# Patient Record
Sex: MALE | Race: WHITE | Employment: OTHER | URBAN - METROPOLITAN AREA
[De-identification: names, ages, dates, MRNs, and addresses within clinical notes are randomized per-mention and may not be internally consistent; named-entity substitution may affect disease eponyms.]

---

## 2018-10-30 ENCOUNTER — HOSPITAL ENCOUNTER (OUTPATIENT)
Dept: PHYSICAL THERAPY | Age: 78
Setting detail: THERAPIES SERIES
Discharge: HOME | End: 2018-10-30
Payer: MEDICARE

## 2018-10-30 PROCEDURE — 97161 PT EVAL LOW COMPLEX 20 MIN: CPT | Performed by: CHIROPRACTOR

## 2018-10-30 PROCEDURE — G8981 BODY POS CURRENT STATUS: HCPCS | Performed by: CHIROPRACTOR

## 2018-10-30 PROCEDURE — G8982 BODY POS GOAL STATUS: HCPCS | Performed by: CHIROPRACTOR

## 2018-10-30 PROCEDURE — 97530 THERAPEUTIC ACTIVITIES: CPT | Performed by: CHIROPRACTOR

## 2018-10-30 ASSESSMENT — PAIN SCALES - QUEBEC BACK PAIN DISABILITY SCALE
TOTAL SCORE: 83
QUEBEC CMS MODIFIER: CM
BEND OVER TO CLEAN THE BATHTUB: 5
SIT IN A CHAIR FOR SEVERAL HOURS: 4
MAKE YOUR BED: 4
PUT ON SOCKS OR PANYHOSE: 5
RIDE IN A CAR: 4
TURN OVER IN BED: 3
REACH UP TO HIGH SHELVES: 4
CARRY TWO BAGS OF GROCERIES: 5
MOVE A CHAIR: 3
WALK A FEW BLOCKS OR 300 TO 400M: 5
TAKE FOOD OUT OF THE REFRIGERATOR: 2
STAND UP FOR 20 TO 30 MINUTES: 4
LIFT AND CARRY A HEAVY SUITCASE: 5
RUN ONE BLOCK OR 100M: 5
WALK SEVERAL KILOMETERS  OR MILES: 5
THROW A BALL: 4
QUEBEC DISABILITY INDEX: 80-99%
CLIMB ONE FLIGHT OF STAIRS: 5
SLEEP THROUGH THE NIGHT: 4
GET OUT OF BED: 4
PULL OR PUSH HEAVY DOORS: 3

## 2018-11-01 ENCOUNTER — HOSPITAL ENCOUNTER (OUTPATIENT)
Dept: PHYSICAL THERAPY | Age: 78
Setting detail: THERAPIES SERIES
Discharge: HOME | End: 2018-11-01
Payer: MEDICARE

## 2018-11-01 PROCEDURE — 97113 AQUATIC THERAPY/EXERCISES: CPT

## 2018-11-06 ENCOUNTER — HOSPITAL ENCOUNTER (OUTPATIENT)
Dept: PHYSICAL THERAPY | Age: 78
Setting detail: THERAPIES SERIES
Discharge: HOME | End: 2018-11-06
Payer: MEDICARE

## 2018-11-06 PROCEDURE — 97150 GROUP THERAPEUTIC PROCEDURES: CPT

## 2018-11-06 PROCEDURE — 97113 AQUATIC THERAPY/EXERCISES: CPT

## 2018-11-08 ENCOUNTER — HOSPITAL ENCOUNTER (OUTPATIENT)
Dept: PHYSICAL THERAPY | Age: 78
Setting detail: THERAPIES SERIES
Discharge: HOME | End: 2018-11-08
Payer: MEDICARE

## 2018-11-08 PROCEDURE — 97150 GROUP THERAPEUTIC PROCEDURES: CPT

## 2018-11-08 PROCEDURE — 97113 AQUATIC THERAPY/EXERCISES: CPT

## 2018-11-08 NOTE — FLOWSHEET NOTE
Physical Therapy Aquatic Flow Sheet   Date:  2018    Patient Name:  Kulwant Watkins    :  1940     Restrictions/Precautions:    Pertinent Medical History:  Medical/Treatment Diagnosis Information:  · Diagnosis: LBP / Spinal Stenosis  ·   Insurance/Certification information:   Medicare  Physician Information:   Dr. Kenyatta Nielsen of care signed (Y/N): Faxed    Visit# / total visits:    Pain level:     3/10 LBP/ B LE    Progress Note: []  Yes  [x]  No  Next due by: Visit #10:      Subjective:  Soreness B shlds,L>R, and R lower leg    Objective:  Observation:  See eval  Test measurements:      Key  B= Belt DB= Dumbells T= Theratube   G=Gloves N= Noodles W= Weights   P= Paddles WW=Water Walker K= Kickboard        Transfers:   steps ind / B rails      % Immersion: 75%            Ambulation:  laps ind Exercises UE:  B   gentle    Forwards 5 Shoulder Shrugs     Lateral 1 HHA Shoulder circles     Marching    Scapular Retraction      Retro   Rolling  10    Cariocas  Push Downs 10     IR/ER 10     Punching    Stretching: B    30 sec  Rowing 10   Gastroc/Soleus  2 Elbow Flex/Ext 10   Hamstring  2 Shldr Flex/Ext     Knee flex stretch   Shldr Abd/Add    Piriformis   Horiz Abd/Add     Hip flexor    Arm Circles     SKTC   PNF Diagonals    DKTC 1 min UE oscillations f/b, s/s    ITB   Wall Push-ups    Quad  Figure 8's    Mid back   Buoy pull/push downs    UT  Tband rows    Rhom  Tband lats    Post Shoulder  Lumbar Rot w/ paddles    Pec   Small ball pull downs                   diagonals             Cervical:       AROM Flex       AROM Extension     LEs exercises:  B AROM Side Bending    Marching  10 AROM Rotation    Heel Raises  10 Chin tucks    Toe Raises  10 Chin nods     Squats  10      Hamstring Curls  10      Hip Flexion  10 Balance:      Hip Abduction 10 SLS    Hip Circles 10 Tandem stance    TA set 10 NBOS eyes open    Glut Set 10 NBOS eyes closed    Hip Extension  Hand to Opposite Knee    Hip Adduction    Box Step     Hip IR   Noodle Stance     Hip ER  Stop/Go Gait    Fig 8's  Switch Gait                Seated: B Functional:    Ankle Pumps 15 Step up forward    Ankle circles 10 Step up lateral    Knee flex & ext 15 Step down    Hip Abd & Adduction 15 [place] squats    Bicycle  15 Crate Lifts    Add Set with ball 10 Lunges forward    LX stab with med ball throws  Lunges lateral    Ankle INV  Lunges retro    Ankle EV  Lower ab curl with noodle      Upper ab curl with ball      Med ball straight lifts      Med ball diagonal lifts      Hydrorider          Noodle:      Leg Press  Deep Water:     hang at wall 1 min    relief Jog    Noodle hang deep water  Jumping Jacks    Noodle Bicycle  Heel to toe      Hand to opposite knee      Cross country skier      Rocking Horse        Timed Code Treatment Minutes:  45    Total Treatment Minutes:  45    Treatment/Activity Tolerance:  [x] Patient tolerated treatment well [] Patient limited by fatique  [] Patient limited by pain  [] Patient limited by other medical complications  [] Other:     Prognosis: [x] Good [] Fair  [] Poor    Patient Requires Follow-up: [x] Yes  [] No    Plan:   [x] Continue per plan of care [] Alter current plan (see comments)  [] Plan of care initiated [] Hold pending MD visit [] Discharge    Plan for Next Session:  Increase gt, seated ex    Electronically signed by: Shellie Segovia

## 2018-11-13 ENCOUNTER — HOSPITAL ENCOUNTER (OUTPATIENT)
Dept: PHYSICAL THERAPY | Age: 78
Setting detail: THERAPIES SERIES
Discharge: HOME | End: 2018-11-13
Payer: MEDICARE

## 2018-11-13 PROCEDURE — 97150 GROUP THERAPEUTIC PROCEDURES: CPT

## 2018-11-13 PROCEDURE — 97113 AQUATIC THERAPY/EXERCISES: CPT

## 2018-11-15 ENCOUNTER — HOSPITAL ENCOUNTER (OUTPATIENT)
Dept: PHYSICAL THERAPY | Age: 78
Setting detail: THERAPIES SERIES
Discharge: HOME | End: 2018-11-15
Payer: MEDICARE

## 2018-11-15 PROCEDURE — 97150 GROUP THERAPEUTIC PROCEDURES: CPT

## 2018-11-15 PROCEDURE — 97113 AQUATIC THERAPY/EXERCISES: CPT

## 2018-11-20 ENCOUNTER — HOSPITAL ENCOUNTER (OUTPATIENT)
Dept: PHYSICAL THERAPY | Age: 78
Setting detail: THERAPIES SERIES
Discharge: HOME | End: 2018-11-20
Payer: MEDICARE

## 2018-11-20 PROCEDURE — 97113 AQUATIC THERAPY/EXERCISES: CPT

## 2018-11-20 PROCEDURE — 97150 GROUP THERAPEUTIC PROCEDURES: CPT

## 2018-11-20 NOTE — FLOWSHEET NOTE
30 sec    Glut Set 10 NBOS eyes closed    Hip Extension  Hand to Opposite Knee    Hip Adduction    Box Step     Hip IR   Noodle Stance     Hip ER  Stop/Go Gait    Fig 8's  Switch Gait                Seated: B Functional: B   Ankle Pumps 25 Step up forward    Ankle circles 10 Step up lateral    Knee flex & ext 25 Step down    Hip Abd & Adduction 25 [place] squats 10   Bicycle  25 Crate Lifts    Add Set with ball 10 Lunges forward    LX stab with med ball throws  Lunges lateral    Ankle INV  Lunges retro    Ankle EV  Lower ab curl with noodle      Upper ab curl with ball      Med ball straight lifts      Med ball diagonal lifts      Hydrorider          Noodle:      Leg Press  Deep Water:     hang at wall 1 min    relief Jog    Noodle hang deep water  Jumping Jacks    Noodle Bicycle  Heel to toe      Hand to opposite knee      Cross country skier      Rocking Horse        Timed Code Treatment Minutes:  55    Total Treatment Minutes:  55    Treatment/Activity Tolerance:  [x] Patient tolerated treatment well [] Patient limited by fatique  [] Patient limited by pain  [] Patient limited by other medical complications  [] Other: Needs assist walking in shallow water and lateral gt due to poor balance.      Prognosis: [x] Good [] Fair  [] Poor    Patient Requires Follow-up: [x] Yes  [] No    Plan:   [x] Continue per plan of care [] Alter current plan (see comments)  [] Plan of care initiated [] Hold pending MD visit [] Discharge    Plan for Next Session:  Increase gt, seated ex    Electronically signed by: Monse Dupree,

## 2018-11-27 ENCOUNTER — HOSPITAL ENCOUNTER (OUTPATIENT)
Dept: PHYSICAL THERAPY | Age: 78
Setting detail: THERAPIES SERIES
Discharge: HOME | End: 2018-11-27
Payer: MEDICARE

## 2018-11-27 PROCEDURE — 97113 AQUATIC THERAPY/EXERCISES: CPT

## 2018-11-27 PROCEDURE — 97150 GROUP THERAPEUTIC PROCEDURES: CPT

## 2018-11-29 ENCOUNTER — HOSPITAL ENCOUNTER (OUTPATIENT)
Dept: PHYSICAL THERAPY | Age: 78
Setting detail: THERAPIES SERIES
Discharge: HOME | End: 2018-11-29
Payer: MEDICARE

## 2018-11-29 PROCEDURE — 97113 AQUATIC THERAPY/EXERCISES: CPT

## 2018-11-29 PROCEDURE — 97150 GROUP THERAPEUTIC PROCEDURES: CPT

## 2018-11-29 NOTE — FLOWSHEET NOTE
Physical Therapy Aquatic Flow Sheet   Date:  2018    Patient Name:  Sheree Fernandes    :  1940     Restrictions/Precautions:    Pertinent Medical History:  Medical/Treatment Diagnosis Information:  · Diagnosis: LBP / Spinal Stenosis  ·   Insurance/Certification information:   Medicare  Physician Information:   Dr. Spencer Rodriguez of care signed (Y/N): Faxed    Visit# / total visits:    Pain level:     3/10 LBP/ B LE    Progress Note: []  Yes  [x]  No  Next due by: Visit #10:      Subjective:  Feels balance a little better. Objective:  Observation:  See eval  Test measurements:      Key  B= Belt DB= Dumbells T= Theratube   G=Gloves N= Noodles W= Weights   P= Paddles WW=Water Walker K= Kickboard        Transfers:   steps ind / B rails      % Immersion: 75%            Ambulation:  laps ind Exercises UE:  B   gentle    Forwards 6 Shoulder Shrugs 10    Lateral 1 HHA for balance Shoulder circles unable    Marching    Scapular Retraction  10    Retro   Rolling  10    Cariocas  Push Downs 10     IR/ER 10     Punching    Stretching: B    30 sec  Rowing 10   Gastroc/Soleus  2 Elbow Flex/Ext 10   Hamstring  2 Shldr Flex/Ext     Knee flex stretch   Shldr Abd/Add    Piriformis   Horiz Abd/Add     Hip flexor    Arm Circles     SKTC   PNF Diagonals    DKTC 1 min & prn       relief UE oscillations f/b, s/s    ITB   Wall Push-ups    Quad  Figure 8's    Mid back   Buoy pull/push downs    UT  Tband rows    Rhom  Tband lats    Post Shoulder  Lumbar Rot w/ paddles    Pec   Small ball pull downs                   diagonals             Cervical:       AROM Flex       AROM Extension     LEs exercises:  B AROM Side Bending    Marching  10 AROM Rotation    Heel Raises  10 Chin tucks    Toe Raises  10 Chin nods     Squats  10      Hamstring Curls  10      Hip Flexion  10 Balance:      Hip Abduction 10 SLS    Hip Circles 10 Tandem stance    TA set 10 NBOS eyes open 30 sec    Glut Set 10 NBOS eyes closed    Hip Extension  Hand to Opposite Knee    Hip Adduction    Box Step  3 R/L    HHA    Hip IR   Noodle Stance     Hip ER  Stop/Go Gait    Fig 8's  Switch Gait      Step stance 30 sec R/L         Seated: B Functional: B   Ankle Pumps 30 Step up forward    Ankle circles 10 Step up lateral    Knee flex & ext 30 Step down    Hip Abd & Adduction 30 [place] squats 10   Bicycle  30 Crate Lifts    Add Set with ball 10 Lunges forward    LX stab with med ball throws  Lunges lateral    Ankle INV  Lunges retro    Ankle EV  Lower ab curl with noodle      Upper ab curl with ball      Med ball straight lifts      Med ball diagonal lifts      Hydrorider          Noodle:      Leg Press  Deep Water:     hang at wall 2 min    relief Jog    Noodle hang deep water  Jumping Jacks    Noodle Bicycle  Heel to toe      Hand to opposite knee      Cross country skier      Rocking Horse        Timed Code Treatment Minutes:  60    Total Treatment Minutes:  60    Treatment/Activity Tolerance:  [x] Patient tolerated treatment well [] Patient limited by fatique  [] Patient limited by pain  [] Patient limited by other medical complications  [x] Other: Needs assist walking in shallow water, lateral gt, and box step  due to poor balance.      Prognosis: [x] Good [] Fair  [] Poor    Patient Requires Follow-up: [x] Yes  [] No    Plan:   [x] Continue per plan of care [] Alter current plan (see comments)  [] Plan of care initiated [] Hold pending MD visit [] Discharge    Plan for Next Session:   staggered stance    Electronically signed by: Devin Coronel,

## 2018-12-04 ENCOUNTER — HOSPITAL ENCOUNTER (OUTPATIENT)
Dept: PHYSICAL THERAPY | Age: 78
Setting detail: THERAPIES SERIES
Discharge: HOME | End: 2018-12-04
Payer: MEDICARE

## 2018-12-04 PROCEDURE — G8981 BODY POS CURRENT STATUS: HCPCS

## 2018-12-04 PROCEDURE — 97113 AQUATIC THERAPY/EXERCISES: CPT

## 2018-12-04 PROCEDURE — G8982 BODY POS GOAL STATUS: HCPCS

## 2018-12-04 PROCEDURE — 97150 GROUP THERAPEUTIC PROCEDURES: CPT

## 2018-12-04 ASSESSMENT — PAIN SCALES - QUEBEC BACK PAIN DISABILITY SCALE
SLEEP THROUGH THE NIGHT: 5
REACH UP TO HIGH SHELVES: 5
BEND OVER TO CLEAN THE BATHTUB: 5
MAKE YOUR BED: 2
PUT ON SOCKS OR PANYHOSE: 5
LIFT AND CARRY A HEAVY SUITCASE: 5
SIT IN A CHAIR FOR SEVERAL HOURS: 4
CLIMB ONE FLIGHT OF STAIRS: 5
PULL OR PUSH HEAVY DOORS: 2
RUN ONE BLOCK OR 100M: 5
QUEBEC CMS MODIFIER: CL
WALK SEVERAL KILOMETERS  OR MILES: 5
MOVE A CHAIR: 2
CARRY TWO BAGS OF GROCERIES: 5
RIDE IN A CAR: 2
WALK A FEW BLOCKS OR 300 TO 400M: 5
STAND UP FOR 20 TO 30 MINUTES: 5
GET OUT OF BED: 2
TURN OVER IN BED: 1
QUEBEC DISABILITY INDEX: 60-79%
TOTAL SCORE: 77
TAKE FOOD OUT OF THE REFRIGERATOR: 2
THROW A BALL: 5

## 2018-12-06 ENCOUNTER — HOSPITAL ENCOUNTER (OUTPATIENT)
Dept: PHYSICAL THERAPY | Age: 78
Setting detail: THERAPIES SERIES
Discharge: HOME | End: 2018-12-06
Payer: MEDICARE

## 2018-12-06 PROCEDURE — 97113 AQUATIC THERAPY/EXERCISES: CPT

## 2018-12-06 PROCEDURE — 97150 GROUP THERAPEUTIC PROCEDURES: CPT

## 2018-12-06 NOTE — FLOWSHEET NOTE
Physical Therapy Aquatic Flow Sheet   Date:  2018    Patient Name:  Pascual Leyva    :  1940     Restrictions/Precautions:    Pertinent Medical History:  Medical/Treatment Diagnosis Information:  · Diagnosis: LBP / Spinal Stenosis  ·   Insurance/Certification information:   Medicare $  Physician Information:   Dr. Alisa oCrrea of care signed (Y/N): Faxed    Visit# / total visits:    Pain level:     3/10 LBP/ B LE    Progress Note: []  Yes  [x]  No  Next due by: Visit #10:      Subjective:  Aquatic ex helpful.        Objective:  Observation:  See eval  Test measurements:      Key  B= Belt DB= Dumbells T= Theratube   G=Gloves N= Noodles W= Weights   P= Paddles WW=Water Walker K= Kickboard        Transfers:   steps ind / B rails      % Immersion: 75%            Ambulation:  laps ind Exercises UE:  B   gentle    Forwards 6 Shoulder Shrugs 10    Lateral 1 HHA/ SBA  for balance Shoulder circles unable    Marching   In place w/o hands Scapular Retraction  10    Retro   Rolling  10    Cariocas  Push Downs 10     IR/ER 10     Punching    Stretching: B    30 sec  Rowing 10   Gastroc/Soleus  2 Elbow Flex/Ext 10   Hamstring  2 Shldr Flex/Ext  10   Knee flex stretch   Shldr Abd/Add 10   Piriformis   Horiz Abd/Add  10   Hip flexor    Arm Circles  10   SKTC   PNF Diagonals 10   DKTC 1 min & prn       relief UE oscillations f/b, s/s    ITB   Wall Push-ups    Quad  Figure 8's    Mid back   Buoy pull/push downs    UT  Tband rows    Rhom  Tband lats    Post Shoulder  Lumbar Rot w/ paddles    Pec   Small ball pull downs                   diagonals             Cervical:       AROM Flex       AROM Extension     LEs exercises:  B AROM Side Bending    Marching  10 AROM Rotation    Heel Raises  10 Chin tucks    Toe Raises  10 Chin nods     Squats  10      Hamstring Curls  10      Hip Flexion  10 Balance:  B   Hip Abduction 10 SLS    Hip Circles 10 Tandem stance    TA set 10 NBOS eyes open 30 sec    Glut Set

## 2018-12-11 ENCOUNTER — HOSPITAL ENCOUNTER (OUTPATIENT)
Dept: PHYSICAL THERAPY | Age: 78
Setting detail: THERAPIES SERIES
Discharge: HOME | End: 2018-12-11
Payer: MEDICARE

## 2018-12-11 PROCEDURE — 97113 AQUATIC THERAPY/EXERCISES: CPT

## 2018-12-11 PROCEDURE — 97150 GROUP THERAPEUTIC PROCEDURES: CPT

## 2018-12-11 NOTE — FLOWSHEET NOTE
Hip Circles 10 Tandem stance    TA set 10 NBOS eyes open 30 sec    Glut Set 10 NBOS eyes closed    Hip Extension  Hand to Opposite Knee    Hip Adduction    Box Step  3 R/L    HHA    Hip IR   Noodle Stance     Hip ER  Stop/Go Gait    Fig 8's  Switch Gait      Step stance 30 sec R/L         Seated: B Functional: B   Ankle Pumps 30 Step up forward 10   Ankle circles 10 Step up lateral    Knee flex & ext 30 Step down    Hip Abd & Adduction 30 [place] squats 10 with balance   Bicycle  30 Crate Lifts    Add Set with ball 10 Lunges forward    LX stab with med ball throws  Lunges lateral    Ankle INV  Lunges retro    Ankle EV  Lower ab curl with noodle      Upper ab curl with ball      Med ball straight lifts      Med ball diagonal lifts      Hydrorider          Noodle:      Leg Press  Deep Water:     hang at wall 2 min    relief Jog    Noodle hang deep water  Jumping Jacks    Noodle Bicycle  Heel to toe      Hand to opposite knee      Cross country skier      Rocking Horse        Timed Code Treatment Minutes:  60    Total Treatment Minutes:  60    Treatment/Activity Tolerance:  [x] Patient tolerated treatment well [] Patient limited by fatique  [] Patient limited by pain  [] Patient limited by other medical complications  [x] Other: Needs assist walking in shallow water, lateral gt, and box step  due to poor balance. Prognosis: [x] Good [] Fair  [] Poor    Patient Requires Follow-up: [x] Yes  [] No    Plan:   [x] Continue per plan of care [] Alter current plan (see comments)  [] Plan of care initiated [] Hold pending MD visit [] Discharge    Plan for Next Session:   staggered stance.   Re-eval per PT    Electronically signed by: Regina Oliva,

## 2018-12-13 ENCOUNTER — HOSPITAL ENCOUNTER (OUTPATIENT)
Dept: PHYSICAL THERAPY | Age: 78
Setting detail: THERAPIES SERIES
Discharge: HOME | End: 2018-12-13
Payer: MEDICARE

## 2018-12-13 PROCEDURE — 97150 GROUP THERAPEUTIC PROCEDURES: CPT

## 2018-12-13 PROCEDURE — 97530 THERAPEUTIC ACTIVITIES: CPT | Performed by: CHIROPRACTOR

## 2018-12-13 PROCEDURE — 97113 AQUATIC THERAPY/EXERCISES: CPT

## 2018-12-20 ENCOUNTER — HOSPITAL ENCOUNTER (OUTPATIENT)
Dept: PHYSICAL THERAPY | Age: 78
Setting detail: THERAPIES SERIES
Discharge: HOME | End: 2018-12-20
Payer: MEDICARE

## 2018-12-20 PROCEDURE — 97150 GROUP THERAPEUTIC PROCEDURES: CPT

## 2018-12-20 PROCEDURE — 97113 AQUATIC THERAPY/EXERCISES: CPT

## 2018-12-20 NOTE — FLOWSHEET NOTE
eyes closed    Hip Extension  Hand to Opposite Knee    Hip Adduction    Box Step  3 R/L    HHA  Balance worse t today       Hip IR   Noodle Stance     Hip ER  Stop/Go Gait    Fig 8's  Switch Gait      Step stance 30 sec R/L     staggered stance 30 sec  R/L   Seated: B Functional: B   Ankle Pumps 30 Step up forward 10   Ankle circles 10 Step up lateral    Knee flex & ext 30 Step down    Hip Abd & Adduction 30 [place] squats 10 with balance   Bicycle  30 Crate Lifts    Add Set with ball 10 Lunges forward    LX stab with med ball throws  Lunges lateral    Ankle INV  Lunges retro    Ankle EV  Lower ab curl with noodle      Upper ab curl with ball      Med ball straight lifts      Med ball diagonal lifts      Hydrorider          Noodle:      Leg Press  Deep Water:     hang at wall 2 min    relief Jog    Noodle hang deep water  Jumping Jacks    Noodle Bicycle  Heel to toe      Hand to opposite knee      Cross country skier      Rocking Horse        Timed Code Treatment Minutes:  60    Total Treatment Minutes:  60    Treatment/Activity Tolerance:  [x] Patient tolerated treatment well [] Patient limited by fatique  [] Patient limited by pain  [] Patient limited by other medical complications  [x] Other: Needs assist walking in shallow water, lateral gt, and box step  due to poor balance. Prognosis: [x] Good [] Fair  [] Poor    Patient Requires Follow-up: [x] Yes  [] No    Plan:   [x] Continue per plan of care [] Alter current plan (see comments)  [] Plan of care initiated [] Hold pending MD visit [] Discharge    Plan for Next Session:   Resume previous ex as tolerated.      Electronically signed by: Liv Springer,

## 2018-12-27 ENCOUNTER — HOSPITAL ENCOUNTER (OUTPATIENT)
Dept: PHYSICAL THERAPY | Age: 78
Setting detail: THERAPIES SERIES
Discharge: HOME | End: 2018-12-27
Payer: MEDICARE

## 2018-12-27 PROCEDURE — 97150 GROUP THERAPEUTIC PROCEDURES: CPT

## 2018-12-27 PROCEDURE — 97113 AQUATIC THERAPY/EXERCISES: CPT

## 2018-12-27 NOTE — FLOWSHEET NOTE
NBOS eyes closed 30 sec    Hip Extension  Hand to Opposite Knee    Hip Adduction    Box Step  3 R/L    HHA  Balance worse t today       Hip IR   Noodle Stance     Hip ER  Stop/Go Gait    Fig 8's  Switch Gait      Step stance 30 sec R/L     staggered stance 30 sec  R/L   Seated: B Functional: B   Ankle Pumps 30 Step up forward 10   Ankle circles 10 Step up lateral    Knee flex & ext 30 Step down    Hip Abd & Adduction 30 [place] squats 10 with balance   Bicycle  30 Crate Lifts    Add Set with ball 10 Lunges forward    LX stab with med ball throws  Lunges lateral    Ankle INV  Lunges retro    Ankle EV  Lower ab curl with noodle      Upper ab curl with ball      Med ball straight lifts      Med ball diagonal lifts      Hydrorider          Noodle:      Leg Press  Deep Water:     hang at wall 2 min    relief Jog    Noodle hang deep water  Jumping Jacks    Noodle Bicycle  Heel to toe      Hand to opposite knee      Cross country skier      Rocking Horse        Timed Code Treatment Minutes:  60 9 1 on 1 x 45 min)    Total Treatment Minutes:  60    Treatment/Activity Tolerance:  [x] Patient tolerated treatment well [] Patient limited by fatique  [] Patient limited by pain  [] Patient limited by other medical complications  [x] Other: Needs assist walking in shallow water,  and box step  due to poor balance.       Prognosis: [x] Good [] Fair  [] Poor    Patient Requires Follow-up: [x] Yes  [] No    Plan:   [x] Continue per plan of care [] Alter current plan (see comments)  [] Plan of care initiated [] Hold pending MD visit [] Discharge    Plan for Next Session:   Step stance with EC    Electronically signed by: Josie Dangelo,

## 2019-01-03 ENCOUNTER — HOSPITAL ENCOUNTER (OUTPATIENT)
Dept: PHYSICAL THERAPY | Age: 79
Setting detail: THERAPIES SERIES
Discharge: HOME | End: 2019-01-03
Payer: MEDICARE

## 2019-01-03 PROCEDURE — 97150 GROUP THERAPEUTIC PROCEDURES: CPT

## 2019-01-03 PROCEDURE — 97113 AQUATIC THERAPY/EXERCISES: CPT

## 2019-01-08 ENCOUNTER — HOSPITAL ENCOUNTER (OUTPATIENT)
Dept: PHYSICAL THERAPY | Age: 79
Setting detail: THERAPIES SERIES
Discharge: HOME | End: 2019-01-08
Payer: MEDICARE

## 2019-01-08 PROCEDURE — 97150 GROUP THERAPEUTIC PROCEDURES: CPT

## 2019-01-08 PROCEDURE — 97113 AQUATIC THERAPY/EXERCISES: CPT

## 2019-01-10 ENCOUNTER — HOSPITAL ENCOUNTER (OUTPATIENT)
Dept: PHYSICAL THERAPY | Age: 79
Setting detail: THERAPIES SERIES
Discharge: HOME | End: 2019-01-10
Payer: MEDICARE

## 2019-01-10 PROCEDURE — 97150 GROUP THERAPEUTIC PROCEDURES: CPT

## 2019-01-10 PROCEDURE — 97113 AQUATIC THERAPY/EXERCISES: CPT

## 2019-01-15 ENCOUNTER — HOSPITAL ENCOUNTER (OUTPATIENT)
Dept: PHYSICAL THERAPY | Age: 79
Setting detail: THERAPIES SERIES
Discharge: HOME | End: 2019-01-15
Payer: MEDICARE

## 2019-01-15 PROCEDURE — 97150 GROUP THERAPEUTIC PROCEDURES: CPT

## 2019-01-15 PROCEDURE — 97113 AQUATIC THERAPY/EXERCISES: CPT

## 2019-01-17 ENCOUNTER — HOSPITAL ENCOUNTER (OUTPATIENT)
Dept: PHYSICAL THERAPY | Age: 79
Setting detail: THERAPIES SERIES
Discharge: HOME | End: 2019-01-17
Payer: MEDICARE

## 2019-01-17 PROCEDURE — 97150 GROUP THERAPEUTIC PROCEDURES: CPT

## 2019-01-17 PROCEDURE — G8981 BODY POS CURRENT STATUS: HCPCS

## 2019-01-17 PROCEDURE — G8982 BODY POS GOAL STATUS: HCPCS

## 2019-01-17 PROCEDURE — 97113 AQUATIC THERAPY/EXERCISES: CPT

## 2019-01-17 ASSESSMENT — PAIN SCALES - QUEBEC BACK PAIN DISABILITY SCALE
MOVE A CHAIR: 0
CARRY TWO BAGS OF GROCERIES: 5
WALK A FEW BLOCKS OR 300 TO 400M: 5
THROW A BALL: 1
SLEEP THROUGH THE NIGHT: 5
TAKE FOOD OUT OF THE REFRIGERATOR: 0
TOTAL SCORE: 64
TURN OVER IN BED: 0
STAND UP FOR 20 TO 30 MINUTES: 5
SIT IN A CHAIR FOR SEVERAL HOURS: 0
QUEBEC DISABILITY INDEX: 60-79%
GET OUT OF BED: 1
LIFT AND CARRY A HEAVY SUITCASE: 5
REACH UP TO HIGH SHELVES: 5
BEND OVER TO CLEAN THE BATHTUB: 5
CLIMB ONE FLIGHT OF STAIRS: 5
PUT ON SOCKS OR PANYHOSE: 5
MAKE YOUR BED: 5
RUN ONE BLOCK OR 100M: 5
PULL OR PUSH HEAVY DOORS: 2
RIDE IN A CAR: 0
QUEBEC CMS MODIFIER: CL
WALK SEVERAL KILOMETERS  OR MILES: 5

## 2019-01-22 ENCOUNTER — HOSPITAL ENCOUNTER (OUTPATIENT)
Dept: PHYSICAL THERAPY | Age: 79
Setting detail: THERAPIES SERIES
Discharge: HOME | End: 2019-01-22
Payer: MEDICARE

## 2019-01-22 PROCEDURE — 97150 GROUP THERAPEUTIC PROCEDURES: CPT

## 2019-01-22 PROCEDURE — 97113 AQUATIC THERAPY/EXERCISES: CPT

## 2019-01-24 ENCOUNTER — HOSPITAL ENCOUNTER (OUTPATIENT)
Dept: PHYSICAL THERAPY | Age: 79
Setting detail: THERAPIES SERIES
Discharge: HOME | End: 2019-01-24
Payer: MEDICARE

## 2019-01-24 PROCEDURE — 97113 AQUATIC THERAPY/EXERCISES: CPT

## 2019-01-24 PROCEDURE — 97150 GROUP THERAPEUTIC PROCEDURES: CPT

## 2019-01-29 ENCOUNTER — HOSPITAL ENCOUNTER (OUTPATIENT)
Dept: PHYSICAL THERAPY | Age: 79
Setting detail: THERAPIES SERIES
Discharge: HOME | End: 2019-01-29
Payer: MEDICARE

## 2019-01-29 NOTE — FLOWSHEET NOTE
Physical Therapy  Cancellation/No-show Note  Patient Name:  Yulia Cobos  :  1940   Date:  2019  Cancelled visits to date: 1  No-shows to date: 0    For today's appointment patient:  [x]  Cancelled  []  Rescheduled appointment  []  No-show     Reason given by patient:  []  Patient ill  []  Conflicting appointment  []  No transportation    []  Conflict with work  []  No reason given  [x]  Other:     Comments:  Jaw sx    Electronically signed by:  Linh Espinoza,

## 2019-01-31 ENCOUNTER — APPOINTMENT (OUTPATIENT)
Dept: PHYSICAL THERAPY | Age: 79
End: 2019-01-31
Payer: MEDICARE

## 2019-02-05 ENCOUNTER — HOSPITAL ENCOUNTER (OUTPATIENT)
Dept: PHYSICAL THERAPY | Age: 79
Setting detail: THERAPIES SERIES
Discharge: HOME | End: 2019-02-05
Payer: MEDICARE

## 2019-02-05 PROCEDURE — 97113 AQUATIC THERAPY/EXERCISES: CPT

## 2019-02-05 PROCEDURE — 97150 GROUP THERAPEUTIC PROCEDURES: CPT

## 2019-02-07 ENCOUNTER — HOSPITAL ENCOUNTER (OUTPATIENT)
Dept: PHYSICAL THERAPY | Age: 79
Setting detail: THERAPIES SERIES
Discharge: HOME | End: 2019-02-07
Payer: MEDICARE

## 2019-02-07 PROCEDURE — 97113 AQUATIC THERAPY/EXERCISES: CPT

## 2019-02-07 PROCEDURE — 97150 GROUP THERAPEUTIC PROCEDURES: CPT

## 2019-02-12 ENCOUNTER — HOSPITAL ENCOUNTER (OUTPATIENT)
Dept: PHYSICAL THERAPY | Age: 79
Setting detail: THERAPIES SERIES
Discharge: HOME | End: 2019-02-12
Payer: MEDICARE

## 2019-02-12 PROCEDURE — 97150 GROUP THERAPEUTIC PROCEDURES: CPT

## 2019-02-12 PROCEDURE — 97113 AQUATIC THERAPY/EXERCISES: CPT

## 2019-02-12 PROCEDURE — 97530 THERAPEUTIC ACTIVITIES: CPT | Performed by: CHIROPRACTOR

## 2019-02-14 ASSESSMENT — PAIN SCALES - QUEBEC BACK PAIN DISABILITY SCALE
WALK SEVERAL KILOMETERS  OR MILES: 5
TURN OVER IN BED: 0
QUEBEC CMS MODIFIER: CL
TAKE FOOD OUT OF THE REFRIGERATOR: 0
PULL OR PUSH HEAVY DOORS: 2
PUT ON SOCKS OR PANYHOSE: 5
SIT IN A CHAIR FOR SEVERAL HOURS: 0
LIFT AND CARRY A HEAVY SUITCASE: 5
THROW A BALL: 1
STAND UP FOR 20 TO 30 MINUTES: 5
CLIMB ONE FLIGHT OF STAIRS: 5
BEND OVER TO CLEAN THE BATHTUB: 5
QUEBEC DISABILITY INDEX: 60-79%
RUN ONE BLOCK OR 100M: 5
SLEEP THROUGH THE NIGHT: 5
TOTAL SCORE: 64
RIDE IN A CAR: 0
REACH UP TO HIGH SHELVES: 5
GET OUT OF BED: 1
MOVE A CHAIR: 0
MAKE YOUR BED: 5
WALK A FEW BLOCKS OR 300 TO 400M: 5
CARRY TWO BAGS OF GROCERIES: 5

## 2023-04-30 ENCOUNTER — HOSPITAL ENCOUNTER (INPATIENT)
Age: 83
DRG: 949 | End: 2023-04-30
Attending: PHYSICAL MEDICINE & REHABILITATION | Admitting: PHYSICAL MEDICINE & REHABILITATION
Payer: MEDICARE

## 2023-04-30 ENCOUNTER — APPOINTMENT (OUTPATIENT)
Dept: GENERAL RADIOLOGY | Age: 83
DRG: 949 | End: 2023-04-30
Attending: PHYSICAL MEDICINE & REHABILITATION
Payer: MEDICARE

## 2023-04-30 DIAGNOSIS — T82.6XXA ENDOCARDITIS OF PROSTHETIC AORTIC VALVE (HCC): Primary | ICD-10-CM

## 2023-04-30 DIAGNOSIS — I35.8 ENDOCARDITIS OF PROSTHETIC AORTIC VALVE (HCC): Primary | ICD-10-CM

## 2023-04-30 LAB
GLUCOSE BLD-MCNC: 101 MG/DL (ref 70–99)
GLUCOSE BLD-MCNC: 171 MG/DL (ref 70–99)
PERFORMED ON: ABNORMAL
PERFORMED ON: ABNORMAL

## 2023-04-30 PROCEDURE — 6370000000 HC RX 637 (ALT 250 FOR IP): Performed by: PHYSICAL MEDICINE & REHABILITATION

## 2023-04-30 PROCEDURE — 1280000000 HC REHAB R&B

## 2023-04-30 PROCEDURE — 6360000002 HC RX W HCPCS: Performed by: PHYSICAL MEDICINE & REHABILITATION

## 2023-04-30 PROCEDURE — 71045 X-RAY EXAM CHEST 1 VIEW: CPT

## 2023-04-30 PROCEDURE — 2580000003 HC RX 258: Performed by: PHYSICAL MEDICINE & REHABILITATION

## 2023-04-30 RX ORDER — LANOLIN ALCOHOL/MO/W.PET/CERES
6 CREAM (GRAM) TOPICAL NIGHTLY
Status: DISCONTINUED | OUTPATIENT
Start: 2023-04-30 | End: 2023-05-17 | Stop reason: HOSPADM

## 2023-04-30 RX ORDER — DEXTROSE MONOHYDRATE 100 MG/ML
INJECTION, SOLUTION INTRAVENOUS CONTINUOUS PRN
Status: DISCONTINUED | OUTPATIENT
Start: 2023-04-30 | End: 2023-05-17 | Stop reason: HOSPADM

## 2023-04-30 RX ORDER — INSULIN LISPRO 100 [IU]/ML
0-4 INJECTION, SOLUTION INTRAVENOUS; SUBCUTANEOUS
Status: DISCONTINUED | OUTPATIENT
Start: 2023-04-30 | End: 2023-05-17 | Stop reason: HOSPADM

## 2023-04-30 RX ORDER — SODIUM CHLORIDE 9 MG/ML
25 INJECTION, SOLUTION INTRAVENOUS PRN
Status: DISCONTINUED | OUTPATIENT
Start: 2023-04-30 | End: 2023-05-17 | Stop reason: HOSPADM

## 2023-04-30 RX ORDER — OXYCODONE HYDROCHLORIDE 5 MG/1
5 TABLET ORAL EVERY 4 HOURS PRN
Status: DISCONTINUED | OUTPATIENT
Start: 2023-04-30 | End: 2023-05-17 | Stop reason: HOSPADM

## 2023-04-30 RX ORDER — VITAMIN B COMPLEX
1000 TABLET ORAL DAILY
Status: DISCONTINUED | OUTPATIENT
Start: 2023-05-01 | End: 2023-05-17 | Stop reason: HOSPADM

## 2023-04-30 RX ORDER — MULTIVITAMIN WITH IRON
1 TABLET ORAL DAILY
Status: DISCONTINUED | OUTPATIENT
Start: 2023-05-01 | End: 2023-05-17 | Stop reason: HOSPADM

## 2023-04-30 RX ORDER — SENNA AND DOCUSATE SODIUM 50; 8.6 MG/1; MG/1
1 TABLET, FILM COATED ORAL 2 TIMES DAILY
Status: DISCONTINUED | OUTPATIENT
Start: 2023-04-30 | End: 2023-05-17 | Stop reason: HOSPADM

## 2023-04-30 RX ORDER — OXYCODONE HYDROCHLORIDE 10 MG/1
10 TABLET ORAL EVERY 4 HOURS PRN
Status: DISCONTINUED | OUTPATIENT
Start: 2023-04-30 | End: 2023-05-17 | Stop reason: HOSPADM

## 2023-04-30 RX ORDER — HYDRALAZINE HYDROCHLORIDE 10 MG/1
10 TABLET, FILM COATED ORAL EVERY 6 HOURS PRN
Status: DISCONTINUED | OUTPATIENT
Start: 2023-04-30 | End: 2023-05-17 | Stop reason: HOSPADM

## 2023-04-30 RX ORDER — TORSEMIDE 20 MG/1
20 TABLET ORAL DAILY
Status: DISCONTINUED | OUTPATIENT
Start: 2023-05-01 | End: 2023-05-10

## 2023-04-30 RX ORDER — INSULIN GLARGINE 100 [IU]/ML
10 INJECTION, SOLUTION SUBCUTANEOUS 2 TIMES DAILY
Status: DISCONTINUED | OUTPATIENT
Start: 2023-04-30 | End: 2023-05-02

## 2023-04-30 RX ORDER — ATORVASTATIN CALCIUM 40 MG/1
40 TABLET, FILM COATED ORAL NIGHTLY
Status: DISCONTINUED | OUTPATIENT
Start: 2023-04-30 | End: 2023-05-17 | Stop reason: HOSPADM

## 2023-04-30 RX ORDER — ACETAMINOPHEN 325 MG/1
650 TABLET ORAL EVERY 6 HOURS PRN
Status: DISCONTINUED | OUTPATIENT
Start: 2023-04-30 | End: 2023-05-17 | Stop reason: HOSPADM

## 2023-04-30 RX ORDER — ENOXAPARIN SODIUM 100 MG/ML
40 INJECTION SUBCUTANEOUS DAILY
Status: DISCONTINUED | OUTPATIENT
Start: 2023-05-01 | End: 2023-05-17 | Stop reason: HOSPADM

## 2023-04-30 RX ORDER — FLUOXETINE HYDROCHLORIDE 20 MG/1
60 CAPSULE ORAL DAILY
Status: DISCONTINUED | OUTPATIENT
Start: 2023-05-01 | End: 2023-05-17 | Stop reason: HOSPADM

## 2023-04-30 RX ORDER — POLYETHYLENE GLYCOL 3350 17 G/17G
17 POWDER, FOR SOLUTION ORAL DAILY PRN
Status: DISCONTINUED | OUTPATIENT
Start: 2023-04-30 | End: 2023-05-17 | Stop reason: HOSPADM

## 2023-04-30 RX ORDER — SODIUM CHLORIDE 0.9 % (FLUSH) 0.9 %
5-40 SYRINGE (ML) INJECTION EVERY 12 HOURS SCHEDULED
Status: DISCONTINUED | OUTPATIENT
Start: 2023-04-30 | End: 2023-05-17 | Stop reason: HOSPADM

## 2023-04-30 RX ORDER — INSULIN LISPRO 100 [IU]/ML
0-4 INJECTION, SOLUTION INTRAVENOUS; SUBCUTANEOUS NIGHTLY
Status: DISCONTINUED | OUTPATIENT
Start: 2023-04-30 | End: 2023-05-17 | Stop reason: HOSPADM

## 2023-04-30 RX ORDER — BISACODYL 10 MG
10 SUPPOSITORY, RECTAL RECTAL DAILY PRN
Status: DISCONTINUED | OUTPATIENT
Start: 2023-04-30 | End: 2023-05-17 | Stop reason: HOSPADM

## 2023-04-30 RX ORDER — ALLOPURINOL 300 MG/1
600 TABLET ORAL DAILY
Status: DISCONTINUED | OUTPATIENT
Start: 2023-05-01 | End: 2023-05-17 | Stop reason: HOSPADM

## 2023-04-30 RX ORDER — QUETIAPINE FUMARATE 25 MG/1
50 TABLET, FILM COATED ORAL NIGHTLY
Status: DISCONTINUED | OUTPATIENT
Start: 2023-04-30 | End: 2023-05-17 | Stop reason: HOSPADM

## 2023-04-30 RX ORDER — ONDANSETRON 4 MG/1
4 TABLET, FILM COATED ORAL EVERY 8 HOURS PRN
Status: DISCONTINUED | OUTPATIENT
Start: 2023-04-30 | End: 2023-05-17 | Stop reason: HOSPADM

## 2023-04-30 RX ORDER — LANOLIN ALCOHOL/MO/W.PET/CERES
200 CREAM (GRAM) TOPICAL
Status: DISCONTINUED | OUTPATIENT
Start: 2023-05-01 | End: 2023-05-04

## 2023-04-30 RX ORDER — ACETAMINOPHEN 500 MG
1000 TABLET ORAL EVERY 8 HOURS SCHEDULED
Status: DISCONTINUED | OUTPATIENT
Start: 2023-04-30 | End: 2023-05-17 | Stop reason: HOSPADM

## 2023-04-30 RX ORDER — ASPIRIN 81 MG/1
324 TABLET, CHEWABLE ORAL DAILY
Status: DISCONTINUED | OUTPATIENT
Start: 2023-05-01 | End: 2023-05-17 | Stop reason: HOSPADM

## 2023-04-30 RX ORDER — LIDOCAINE 4 G/G
2 PATCH TOPICAL DAILY
Status: DISCONTINUED | OUTPATIENT
Start: 2023-04-30 | End: 2023-05-17 | Stop reason: HOSPADM

## 2023-04-30 RX ORDER — INSULIN LISPRO 100 [IU]/ML
8 INJECTION, SOLUTION INTRAVENOUS; SUBCUTANEOUS
Status: DISCONTINUED | OUTPATIENT
Start: 2023-04-30 | End: 2023-05-02

## 2023-04-30 RX ORDER — GUAIFENESIN 600 MG/1
600 TABLET, EXTENDED RELEASE ORAL 2 TIMES DAILY
Status: DISCONTINUED | OUTPATIENT
Start: 2023-04-30 | End: 2023-05-17 | Stop reason: HOSPADM

## 2023-04-30 RX ORDER — SODIUM CHLORIDE 0.9 % (FLUSH) 0.9 %
5-40 SYRINGE (ML) INJECTION PRN
Status: DISCONTINUED | OUTPATIENT
Start: 2023-04-30 | End: 2023-05-17 | Stop reason: HOSPADM

## 2023-04-30 RX ORDER — PANTOPRAZOLE SODIUM 40 MG/1
40 TABLET, DELAYED RELEASE ORAL
Status: DISCONTINUED | OUTPATIENT
Start: 2023-05-01 | End: 2023-05-17 | Stop reason: HOSPADM

## 2023-04-30 RX ADMIN — SODIUM CHLORIDE, PRESERVATIVE FREE 10 ML: 5 INJECTION INTRAVENOUS at 20:49

## 2023-04-30 RX ADMIN — OXYCODONE HYDROCHLORIDE 10 MG: 10 TABLET ORAL at 19:12

## 2023-04-30 RX ADMIN — Medication 10 ML: at 18:45

## 2023-04-30 RX ADMIN — ATORVASTATIN CALCIUM 40 MG: 40 TABLET, FILM COATED ORAL at 20:29

## 2023-04-30 RX ADMIN — INSULIN GLARGINE 10 UNITS: 100 INJECTION, SOLUTION SUBCUTANEOUS at 20:32

## 2023-04-30 RX ADMIN — ACETAMINOPHEN 1000 MG: 500 TABLET ORAL at 21:01

## 2023-04-30 RX ADMIN — AMPICILLIN SODIUM 2000 MG: 2 INJECTION, POWDER, FOR SOLUTION INTRAMUSCULAR; INTRAVENOUS at 20:53

## 2023-04-30 RX ADMIN — METOPROLOL TARTRATE 12.5 MG: 25 TABLET, FILM COATED ORAL at 20:59

## 2023-04-30 RX ADMIN — GUAIFENESIN 600 MG: 600 TABLET ORAL at 20:29

## 2023-04-30 RX ADMIN — AMPICILLIN SODIUM 2000 MG: 2 INJECTION, POWDER, FOR SOLUTION INTRAMUSCULAR; INTRAVENOUS at 18:13

## 2023-04-30 RX ADMIN — QUETIAPINE FUMARATE 50 MG: 25 TABLET ORAL at 20:30

## 2023-04-30 RX ADMIN — SENNOSIDES AND DOCUSATE SODIUM 1 TABLET: 50; 8.6 TABLET ORAL at 20:29

## 2023-04-30 RX ADMIN — Medication 6 MG: at 20:30

## 2023-04-30 RX ADMIN — INSULIN LISPRO 8 UNITS: 100 INJECTION, SOLUTION INTRAVENOUS; SUBCUTANEOUS at 16:59

## 2023-04-30 RX ADMIN — Medication 10 ML: at 17:58

## 2023-04-30 ASSESSMENT — PAIN DESCRIPTION - LOCATION
LOCATION: GENERALIZED
LOCATION: GENERALIZED

## 2023-04-30 ASSESSMENT — PAIN SCALES - GENERAL
PAINLEVEL_OUTOF10: 0
PAINLEVEL_OUTOF10: 2
PAINLEVEL_OUTOF10: 7
PAINLEVEL_OUTOF10: 2

## 2023-04-30 ASSESSMENT — PAIN DESCRIPTION - DESCRIPTORS
DESCRIPTORS: ACHING;DISCOMFORT;SORE
DESCRIPTORS: ACHING;DISCOMFORT

## 2023-05-01 LAB
ALBUMIN SERPL-MCNC: 3 G/DL (ref 3.4–5)
ALBUMIN/GLOB SERPL: 0.9 {RATIO} (ref 1.1–2.2)
ALP SERPL-CCNC: 81 U/L (ref 40–129)
ALT SERPL-CCNC: 9 U/L (ref 10–40)
ANION GAP SERPL CALCULATED.3IONS-SCNC: 10 MMOL/L (ref 3–16)
AST SERPL-CCNC: 17 U/L (ref 15–37)
BASOPHILS # BLD: 0.1 K/UL (ref 0–0.2)
BASOPHILS NFR BLD: 0.5 %
BILIRUB SERPL-MCNC: <0.2 MG/DL (ref 0–1)
BUN SERPL-MCNC: 29 MG/DL (ref 7–20)
CALCIUM SERPL-MCNC: 8.8 MG/DL (ref 8.3–10.6)
CHLORIDE SERPL-SCNC: 104 MMOL/L (ref 99–110)
CO2 SERPL-SCNC: 27 MMOL/L (ref 21–32)
CREAT SERPL-MCNC: 0.9 MG/DL (ref 0.8–1.3)
CRP SERPL-MCNC: 45.4 MG/L (ref 0–5.1)
DEPRECATED RDW RBC AUTO: 17.6 % (ref 12.4–15.4)
EOSINOPHIL # BLD: 0.3 K/UL (ref 0–0.6)
EOSINOPHIL NFR BLD: 3 %
ERYTHROCYTE [SEDIMENTATION RATE] IN BLOOD BY WESTERGREN METHOD: 67 MM/HR (ref 0–20)
GFR SERPLBLD CREATININE-BSD FMLA CKD-EPI: >60 ML/MIN/{1.73_M2}
GLUCOSE BLD-MCNC: 133 MG/DL (ref 70–99)
GLUCOSE BLD-MCNC: 156 MG/DL (ref 70–99)
GLUCOSE BLD-MCNC: 163 MG/DL (ref 70–99)
GLUCOSE BLD-MCNC: 182 MG/DL (ref 70–99)
GLUCOSE BLD-MCNC: 51 MG/DL (ref 70–99)
GLUCOSE BLD-MCNC: 55 MG/DL (ref 70–99)
GLUCOSE BLD-MCNC: 62 MG/DL (ref 70–99)
GLUCOSE BLD-MCNC: 67 MG/DL (ref 70–99)
GLUCOSE BLD-MCNC: 88 MG/DL (ref 70–99)
GLUCOSE SERPL-MCNC: 137 MG/DL (ref 70–99)
HCT VFR BLD AUTO: 25.5 % (ref 40.5–52.5)
HGB BLD-MCNC: 8.3 G/DL (ref 13.5–17.5)
LYMPHOCYTES # BLD: 1.9 K/UL (ref 1–5.1)
LYMPHOCYTES NFR BLD: 18.9 %
MCH RBC QN AUTO: 29.8 PG (ref 26–34)
MCHC RBC AUTO-ENTMCNC: 32.5 G/DL (ref 31–36)
MCV RBC AUTO: 91.5 FL (ref 80–100)
MONOCYTES # BLD: 0.9 K/UL (ref 0–1.3)
MONOCYTES NFR BLD: 8.9 %
NEUTROPHILS # BLD: 7 K/UL (ref 1.7–7.7)
NEUTROPHILS NFR BLD: 68.7 %
PERFORMED ON: ABNORMAL
PERFORMED ON: NORMAL
PLATELET # BLD AUTO: 297 K/UL (ref 135–450)
PMV BLD AUTO: 7.9 FL (ref 5–10.5)
POTASSIUM SERPL-SCNC: 3.7 MMOL/L (ref 3.5–5.1)
PREALB SERPL-MCNC: 14.7 MG/DL (ref 20–40)
PROT SERPL-MCNC: 6.2 G/DL (ref 6.4–8.2)
RBC # BLD AUTO: 2.79 M/UL (ref 4.2–5.9)
SODIUM SERPL-SCNC: 141 MMOL/L (ref 136–145)
WBC # BLD AUTO: 10.2 K/UL (ref 4–11)

## 2023-05-01 PROCEDURE — 80053 COMPREHEN METABOLIC PANEL: CPT

## 2023-05-01 PROCEDURE — 97530 THERAPEUTIC ACTIVITIES: CPT

## 2023-05-01 PROCEDURE — 94760 N-INVAS EAR/PLS OXIMETRY 1: CPT

## 2023-05-01 PROCEDURE — 6360000002 HC RX W HCPCS: Performed by: PHYSICAL MEDICINE & REHABILITATION

## 2023-05-01 PROCEDURE — 97116 GAIT TRAINING THERAPY: CPT

## 2023-05-01 PROCEDURE — 36592 COLLECT BLOOD FROM PICC: CPT

## 2023-05-01 PROCEDURE — 97535 SELF CARE MNGMENT TRAINING: CPT

## 2023-05-01 PROCEDURE — 92523 SPEECH SOUND LANG COMPREHEN: CPT

## 2023-05-01 PROCEDURE — 85652 RBC SED RATE AUTOMATED: CPT

## 2023-05-01 PROCEDURE — 6370000000 HC RX 637 (ALT 250 FOR IP): Performed by: PHYSICAL MEDICINE & REHABILITATION

## 2023-05-01 PROCEDURE — 85025 COMPLETE CBC W/AUTO DIFF WBC: CPT

## 2023-05-01 PROCEDURE — 2580000003 HC RX 258: Performed by: PHYSICAL MEDICINE & REHABILITATION

## 2023-05-01 PROCEDURE — 97167 OT EVAL HIGH COMPLEX 60 MIN: CPT

## 2023-05-01 PROCEDURE — 97163 PT EVAL HIGH COMPLEX 45 MIN: CPT

## 2023-05-01 PROCEDURE — 84134 ASSAY OF PREALBUMIN: CPT

## 2023-05-01 PROCEDURE — 97110 THERAPEUTIC EXERCISES: CPT

## 2023-05-01 PROCEDURE — 86140 C-REACTIVE PROTEIN: CPT

## 2023-05-01 PROCEDURE — 1280000000 HC REHAB R&B

## 2023-05-01 RX ORDER — VITAMIN B COMPLEX
1 CAPSULE ORAL DAILY
COMMUNITY

## 2023-05-01 RX ORDER — ALLOPURINOL 300 MG/1
600 TABLET ORAL DAILY
COMMUNITY

## 2023-05-01 RX ORDER — OXYCODONE HYDROCHLORIDE AND ACETAMINOPHEN 5; 325 MG/1; MG/1
1 TABLET ORAL EVERY 4 HOURS PRN
COMMUNITY

## 2023-05-01 RX ORDER — MAGNESIUM 200 MG
200 TABLET ORAL DAILY
Status: ON HOLD | COMMUNITY
End: 2023-05-16 | Stop reason: HOSPADM

## 2023-05-01 RX ORDER — TORSEMIDE 20 MG/1
20 TABLET ORAL DAILY
Status: ON HOLD | COMMUNITY
End: 2023-05-16 | Stop reason: SDUPTHER

## 2023-05-01 RX ORDER — ASPIRIN 325 MG
325 TABLET ORAL DAILY
COMMUNITY

## 2023-05-01 RX ORDER — INSULIN ASPART 100 [IU]/ML
INJECTION, SOLUTION INTRAVENOUS; SUBCUTANEOUS
COMMUNITY

## 2023-05-01 RX ORDER — LIDOCAINE 50 MG/G
2 PATCH TOPICAL DAILY PRN
COMMUNITY

## 2023-05-01 RX ORDER — ASCORBIC ACID 500 MG
500 TABLET ORAL DAILY
COMMUNITY

## 2023-05-01 RX ORDER — POLYETHYLENE GLYCOL 3350 17 G/17G
17 POWDER, FOR SOLUTION ORAL DAILY PRN
COMMUNITY

## 2023-05-01 RX ORDER — LANOLIN ALCOHOL/MO/W.PET/CERES
6 CREAM (GRAM) TOPICAL NIGHTLY
COMMUNITY

## 2023-05-01 RX ORDER — QUETIAPINE FUMARATE 50 MG/1
50 TABLET, FILM COATED ORAL NIGHTLY
COMMUNITY

## 2023-05-01 RX ORDER — ACETAMINOPHEN 500 MG
1000 TABLET ORAL EVERY 8 HOURS
Status: ON HOLD | COMMUNITY
End: 2023-05-16 | Stop reason: HOSPADM

## 2023-05-01 RX ORDER — INSULIN ASPART 100 [IU]/ML
8 INJECTION, SOLUTION INTRAVENOUS; SUBCUTANEOUS
Status: ON HOLD | COMMUNITY
End: 2023-05-16 | Stop reason: HOSPADM

## 2023-05-01 RX ORDER — PANTOPRAZOLE SODIUM 40 MG/1
40 TABLET, DELAYED RELEASE ORAL DAILY
COMMUNITY

## 2023-05-01 RX ORDER — GUAIFENESIN 600 MG/1
1200 TABLET, EXTENDED RELEASE ORAL 2 TIMES DAILY PRN
COMMUNITY

## 2023-05-01 RX ORDER — FLUOXETINE HYDROCHLORIDE 20 MG/1
60 CAPSULE ORAL DAILY
COMMUNITY

## 2023-05-01 RX ORDER — TROLAMINE SALICYLATE 10 G/100G
CREAM TOPICAL 4 TIMES DAILY PRN
COMMUNITY

## 2023-05-01 RX ADMIN — QUETIAPINE FUMARATE 50 MG: 25 TABLET ORAL at 19:38

## 2023-05-01 RX ADMIN — OXYCODONE HYDROCHLORIDE 10 MG: 10 TABLET ORAL at 12:41

## 2023-05-01 RX ADMIN — ACETAMINOPHEN 1000 MG: 500 TABLET ORAL at 05:08

## 2023-05-01 RX ADMIN — INSULIN LISPRO 8 UNITS: 100 INJECTION, SOLUTION INTRAVENOUS; SUBCUTANEOUS at 12:27

## 2023-05-01 RX ADMIN — ALTEPLASE 1 MG: 2.2 INJECTION, POWDER, LYOPHILIZED, FOR SOLUTION INTRAVENOUS at 16:54

## 2023-05-01 RX ADMIN — OXYCODONE HYDROCHLORIDE 10 MG: 10 TABLET ORAL at 17:18

## 2023-05-01 RX ADMIN — METOPROLOL TARTRATE 12.5 MG: 25 TABLET, FILM COATED ORAL at 19:39

## 2023-05-01 RX ADMIN — METOPROLOL TARTRATE 12.5 MG: 25 TABLET, FILM COATED ORAL at 07:45

## 2023-05-01 RX ADMIN — Medication 16 G: at 20:50

## 2023-05-01 RX ADMIN — FLUOXETINE 60 MG: 20 CAPSULE ORAL at 07:38

## 2023-05-01 RX ADMIN — ATORVASTATIN CALCIUM 40 MG: 40 TABLET, FILM COATED ORAL at 20:51

## 2023-05-01 RX ADMIN — INSULIN LISPRO 8 UNITS: 100 INJECTION, SOLUTION INTRAVENOUS; SUBCUTANEOUS at 16:47

## 2023-05-01 RX ADMIN — AMPICILLIN SODIUM 2000 MG: 2 INJECTION, POWDER, FOR SOLUTION INTRAMUSCULAR; INTRAVENOUS at 08:02

## 2023-05-01 RX ADMIN — GUAIFENESIN 600 MG: 600 TABLET ORAL at 07:38

## 2023-05-01 RX ADMIN — SODIUM CHLORIDE, PRESERVATIVE FREE 10 ML: 5 INJECTION INTRAVENOUS at 07:54

## 2023-05-01 RX ADMIN — SENNOSIDES AND DOCUSATE SODIUM 1 TABLET: 50; 8.6 TABLET ORAL at 19:38

## 2023-05-01 RX ADMIN — AMPICILLIN SODIUM 2000 MG: 2 INJECTION, POWDER, FOR SOLUTION INTRAMUSCULAR; INTRAVENOUS at 04:47

## 2023-05-01 RX ADMIN — AMPICILLIN SODIUM 2000 MG: 2 INJECTION, POWDER, FOR SOLUTION INTRAMUSCULAR; INTRAVENOUS at 16:53

## 2023-05-01 RX ADMIN — Medication 200 MG: at 07:44

## 2023-05-01 RX ADMIN — SENNOSIDES AND DOCUSATE SODIUM 1 TABLET: 50; 8.6 TABLET ORAL at 07:46

## 2023-05-01 RX ADMIN — PANTOPRAZOLE SODIUM 40 MG: 40 TABLET, DELAYED RELEASE ORAL at 05:08

## 2023-05-01 RX ADMIN — INSULIN LISPRO 8 UNITS: 100 INJECTION, SOLUTION INTRAVENOUS; SUBCUTANEOUS at 07:48

## 2023-05-01 RX ADMIN — THERA TABS 1 TABLET: TAB at 07:44

## 2023-05-01 RX ADMIN — SODIUM CHLORIDE 25 ML: 9 INJECTION, SOLUTION INTRAVENOUS at 08:00

## 2023-05-01 RX ADMIN — Medication 6 MG: at 19:38

## 2023-05-01 RX ADMIN — ASPIRIN 81 MG 324 MG: 81 TABLET ORAL at 07:42

## 2023-05-01 RX ADMIN — ACETAMINOPHEN 1000 MG: 500 TABLET ORAL at 12:30

## 2023-05-01 RX ADMIN — CEFTRIAXONE SODIUM 2000 MG: 2 INJECTION, POWDER, FOR SOLUTION INTRAMUSCULAR; INTRAVENOUS at 00:11

## 2023-05-01 RX ADMIN — Medication 1000 UNITS: at 07:38

## 2023-05-01 RX ADMIN — AMPICILLIN SODIUM 2000 MG: 2 INJECTION, POWDER, FOR SOLUTION INTRAMUSCULAR; INTRAVENOUS at 01:26

## 2023-05-01 RX ADMIN — AMPICILLIN SODIUM 2000 MG: 2 INJECTION, POWDER, FOR SOLUTION INTRAMUSCULAR; INTRAVENOUS at 12:47

## 2023-05-01 RX ADMIN — INSULIN GLARGINE 10 UNITS: 100 INJECTION, SOLUTION SUBCUTANEOUS at 07:48

## 2023-05-01 RX ADMIN — SODIUM CHLORIDE 25 ML: 9 INJECTION, SOLUTION INTRAVENOUS at 12:45

## 2023-05-01 RX ADMIN — ACETAMINOPHEN 650 MG: 325 TABLET ORAL at 19:19

## 2023-05-01 RX ADMIN — ACETAMINOPHEN 1000 MG: 500 TABLET ORAL at 21:40

## 2023-05-01 RX ADMIN — ALLOPURINOL 600 MG: 300 TABLET ORAL at 07:38

## 2023-05-01 RX ADMIN — OXYCODONE HYDROCHLORIDE 10 MG: 10 TABLET ORAL at 05:12

## 2023-05-01 RX ADMIN — SODIUM CHLORIDE, PRESERVATIVE FREE 10 ML: 5 INJECTION INTRAVENOUS at 19:13

## 2023-05-01 RX ADMIN — GUAIFENESIN 600 MG: 600 TABLET ORAL at 19:38

## 2023-05-01 RX ADMIN — ENOXAPARIN SODIUM 40 MG: 100 INJECTION SUBCUTANEOUS at 07:51

## 2023-05-01 RX ADMIN — AMPICILLIN SODIUM 2000 MG: 2 INJECTION, POWDER, FOR SOLUTION INTRAMUSCULAR; INTRAVENOUS at 19:38

## 2023-05-01 RX ADMIN — CEFTRIAXONE SODIUM 2000 MG: 2 INJECTION, POWDER, FOR SOLUTION INTRAMUSCULAR; INTRAVENOUS at 12:48

## 2023-05-01 RX ADMIN — TORSEMIDE 20 MG: 20 TABLET ORAL at 07:45

## 2023-05-01 ASSESSMENT — PAIN DESCRIPTION - LOCATION
LOCATION: GENERALIZED
LOCATION: CHEST
LOCATION: CHEST
LOCATION: GENERALIZED

## 2023-05-01 ASSESSMENT — PAIN - FUNCTIONAL ASSESSMENT
PAIN_FUNCTIONAL_ASSESSMENT: ACTIVITIES ARE NOT PREVENTED
PAIN_FUNCTIONAL_ASSESSMENT: PREVENTS OR INTERFERES SOME ACTIVE ACTIVITIES AND ADLS
PAIN_FUNCTIONAL_ASSESSMENT: PREVENTS OR INTERFERES WITH MANY ACTIVE NOT PASSIVE ACTIVITIES
PAIN_FUNCTIONAL_ASSESSMENT: ACTIVITIES ARE NOT PREVENTED
PAIN_FUNCTIONAL_ASSESSMENT: ACTIVITIES ARE NOT PREVENTED

## 2023-05-01 ASSESSMENT — PAIN DESCRIPTION - PAIN TYPE
TYPE: ACUTE PAIN

## 2023-05-01 ASSESSMENT — PAIN SCALES - GENERAL
PAINLEVEL_OUTOF10: 7
PAINLEVEL_OUTOF10: 7
PAINLEVEL_OUTOF10: 0
PAINLEVEL_OUTOF10: 5
PAINLEVEL_OUTOF10: 3
PAINLEVEL_OUTOF10: 0
PAINLEVEL_OUTOF10: 7
PAINLEVEL_OUTOF10: 5

## 2023-05-01 ASSESSMENT — PAIN DESCRIPTION - DESCRIPTORS
DESCRIPTORS: ACHING;DISCOMFORT
DESCRIPTORS: ACHING
DESCRIPTORS: ACHING;DISCOMFORT
DESCRIPTORS: ACHING;DISCOMFORT
DESCRIPTORS: ACHING
DESCRIPTORS: ACHING;DISCOMFORT

## 2023-05-01 ASSESSMENT — PAIN DESCRIPTION - ORIENTATION
ORIENTATION: MID;UPPER
ORIENTATION: MID;UPPER

## 2023-05-01 ASSESSMENT — PAIN DESCRIPTION - ONSET
ONSET: ON-GOING

## 2023-05-01 ASSESSMENT — PAIN SCALES - WONG BAKER
WONGBAKER_NUMERICALRESPONSE: 0
WONGBAKER_NUMERICALRESPONSE: 0

## 2023-05-01 ASSESSMENT — PAIN DESCRIPTION - FREQUENCY
FREQUENCY: CONTINUOUS

## 2023-05-01 NOTE — H&P
Department of Physical Medicine & Rehabilitation  History & Physical      Patient Identification:  Chago Vincent  : 1940  Admit date: 2023   Attending provider: Krsy Lo MD        Primary care provider: Riley Jennings MD     Chief Complaint: weakness, chest wall pain    History of Present Illness/Hospital Course:  Patient is an 82 yo M with pmh CHF, DM2, and severe AS s/p TAVR with Justin S3 valve (8486) complicated by endocarditis of tricuspid valve and enterococcus bacteremia (2023). He was treated with IV antibiotics x 6 weeks (thru 3/1/2023) but subsequently had recurrent enterococcus bacteremia. Additional work-up as done including TTE, ARMIN, and tagged WBC scan. 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 Therefore decision was made to admit to [facility] on 2023 and undergo aortic valve replacement and aortic root debridement ( with Dr. Rolando Hope). He will continue on ampicillin and ceftriaxone x 6 weeks (thru ). Post-operative course was complicated by complete heart block requiring pacemaker placement ( with Dr. Johann Nunn). Also complicated by encephalopathy, dysphagia, and fluid overload. Now presents to ARU with impaired mobility, self-care, and cognition below his baseline. Currently, patient reports moderate chest wall pain. Worse with movement and cough. Imporves with rest and medication. He has mild shortness of breath with activity and intermittent cough. He denies lightheadedness/dizziness, chest pressure, palpitations. He feels generally weak.  He is motivated to start inpatient rehabilitation program.     Prior Level of Function:  Independent for mobility with cane/walker, ADLs    Current Level of Function:  Up to modA x 2 person for mobility  Up to total assist for ADLs    Pertinent Social History:  Support: lives with spouse  Home set-up: 1 level with

## 2023-05-02 LAB
GLUCOSE BLD-MCNC: 132 MG/DL (ref 70–99)
GLUCOSE BLD-MCNC: 142 MG/DL (ref 70–99)
GLUCOSE BLD-MCNC: 170 MG/DL (ref 70–99)
GLUCOSE BLD-MCNC: 212 MG/DL (ref 70–99)
GLUCOSE BLD-MCNC: 89 MG/DL (ref 70–99)
PERFORMED ON: ABNORMAL
PERFORMED ON: NORMAL

## 2023-05-02 PROCEDURE — 97530 THERAPEUTIC ACTIVITIES: CPT

## 2023-05-02 PROCEDURE — 6360000002 HC RX W HCPCS: Performed by: PHYSICAL MEDICINE & REHABILITATION

## 2023-05-02 PROCEDURE — 1280000000 HC REHAB R&B

## 2023-05-02 PROCEDURE — 94760 N-INVAS EAR/PLS OXIMETRY 1: CPT

## 2023-05-02 PROCEDURE — 92610 EVALUATE SWALLOWING FUNCTION: CPT

## 2023-05-02 PROCEDURE — 97110 THERAPEUTIC EXERCISES: CPT

## 2023-05-02 PROCEDURE — 6370000000 HC RX 637 (ALT 250 FOR IP): Performed by: PHYSICAL MEDICINE & REHABILITATION

## 2023-05-02 PROCEDURE — 97116 GAIT TRAINING THERAPY: CPT

## 2023-05-02 PROCEDURE — 2580000003 HC RX 258: Performed by: PHYSICAL MEDICINE & REHABILITATION

## 2023-05-02 PROCEDURE — 97535 SELF CARE MNGMENT TRAINING: CPT

## 2023-05-02 RX ORDER — INSULIN GLARGINE 100 [IU]/ML
14 INJECTION, SOLUTION SUBCUTANEOUS NIGHTLY
Status: DISCONTINUED | OUTPATIENT
Start: 2023-05-02 | End: 2023-05-17 | Stop reason: HOSPADM

## 2023-05-02 RX ORDER — INSULIN LISPRO 100 [IU]/ML
5 INJECTION, SOLUTION INTRAVENOUS; SUBCUTANEOUS
Status: DISCONTINUED | OUTPATIENT
Start: 2023-05-02 | End: 2023-05-09

## 2023-05-02 RX ADMIN — FLUOXETINE 60 MG: 20 CAPSULE ORAL at 08:49

## 2023-05-02 RX ADMIN — AMPICILLIN SODIUM 2000 MG: 2 INJECTION, POWDER, FOR SOLUTION INTRAMUSCULAR; INTRAVENOUS at 20:18

## 2023-05-02 RX ADMIN — PANTOPRAZOLE SODIUM 40 MG: 40 TABLET, DELAYED RELEASE ORAL at 05:11

## 2023-05-02 RX ADMIN — Medication 6 MG: at 20:22

## 2023-05-02 RX ADMIN — METOPROLOL TARTRATE 12.5 MG: 25 TABLET, FILM COATED ORAL at 20:21

## 2023-05-02 RX ADMIN — AMPICILLIN SODIUM 2000 MG: 2 INJECTION, POWDER, FOR SOLUTION INTRAMUSCULAR; INTRAVENOUS at 01:28

## 2023-05-02 RX ADMIN — ATORVASTATIN CALCIUM 40 MG: 40 TABLET, FILM COATED ORAL at 20:22

## 2023-05-02 RX ADMIN — GUAIFENESIN 600 MG: 600 TABLET ORAL at 20:23

## 2023-05-02 RX ADMIN — ENOXAPARIN SODIUM 40 MG: 100 INJECTION SUBCUTANEOUS at 08:52

## 2023-05-02 RX ADMIN — Medication 1000 UNITS: at 08:49

## 2023-05-02 RX ADMIN — CEFTRIAXONE SODIUM 2000 MG: 2 INJECTION, POWDER, FOR SOLUTION INTRAMUSCULAR; INTRAVENOUS at 00:34

## 2023-05-02 RX ADMIN — ACETAMINOPHEN 1000 MG: 500 TABLET ORAL at 14:03

## 2023-05-02 RX ADMIN — ASPIRIN 81 MG 324 MG: 81 TABLET ORAL at 08:49

## 2023-05-02 RX ADMIN — Medication 200 MG: at 08:51

## 2023-05-02 RX ADMIN — SENNOSIDES AND DOCUSATE SODIUM 1 TABLET: 50; 8.6 TABLET ORAL at 08:47

## 2023-05-02 RX ADMIN — THERA TABS 1 TABLET: TAB at 08:51

## 2023-05-02 RX ADMIN — SODIUM CHLORIDE 250 ML: 9 INJECTION, SOLUTION INTRAVENOUS at 23:54

## 2023-05-02 RX ADMIN — SODIUM CHLORIDE, PRESERVATIVE FREE 10 ML: 5 INJECTION INTRAVENOUS at 20:19

## 2023-05-02 RX ADMIN — INSULIN GLARGINE 14 UNITS: 100 INJECTION, SOLUTION SUBCUTANEOUS at 20:26

## 2023-05-02 RX ADMIN — QUETIAPINE FUMARATE 50 MG: 25 TABLET ORAL at 20:21

## 2023-05-02 RX ADMIN — ACETAMINOPHEN 1000 MG: 500 TABLET ORAL at 05:11

## 2023-05-02 RX ADMIN — INSULIN LISPRO 5 UNITS: 100 INJECTION, SOLUTION INTRAVENOUS; SUBCUTANEOUS at 12:18

## 2023-05-02 RX ADMIN — CEFTRIAXONE SODIUM 2000 MG: 2 INJECTION, POWDER, FOR SOLUTION INTRAMUSCULAR; INTRAVENOUS at 12:07

## 2023-05-02 RX ADMIN — AMPICILLIN SODIUM 2000 MG: 2 INJECTION, POWDER, FOR SOLUTION INTRAMUSCULAR; INTRAVENOUS at 04:27

## 2023-05-02 RX ADMIN — AMPICILLIN SODIUM 2000 MG: 2 INJECTION, POWDER, FOR SOLUTION INTRAMUSCULAR; INTRAVENOUS at 12:07

## 2023-05-02 RX ADMIN — SODIUM CHLORIDE, PRESERVATIVE FREE 10 ML: 5 INJECTION INTRAVENOUS at 08:00

## 2023-05-02 RX ADMIN — GUAIFENESIN 600 MG: 600 TABLET ORAL at 08:51

## 2023-05-02 RX ADMIN — SENNOSIDES AND DOCUSATE SODIUM 1 TABLET: 50; 8.6 TABLET ORAL at 20:23

## 2023-05-02 RX ADMIN — AMPICILLIN SODIUM 2000 MG: 2 INJECTION, POWDER, FOR SOLUTION INTRAMUSCULAR; INTRAVENOUS at 16:14

## 2023-05-02 RX ADMIN — ACETAMINOPHEN 1000 MG: 500 TABLET ORAL at 22:19

## 2023-05-02 RX ADMIN — METOPROLOL TARTRATE 12.5 MG: 25 TABLET, FILM COATED ORAL at 08:47

## 2023-05-02 RX ADMIN — TORSEMIDE 20 MG: 20 TABLET ORAL at 08:50

## 2023-05-02 RX ADMIN — SODIUM CHLORIDE 25 ML: 9 INJECTION, SOLUTION INTRAVENOUS at 20:17

## 2023-05-02 RX ADMIN — OXYCODONE HYDROCHLORIDE 10 MG: 10 TABLET ORAL at 20:22

## 2023-05-02 RX ADMIN — ALLOPURINOL 600 MG: 300 TABLET ORAL at 08:50

## 2023-05-02 RX ADMIN — AMPICILLIN SODIUM 2000 MG: 2 INJECTION, POWDER, FOR SOLUTION INTRAMUSCULAR; INTRAVENOUS at 07:17

## 2023-05-02 RX ADMIN — OXYCODONE HYDROCHLORIDE 10 MG: 10 TABLET ORAL at 07:30

## 2023-05-02 RX ADMIN — INSULIN LISPRO 8 UNITS: 100 INJECTION, SOLUTION INTRAVENOUS; SUBCUTANEOUS at 09:09

## 2023-05-02 ASSESSMENT — PAIN SCALES - GENERAL
PAINLEVEL_OUTOF10: 3
PAINLEVEL_OUTOF10: 8
PAINLEVEL_OUTOF10: 8
PAINLEVEL_OUTOF10: 0
PAINLEVEL_OUTOF10: 2
PAINLEVEL_OUTOF10: 7
PAINLEVEL_OUTOF10: 6
PAINLEVEL_OUTOF10: 4

## 2023-05-02 ASSESSMENT — PAIN DESCRIPTION - LOCATION
LOCATION: GENERALIZED
LOCATION: CHEST
LOCATION: GENERALIZED

## 2023-05-02 ASSESSMENT — PAIN - FUNCTIONAL ASSESSMENT
PAIN_FUNCTIONAL_ASSESSMENT: ACTIVITIES ARE NOT PREVENTED
PAIN_FUNCTIONAL_ASSESSMENT: PREVENTS OR INTERFERES SOME ACTIVE ACTIVITIES AND ADLS
PAIN_FUNCTIONAL_ASSESSMENT: ACTIVITIES ARE NOT PREVENTED
PAIN_FUNCTIONAL_ASSESSMENT: ACTIVITIES ARE NOT PREVENTED

## 2023-05-02 ASSESSMENT — PAIN DESCRIPTION - ONSET
ONSET: ON-GOING

## 2023-05-02 ASSESSMENT — PAIN DESCRIPTION - PAIN TYPE
TYPE: ACUTE PAIN

## 2023-05-02 ASSESSMENT — PAIN DESCRIPTION - DESCRIPTORS
DESCRIPTORS: ACHING;DISCOMFORT
DESCRIPTORS: ACHING
DESCRIPTORS: ACHING;DULL
DESCRIPTORS: ACHING;DULL
DESCRIPTORS: ACHING;DISCOMFORT

## 2023-05-02 ASSESSMENT — PAIN DESCRIPTION - ORIENTATION
ORIENTATION: MID;UPPER

## 2023-05-02 ASSESSMENT — PAIN DESCRIPTION - FREQUENCY
FREQUENCY: CONTINUOUS

## 2023-05-02 ASSESSMENT — PAIN SCALES - WONG BAKER: WONGBAKER_NUMERICALRESPONSE: 0

## 2023-05-02 NOTE — FLOWSHEET NOTE
Patient admitted to ARU with Dx of endocarditis of prosthetic aortic valve. A/Ox4. Transfers with stedy x2. Mobility restrictions: pacemaker, sternal and aspiration precautions. On regular; 5 carb; low fat; low cholesterol; high fiber; JACQUELIN; 2000 mL FR diet, tolerating well. Medications taken whole with thins. On lovenox for DVT prophylaxis. Skin: incisions to center and left chest; redness to right leg. Oxygen: RA. LDA: PICC right arm. Has been continent of bowel and continent of bladder. LBM 4/30/23. Chair/bed alarms in use and call light in reach. Will monitor for safety.

## 2023-05-02 NOTE — CARE COORDINATION
Chart Reviewed. Called to confirm on follow up visits:     Tuesday 5-9-2023    11:30 am     to [facility] [place] at [address] [facility] for Pacemaker check. Wednesday, 5-   1pm   Endocrinology with Dr Beverly Barnhart    [facility] which will [facility] thru 5-. Pt is active with [facility].   [place], [place]     Case Management   [phone]    5/2/2023  11:01 AM

## 2023-05-02 NOTE — FLOWSHEET NOTE
Patient's b;ppd sugar at hs is only 51. Patient is alert and oriented. Stood up earlier to use his urinal with one assist, using his walker and gaitbelt to keep him steady. Took his hs pills with cold water. Lispro not given since paramaters are not met but he is on routine Lantus bid. Notified Dr. Jose Greenwood by phone. Jose Greenwood confirmed order to hold Lantus tonight as well and just monitor patient throughout the shift. PRN glucose tablets given which patient dislikes but has to be persuaded to take the whole dose. Rechecked after 15 mins and blood sugar only at 62 after being given hs snacks. Will re-check glucose again and advised patient it is going to be monitored. Patient verbalized he understood. Blood sugar re-check 15 mins after prn glucose tablets. Now up to 88. Patient has no complaints. He is on fluid restriction of 2000ml as well as aspiration precautions. Glucose re-check at 2am is up to 212.

## 2023-05-03 LAB
BACTERIA URNS QL MICRO: ABNORMAL /HPF
BILIRUB UR QL STRIP.AUTO: NEGATIVE
BUDDING YEAST: PRESENT
CLARITY UR: CLEAR
COLOR UR: YELLOW
EPI CELLS #/AREA URNS AUTO: 1 /HPF (ref 0–5)
GLUCOSE BLD-MCNC: 111 MG/DL (ref 70–99)
GLUCOSE BLD-MCNC: 132 MG/DL (ref 70–99)
GLUCOSE BLD-MCNC: 166 MG/DL (ref 70–99)
GLUCOSE BLD-MCNC: 166 MG/DL (ref 70–99)
GLUCOSE BLD-MCNC: 96 MG/DL (ref 70–99)
GLUCOSE UR STRIP.AUTO-MCNC: NEGATIVE MG/DL
HGB UR QL STRIP.AUTO: NEGATIVE
HYALINE CASTS #/AREA URNS AUTO: 8 /LPF (ref 0–8)
KETONES UR STRIP.AUTO-MCNC: NEGATIVE MG/DL
LEUKOCYTE ESTERASE UR QL STRIP.AUTO: NEGATIVE
MUCUS: PRESENT
NITRITE UR QL STRIP.AUTO: NEGATIVE
PERFORMED ON: ABNORMAL
PERFORMED ON: NORMAL
PH UR STRIP.AUTO: 5.5 [PH] (ref 5–8)
PROT UR STRIP.AUTO-MCNC: 30 MG/DL
RBC CLUMPS #/AREA URNS AUTO: 1 /HPF (ref 0–4)
SP GR UR STRIP.AUTO: 1.02 (ref 1–1.03)
UA COMPLETE W REFLEX CULTURE PNL UR: ABNORMAL
UA DIPSTICK W REFLEX MICRO PNL UR: YES
URN SPEC COLLECT METH UR: ABNORMAL
UROBILINOGEN UR STRIP-ACNC: 0.2 E.U./DL
WBC #/AREA URNS AUTO: 0 /HPF (ref 0–5)

## 2023-05-03 PROCEDURE — 97530 THERAPEUTIC ACTIVITIES: CPT

## 2023-05-03 PROCEDURE — 97130 THER IVNTJ EA ADDL 15 MIN: CPT

## 2023-05-03 PROCEDURE — 92526 ORAL FUNCTION THERAPY: CPT

## 2023-05-03 PROCEDURE — 97110 THERAPEUTIC EXERCISES: CPT

## 2023-05-03 PROCEDURE — 6360000002 HC RX W HCPCS: Performed by: PHYSICAL MEDICINE & REHABILITATION

## 2023-05-03 PROCEDURE — 97129 THER IVNTJ 1ST 15 MIN: CPT

## 2023-05-03 PROCEDURE — 97535 SELF CARE MNGMENT TRAINING: CPT

## 2023-05-03 PROCEDURE — 81001 URINALYSIS AUTO W/SCOPE: CPT

## 2023-05-03 PROCEDURE — 2580000003 HC RX 258: Performed by: PHYSICAL MEDICINE & REHABILITATION

## 2023-05-03 PROCEDURE — 1280000000 HC REHAB R&B

## 2023-05-03 PROCEDURE — 97116 GAIT TRAINING THERAPY: CPT

## 2023-05-03 PROCEDURE — 87086 URINE CULTURE/COLONY COUNT: CPT

## 2023-05-03 PROCEDURE — 6370000000 HC RX 637 (ALT 250 FOR IP): Performed by: PHYSICAL MEDICINE & REHABILITATION

## 2023-05-03 PROCEDURE — 94760 N-INVAS EAR/PLS OXIMETRY 1: CPT

## 2023-05-03 RX ADMIN — AMPICILLIN SODIUM 2000 MG: 2 INJECTION, POWDER, FOR SOLUTION INTRAMUSCULAR; INTRAVENOUS at 16:03

## 2023-05-03 RX ADMIN — Medication 10 ML: at 15:59

## 2023-05-03 RX ADMIN — METOPROLOL TARTRATE 12.5 MG: 25 TABLET, FILM COATED ORAL at 09:25

## 2023-05-03 RX ADMIN — TORSEMIDE 20 MG: 20 TABLET ORAL at 09:26

## 2023-05-03 RX ADMIN — THERA TABS 1 TABLET: TAB at 09:12

## 2023-05-03 RX ADMIN — OXYCODONE HYDROCHLORIDE 10 MG: 10 TABLET ORAL at 08:10

## 2023-05-03 RX ADMIN — INSULIN LISPRO 5 UNITS: 100 INJECTION, SOLUTION INTRAVENOUS; SUBCUTANEOUS at 16:48

## 2023-05-03 RX ADMIN — SENNOSIDES AND DOCUSATE SODIUM 1 TABLET: 50; 8.6 TABLET ORAL at 09:12

## 2023-05-03 RX ADMIN — INSULIN GLARGINE 14 UNITS: 100 INJECTION, SOLUTION SUBCUTANEOUS at 20:34

## 2023-05-03 RX ADMIN — SODIUM CHLORIDE, PRESERVATIVE FREE 10 ML: 5 INJECTION INTRAVENOUS at 20:44

## 2023-05-03 RX ADMIN — AMPICILLIN SODIUM 2000 MG: 2 INJECTION, POWDER, FOR SOLUTION INTRAMUSCULAR; INTRAVENOUS at 04:10

## 2023-05-03 RX ADMIN — GUAIFENESIN 600 MG: 600 TABLET ORAL at 20:36

## 2023-05-03 RX ADMIN — INSULIN LISPRO 5 UNITS: 100 INJECTION, SOLUTION INTRAVENOUS; SUBCUTANEOUS at 11:39

## 2023-05-03 RX ADMIN — PANTOPRAZOLE SODIUM 40 MG: 40 TABLET, DELAYED RELEASE ORAL at 06:38

## 2023-05-03 RX ADMIN — AMPICILLIN SODIUM 2000 MG: 2 INJECTION, POWDER, FOR SOLUTION INTRAMUSCULAR; INTRAVENOUS at 11:31

## 2023-05-03 RX ADMIN — OXYCODONE HYDROCHLORIDE 10 MG: 10 TABLET ORAL at 20:36

## 2023-05-03 RX ADMIN — Medication 200 MG: at 07:33

## 2023-05-03 RX ADMIN — CEFTRIAXONE SODIUM 2000 MG: 2 INJECTION, POWDER, FOR SOLUTION INTRAMUSCULAR; INTRAVENOUS at 12:16

## 2023-05-03 RX ADMIN — ACETAMINOPHEN 1000 MG: 500 TABLET ORAL at 06:07

## 2023-05-03 RX ADMIN — Medication 10 ML: at 16:42

## 2023-05-03 RX ADMIN — Medication 10 ML: at 12:10

## 2023-05-03 RX ADMIN — SENNOSIDES AND DOCUSATE SODIUM 1 TABLET: 50; 8.6 TABLET ORAL at 20:36

## 2023-05-03 RX ADMIN — Medication 6 MG: at 20:38

## 2023-05-03 RX ADMIN — OXYCODONE HYDROCHLORIDE 10 MG: 10 TABLET ORAL at 15:27

## 2023-05-03 RX ADMIN — Medication 10 ML: at 08:06

## 2023-05-03 RX ADMIN — SODIUM CHLORIDE, PRESERVATIVE FREE 10 ML: 5 INJECTION INTRAVENOUS at 07:23

## 2023-05-03 RX ADMIN — ATORVASTATIN CALCIUM 40 MG: 40 TABLET, FILM COATED ORAL at 20:38

## 2023-05-03 RX ADMIN — AMPICILLIN SODIUM 2000 MG: 2 INJECTION, POWDER, FOR SOLUTION INTRAMUSCULAR; INTRAVENOUS at 00:00

## 2023-05-03 RX ADMIN — Medication 1000 UNITS: at 09:12

## 2023-05-03 RX ADMIN — ENOXAPARIN SODIUM 40 MG: 100 INJECTION SUBCUTANEOUS at 09:26

## 2023-05-03 RX ADMIN — AMPICILLIN SODIUM 2000 MG: 2 INJECTION, POWDER, FOR SOLUTION INTRAMUSCULAR; INTRAVENOUS at 20:28

## 2023-05-03 RX ADMIN — ACETAMINOPHEN 1000 MG: 500 TABLET ORAL at 20:36

## 2023-05-03 RX ADMIN — Medication 10 ML: at 12:50

## 2023-05-03 RX ADMIN — QUETIAPINE FUMARATE 50 MG: 25 TABLET ORAL at 20:38

## 2023-05-03 RX ADMIN — GUAIFENESIN 600 MG: 600 TABLET ORAL at 09:12

## 2023-05-03 RX ADMIN — FLUOXETINE 60 MG: 20 CAPSULE ORAL at 09:12

## 2023-05-03 RX ADMIN — METOPROLOL TARTRATE 12.5 MG: 25 TABLET, FILM COATED ORAL at 20:38

## 2023-05-03 RX ADMIN — ACETAMINOPHEN 1000 MG: 500 TABLET ORAL at 13:54

## 2023-05-03 RX ADMIN — AMPICILLIN SODIUM 2000 MG: 2 INJECTION, POWDER, FOR SOLUTION INTRAMUSCULAR; INTRAVENOUS at 07:28

## 2023-05-03 RX ADMIN — CEFTRIAXONE SODIUM 2000 MG: 2 INJECTION, POWDER, FOR SOLUTION INTRAMUSCULAR; INTRAVENOUS at 00:05

## 2023-05-03 RX ADMIN — Medication 10 ML: at 12:08

## 2023-05-03 RX ADMIN — ALLOPURINOL 600 MG: 300 TABLET ORAL at 09:13

## 2023-05-03 RX ADMIN — Medication 10 ML: at 11:28

## 2023-05-03 RX ADMIN — ASPIRIN 81 MG 324 MG: 81 TABLET ORAL at 09:12

## 2023-05-03 RX ADMIN — INSULIN LISPRO 5 UNITS: 100 INJECTION, SOLUTION INTRAVENOUS; SUBCUTANEOUS at 07:32

## 2023-05-03 ASSESSMENT — PAIN DESCRIPTION - LOCATION
LOCATION: CHEST
LOCATION: CHEST
LOCATION: GENERALIZED

## 2023-05-03 ASSESSMENT — PAIN DESCRIPTION - PAIN TYPE
TYPE: ACUTE PAIN
TYPE: ACUTE PAIN

## 2023-05-03 ASSESSMENT — PAIN SCALES - GENERAL
PAINLEVEL_OUTOF10: 7
PAINLEVEL_OUTOF10: 5
PAINLEVEL_OUTOF10: 4
PAINLEVEL_OUTOF10: 4
PAINLEVEL_OUTOF10: 7
PAINLEVEL_OUTOF10: 7
PAINLEVEL_OUTOF10: 2
PAINLEVEL_OUTOF10: 2

## 2023-05-03 ASSESSMENT — PAIN DESCRIPTION - ORIENTATION
ORIENTATION: MID;LEFT;RIGHT
ORIENTATION: RIGHT;LEFT;MID

## 2023-05-03 ASSESSMENT — PAIN DESCRIPTION - DESCRIPTORS
DESCRIPTORS: THROBBING
DESCRIPTORS: ACHING
DESCRIPTORS: ACHING;DISCOMFORT
DESCRIPTORS: ACHING;DISCOMFORT

## 2023-05-03 ASSESSMENT — PAIN - FUNCTIONAL ASSESSMENT
PAIN_FUNCTIONAL_ASSESSMENT: ACTIVITIES ARE NOT PREVENTED

## 2023-05-03 ASSESSMENT — PAIN DESCRIPTION - FREQUENCY
FREQUENCY: CONTINUOUS
FREQUENCY: CONTINUOUS

## 2023-05-03 ASSESSMENT — PAIN DESCRIPTION - ONSET
ONSET: ON-GOING
ONSET: ON-GOING

## 2023-05-03 NOTE — CARE COORDINATION
Met with wife to introduce  role, initiate discussion regarding DC planning, complete ACP and to inform of weekly Team Conferences on Thursdays. Reviewed Follow up visits with wife. We will need to keep the appt for Tuesday 5-9-2023 for pacemaker check. SOCIAL WORK ASSESSMENT      GOAL:   Goal is to return home. She wants to keep him out of the nursing facilities. HOME SITUATION:  Pt and wife live in a house with two steps with handrail to enter first floor living. Currently, their son and dil are staying with wife while he is in hospital just for support. Wife is totally independent and drives. She is not sure if they will continue to stay there once he returns home. Both son and dil work outside the home. Wife reports he has been in [facility] twice this year. He was currently active with [facility] as he has home IV therapy. She would like to resume with [facility]. He was independent at [facility] with his personal care needs. Wife took over all home maintenance needs and they share in finances. Wife also oversees his medication management. Pcp:  Dr Baptiste Mins    last visit was 1-2023    Pharmacy:   [facility]:  [address] and [address]        PRIOR LEVEL OF FUNCTIONING:       PERSONAL CARE:    independent                                                                       DRIVES:  no                                                                     FINANCES: shares                                                                    MEALS:   wife                              GROCERY SHOPS:  wife      DME CURRENTLY AT HOME:  shower chair with a back, bars on toilet and in shower, walker, cane, 4 wh walker, a lift chair that is broken       CURRENT HOME CARE/SERVICES:  Active with [facility] for RN/pt/ot and wants to resume.     The Plan for Transition of Care is related to the following treatment goals: to continue his

## 2023-05-03 NOTE — CARE COORDINATION
Spoke with Dr Tasha Rios about upcoming appts. She would suggest postponing both appts with hopes to schedule after his release by end of week next week. Called to [facility] for Tuesday 5-9-2023 and was told he needs to be seen within two weeks of his pacemaker placement which was on April 25. I kept this appt.     Called To Endocrinology Office, Dr Joel Blanco and was told this was already called in to cancel and they have no appts available until Sept.    Rocío Jacobson [place]     Case Management   [phone]    5/3/2023  10:29 AM

## 2023-05-04 LAB
ALBUMIN SERPL-MCNC: 3.1 G/DL (ref 3.4–5)
ALBUMIN/GLOB SERPL: 1.1 {RATIO} (ref 1.1–2.2)
ALP SERPL-CCNC: 79 U/L (ref 40–129)
ALT SERPL-CCNC: 8 U/L (ref 10–40)
ANION GAP SERPL CALCULATED.3IONS-SCNC: 11 MMOL/L (ref 3–16)
AST SERPL-CCNC: 16 U/L (ref 15–37)
BACTERIA UR CULT: NORMAL
BASOPHILS # BLD: 0 K/UL (ref 0–0.2)
BASOPHILS NFR BLD: 0.4 %
BILIRUB SERPL-MCNC: <0.2 MG/DL (ref 0–1)
BUN SERPL-MCNC: 23 MG/DL (ref 7–20)
CALCIUM SERPL-MCNC: 8.3 MG/DL (ref 8.3–10.6)
CHLORIDE SERPL-SCNC: 107 MMOL/L (ref 99–110)
CO2 SERPL-SCNC: 25 MMOL/L (ref 21–32)
CREAT SERPL-MCNC: 0.9 MG/DL (ref 0.8–1.3)
DEPRECATED RDW RBC AUTO: 17.8 % (ref 12.4–15.4)
EOSINOPHIL # BLD: 0.3 K/UL (ref 0–0.6)
EOSINOPHIL NFR BLD: 4.1 %
GFR SERPLBLD CREATININE-BSD FMLA CKD-EPI: >60 ML/MIN/{1.73_M2}
GLUCOSE BLD-MCNC: 104 MG/DL (ref 70–99)
GLUCOSE BLD-MCNC: 119 MG/DL (ref 70–99)
GLUCOSE BLD-MCNC: 147 MG/DL (ref 70–99)
GLUCOSE BLD-MCNC: 151 MG/DL (ref 70–99)
GLUCOSE BLD-MCNC: 173 MG/DL (ref 70–99)
GLUCOSE SERPL-MCNC: 141 MG/DL (ref 70–99)
HCT VFR BLD AUTO: 24.4 % (ref 40.5–52.5)
HGB BLD-MCNC: 8 G/DL (ref 13.5–17.5)
LYMPHOCYTES # BLD: 1.3 K/UL (ref 1–5.1)
LYMPHOCYTES NFR BLD: 17.2 %
MAGNESIUM SERPL-MCNC: 1.7 MG/DL (ref 1.8–2.4)
MCH RBC QN AUTO: 30.6 PG (ref 26–34)
MCHC RBC AUTO-ENTMCNC: 32.7 G/DL (ref 31–36)
MCV RBC AUTO: 93.6 FL (ref 80–100)
MONOCYTES # BLD: 0.7 K/UL (ref 0–1.3)
MONOCYTES NFR BLD: 9.3 %
NEUTROPHILS # BLD: 5.4 K/UL (ref 1.7–7.7)
NEUTROPHILS NFR BLD: 69 %
PERFORMED ON: ABNORMAL
PLATELET # BLD AUTO: 334 K/UL (ref 135–450)
PMV BLD AUTO: 8.4 FL (ref 5–10.5)
POTASSIUM SERPL-SCNC: 3.3 MMOL/L (ref 3.5–5.1)
PROT SERPL-MCNC: 6 G/DL (ref 6.4–8.2)
RBC # BLD AUTO: 2.61 M/UL (ref 4.2–5.9)
SODIUM SERPL-SCNC: 143 MMOL/L (ref 136–145)
WBC # BLD AUTO: 7.8 K/UL (ref 4–11)

## 2023-05-04 PROCEDURE — 97110 THERAPEUTIC EXERCISES: CPT | Performed by: PHYSICAL THERAPIST

## 2023-05-04 PROCEDURE — 92526 ORAL FUNCTION THERAPY: CPT

## 2023-05-04 PROCEDURE — 83735 ASSAY OF MAGNESIUM: CPT

## 2023-05-04 PROCEDURE — 97130 THER IVNTJ EA ADDL 15 MIN: CPT

## 2023-05-04 PROCEDURE — 97530 THERAPEUTIC ACTIVITIES: CPT

## 2023-05-04 PROCEDURE — 97116 GAIT TRAINING THERAPY: CPT | Performed by: PHYSICAL THERAPIST

## 2023-05-04 PROCEDURE — 1280000000 HC REHAB R&B

## 2023-05-04 PROCEDURE — 6370000000 HC RX 637 (ALT 250 FOR IP): Performed by: PHYSICAL MEDICINE & REHABILITATION

## 2023-05-04 PROCEDURE — 6360000002 HC RX W HCPCS: Performed by: PHYSICAL MEDICINE & REHABILITATION

## 2023-05-04 PROCEDURE — 92507 TX SP LANG VOICE COMM INDIV: CPT

## 2023-05-04 PROCEDURE — 97530 THERAPEUTIC ACTIVITIES: CPT | Performed by: PHYSICAL THERAPIST

## 2023-05-04 PROCEDURE — 97129 THER IVNTJ 1ST 15 MIN: CPT

## 2023-05-04 PROCEDURE — 80053 COMPREHEN METABOLIC PANEL: CPT

## 2023-05-04 PROCEDURE — 85025 COMPLETE CBC W/AUTO DIFF WBC: CPT

## 2023-05-04 PROCEDURE — 2580000003 HC RX 258: Performed by: PHYSICAL MEDICINE & REHABILITATION

## 2023-05-04 RX ORDER — POTASSIUM CHLORIDE 20 MEQ/1
40 TABLET, EXTENDED RELEASE ORAL ONCE
Status: COMPLETED | OUTPATIENT
Start: 2023-05-04 | End: 2023-05-04

## 2023-05-04 RX ORDER — LANOLIN ALCOHOL/MO/W.PET/CERES
400 CREAM (GRAM) TOPICAL
Status: DISCONTINUED | OUTPATIENT
Start: 2023-05-05 | End: 2023-05-08

## 2023-05-04 RX ADMIN — INSULIN LISPRO 5 UNITS: 100 INJECTION, SOLUTION INTRAVENOUS; SUBCUTANEOUS at 16:31

## 2023-05-04 RX ADMIN — ENOXAPARIN SODIUM 40 MG: 100 INJECTION SUBCUTANEOUS at 07:39

## 2023-05-04 RX ADMIN — AMPICILLIN SODIUM 2000 MG: 2 INJECTION, POWDER, FOR SOLUTION INTRAMUSCULAR; INTRAVENOUS at 21:17

## 2023-05-04 RX ADMIN — AMPICILLIN SODIUM 2000 MG: 2 INJECTION, POWDER, FOR SOLUTION INTRAMUSCULAR; INTRAVENOUS at 12:29

## 2023-05-04 RX ADMIN — AMPICILLIN SODIUM 2000 MG: 2 INJECTION, POWDER, FOR SOLUTION INTRAMUSCULAR; INTRAVENOUS at 08:06

## 2023-05-04 RX ADMIN — ASPIRIN 81 MG 324 MG: 81 TABLET ORAL at 07:37

## 2023-05-04 RX ADMIN — AMPICILLIN SODIUM 2000 MG: 2 INJECTION, POWDER, FOR SOLUTION INTRAMUSCULAR; INTRAVENOUS at 00:30

## 2023-05-04 RX ADMIN — OXYCODONE HYDROCHLORIDE 10 MG: 10 TABLET ORAL at 18:21

## 2023-05-04 RX ADMIN — PANTOPRAZOLE SODIUM 40 MG: 40 TABLET, DELAYED RELEASE ORAL at 06:15

## 2023-05-04 RX ADMIN — ATORVASTATIN CALCIUM 40 MG: 40 TABLET, FILM COATED ORAL at 21:09

## 2023-05-04 RX ADMIN — CEFTRIAXONE SODIUM 2000 MG: 2 INJECTION, POWDER, FOR SOLUTION INTRAMUSCULAR; INTRAVENOUS at 11:58

## 2023-05-04 RX ADMIN — ACETAMINOPHEN 1000 MG: 500 TABLET ORAL at 21:10

## 2023-05-04 RX ADMIN — QUETIAPINE FUMARATE 50 MG: 25 TABLET ORAL at 21:09

## 2023-05-04 RX ADMIN — SODIUM CHLORIDE, PRESERVATIVE FREE 10 ML: 5 INJECTION INTRAVENOUS at 08:46

## 2023-05-04 RX ADMIN — POTASSIUM CHLORIDE 40 MEQ: 1500 TABLET, EXTENDED RELEASE ORAL at 11:41

## 2023-05-04 RX ADMIN — AMPICILLIN SODIUM 2000 MG: 2 INJECTION, POWDER, FOR SOLUTION INTRAMUSCULAR; INTRAVENOUS at 16:51

## 2023-05-04 RX ADMIN — INSULIN LISPRO 5 UNITS: 100 INJECTION, SOLUTION INTRAVENOUS; SUBCUTANEOUS at 11:41

## 2023-05-04 RX ADMIN — TORSEMIDE 20 MG: 20 TABLET ORAL at 07:39

## 2023-05-04 RX ADMIN — SODIUM CHLORIDE, PRESERVATIVE FREE 10 ML: 5 INJECTION INTRAVENOUS at 21:05

## 2023-05-04 RX ADMIN — INSULIN GLARGINE 14 UNITS: 100 INJECTION, SOLUTION SUBCUTANEOUS at 21:09

## 2023-05-04 RX ADMIN — AMPICILLIN SODIUM 2000 MG: 2 INJECTION, POWDER, FOR SOLUTION INTRAMUSCULAR; INTRAVENOUS at 04:14

## 2023-05-04 RX ADMIN — GUAIFENESIN 600 MG: 600 TABLET ORAL at 21:10

## 2023-05-04 RX ADMIN — METOPROLOL TARTRATE 12.5 MG: 25 TABLET, FILM COATED ORAL at 07:38

## 2023-05-04 RX ADMIN — ACETAMINOPHEN 1000 MG: 500 TABLET ORAL at 14:32

## 2023-05-04 RX ADMIN — SODIUM CHLORIDE 25 ML: 9 INJECTION, SOLUTION INTRAVENOUS at 21:06

## 2023-05-04 RX ADMIN — Medication 200 MG: at 07:38

## 2023-05-04 RX ADMIN — ACETAMINOPHEN 1000 MG: 500 TABLET ORAL at 06:15

## 2023-05-04 RX ADMIN — THERA TABS 1 TABLET: TAB at 07:38

## 2023-05-04 RX ADMIN — Medication 6 MG: at 21:09

## 2023-05-04 RX ADMIN — GUAIFENESIN 600 MG: 600 TABLET ORAL at 07:39

## 2023-05-04 RX ADMIN — SENNOSIDES AND DOCUSATE SODIUM 1 TABLET: 50; 8.6 TABLET ORAL at 07:38

## 2023-05-04 RX ADMIN — ALLOPURINOL 600 MG: 300 TABLET ORAL at 07:39

## 2023-05-04 RX ADMIN — METOPROLOL TARTRATE 12.5 MG: 25 TABLET, FILM COATED ORAL at 21:10

## 2023-05-04 RX ADMIN — FLUOXETINE 60 MG: 20 CAPSULE ORAL at 07:37

## 2023-05-04 RX ADMIN — Medication 1000 UNITS: at 07:39

## 2023-05-04 RX ADMIN — CEFTRIAXONE SODIUM 2000 MG: 2 INJECTION, POWDER, FOR SOLUTION INTRAMUSCULAR; INTRAVENOUS at 00:30

## 2023-05-04 ASSESSMENT — PAIN SCALES - GENERAL
PAINLEVEL_OUTOF10: 0
PAINLEVEL_OUTOF10: 7
PAINLEVEL_OUTOF10: 0
PAINLEVEL_OUTOF10: 0
PAINLEVEL_OUTOF10: 3
PAINLEVEL_OUTOF10: 7
PAINLEVEL_OUTOF10: 0

## 2023-05-04 ASSESSMENT — PAIN DESCRIPTION - PAIN TYPE: TYPE: ACUTE PAIN

## 2023-05-04 ASSESSMENT — PAIN DESCRIPTION - ORIENTATION: ORIENTATION: LEFT;MID

## 2023-05-04 ASSESSMENT — PAIN DESCRIPTION - LOCATION
LOCATION: CHEST
LOCATION: CHEST;SHOULDER

## 2023-05-04 ASSESSMENT — PAIN DESCRIPTION - ONSET: ONSET: ON-GOING

## 2023-05-04 ASSESSMENT — PAIN DESCRIPTION - FREQUENCY: FREQUENCY: CONTINUOUS

## 2023-05-04 ASSESSMENT — PAIN DESCRIPTION - DESCRIPTORS
DESCRIPTORS: ACHING
DESCRIPTORS: ACHING

## 2023-05-04 ASSESSMENT — PAIN DESCRIPTION - DIRECTION: RADIATING_TOWARDS: CON

## 2023-05-04 NOTE — CARE COORDINATION
Referral made for Stretcher transport to [facility]  for following appt:    Tuesday, May 9 att 11:30 am  [facility] [place]  [facility] [id number] [facility], [address] [place] [address]     Case Management   [phone]    5/4/2023  12:21 PM

## 2023-05-04 NOTE — CARE COORDINATION
Team Conference held today. Team reviewed progress, barriers:  cognition, sternal precautions, posterior lean, shoulder problems and lethargy. Team recommends continued stay on rehab with anticipated DC date of Wed 5-. Home Care for RN/pt/ot/HHa. Team is recommending Family education upon closer to DC. Met with patient and wife at bedside to review. They are in agreement with this plan although pt would like it sooner if possible. They confirmed goal is to return home and resume [facility].   Abelino Padilla [place]     Case Management   [phone]    5/4/2023  3:25 PM

## 2023-05-04 NOTE — ACP (ADVANCE CARE PLANNING)
Advance Care Planning     Advance Care Planning Activator (Inpatient)  Conversation Note      Date of ACP Conversation: 5/4/2023     Conversation Conducted with:   patient and wife Patient with Decision Making Capacity   Healthcare Decision Maker: Next of Kin by law (only applies in absence of above) (name) WifeBeck    ACP Activator: Ricardo Manzo, [address] [facility] Decision Maker:     Current Designated Health Care Decision Maker:     Primary Decision Maker: Sarah Derby - [phone]  Click here to complete Healthcare Decision Makers including section of the Healthcare Decision Maker Relationship (ie \"Primary\")      Care Preferences    Ventilation: \"If you were in your present state of health and suddenly became very ill and were unable to breathe on your own, what would your preference be about the use of a ventilator (breathing machine) if it were available to you? \"      Would the patient desire the use of ventilator (breathing machine)?: yes    \"If your health worsens and it becomes clear that your chance of recovery is unlikely, what would your preference be about the use of a ventilator (breathing machine) if it were available to you? \"     Would the patient desire the use of ventilator (breathing machine)?: No      Resuscitation  \"CPR works best to restart the heart when there is a sudden event, like a heart attack, in someone who is otherwise healthy. Unfortunately, CPR does not typically restart the heart for people who have serious health conditions or who are very sick. \"    \"In the event your heart stopped as a result of an underlying serious health condition, would you want attempts to be made to restart your heart (answer \"yes\" for attempt to resuscitate) or would you prefer a natural death (answer \"no\" for do not attempt to resuscitate)? \" yes       [] Yes   [] No   Educated Patient / Bridget Hancock regarding differences between Advance Directives and portable DNR

## 2023-05-05 LAB
GLUCOSE BLD-MCNC: 126 MG/DL (ref 70–99)
GLUCOSE BLD-MCNC: 154 MG/DL (ref 70–99)
GLUCOSE BLD-MCNC: 163 MG/DL (ref 70–99)
GLUCOSE BLD-MCNC: 84 MG/DL (ref 70–99)
GLUCOSE BLD-MCNC: 86 MG/DL (ref 70–99)
PERFORMED ON: ABNORMAL
PERFORMED ON: NORMAL
PERFORMED ON: NORMAL

## 2023-05-05 PROCEDURE — 97130 THER IVNTJ EA ADDL 15 MIN: CPT

## 2023-05-05 PROCEDURE — 1280000000 HC REHAB R&B

## 2023-05-05 PROCEDURE — 97110 THERAPEUTIC EXERCISES: CPT

## 2023-05-05 PROCEDURE — 2580000003 HC RX 258: Performed by: PHYSICAL MEDICINE & REHABILITATION

## 2023-05-05 PROCEDURE — 6370000000 HC RX 637 (ALT 250 FOR IP): Performed by: PHYSICAL MEDICINE & REHABILITATION

## 2023-05-05 PROCEDURE — 97116 GAIT TRAINING THERAPY: CPT

## 2023-05-05 PROCEDURE — 97535 SELF CARE MNGMENT TRAINING: CPT

## 2023-05-05 PROCEDURE — 97530 THERAPEUTIC ACTIVITIES: CPT

## 2023-05-05 PROCEDURE — 2500000003 HC RX 250 WO HCPCS: Performed by: PHYSICAL MEDICINE & REHABILITATION

## 2023-05-05 PROCEDURE — 97129 THER IVNTJ 1ST 15 MIN: CPT

## 2023-05-05 PROCEDURE — 6360000002 HC RX W HCPCS: Performed by: PHYSICAL MEDICINE & REHABILITATION

## 2023-05-05 RX ADMIN — Medication 1000 UNITS: at 07:51

## 2023-05-05 RX ADMIN — SODIUM CHLORIDE, PRESERVATIVE FREE 10 ML: 5 INJECTION INTRAVENOUS at 07:37

## 2023-05-05 RX ADMIN — METOPROLOL TARTRATE 12.5 MG: 25 TABLET, FILM COATED ORAL at 07:52

## 2023-05-05 RX ADMIN — AMPICILLIN SODIUM 2000 MG: 2 INJECTION, POWDER, FOR SOLUTION INTRAMUSCULAR; INTRAVENOUS at 20:37

## 2023-05-05 RX ADMIN — SODIUM CHLORIDE 25 ML: 9 INJECTION, SOLUTION INTRAVENOUS at 20:37

## 2023-05-05 RX ADMIN — ENOXAPARIN SODIUM 40 MG: 100 INJECTION SUBCUTANEOUS at 07:52

## 2023-05-05 RX ADMIN — OXYCODONE HYDROCHLORIDE 10 MG: 10 TABLET ORAL at 15:34

## 2023-05-05 RX ADMIN — INSULIN LISPRO 5 UNITS: 100 INJECTION, SOLUTION INTRAVENOUS; SUBCUTANEOUS at 11:43

## 2023-05-05 RX ADMIN — TORSEMIDE 20 MG: 20 TABLET ORAL at 07:38

## 2023-05-05 RX ADMIN — AMPICILLIN SODIUM 2000 MG: 2 INJECTION, POWDER, FOR SOLUTION INTRAMUSCULAR; INTRAVENOUS at 04:20

## 2023-05-05 RX ADMIN — ACETAMINOPHEN 1000 MG: 500 TABLET ORAL at 06:01

## 2023-05-05 RX ADMIN — QUETIAPINE FUMARATE 50 MG: 25 TABLET ORAL at 21:59

## 2023-05-05 RX ADMIN — THERA TABS 1 TABLET: TAB at 07:39

## 2023-05-05 RX ADMIN — MICONAZOLE NITRATE: 2 POWDER TOPICAL at 22:35

## 2023-05-05 RX ADMIN — ALLOPURINOL 600 MG: 300 TABLET ORAL at 07:51

## 2023-05-05 RX ADMIN — ATORVASTATIN CALCIUM 40 MG: 40 TABLET, FILM COATED ORAL at 22:00

## 2023-05-05 RX ADMIN — Medication 6 MG: at 22:03

## 2023-05-05 RX ADMIN — ASPIRIN 81 MG 324 MG: 81 TABLET ORAL at 07:39

## 2023-05-05 RX ADMIN — METOPROLOL TARTRATE 12.5 MG: 25 TABLET, FILM COATED ORAL at 22:05

## 2023-05-05 RX ADMIN — INSULIN LISPRO 5 UNITS: 100 INJECTION, SOLUTION INTRAVENOUS; SUBCUTANEOUS at 08:10

## 2023-05-05 RX ADMIN — GUAIFENESIN 600 MG: 600 TABLET ORAL at 07:51

## 2023-05-05 RX ADMIN — CEFTRIAXONE SODIUM 2000 MG: 2 INJECTION, POWDER, FOR SOLUTION INTRAMUSCULAR; INTRAVENOUS at 12:40

## 2023-05-05 RX ADMIN — AMPICILLIN SODIUM 2000 MG: 2 INJECTION, POWDER, FOR SOLUTION INTRAMUSCULAR; INTRAVENOUS at 07:31

## 2023-05-05 RX ADMIN — PANTOPRAZOLE SODIUM 40 MG: 40 TABLET, DELAYED RELEASE ORAL at 06:01

## 2023-05-05 RX ADMIN — GUAIFENESIN 600 MG: 600 TABLET ORAL at 21:59

## 2023-05-05 RX ADMIN — AMPICILLIN SODIUM 2000 MG: 2 INJECTION, POWDER, FOR SOLUTION INTRAMUSCULAR; INTRAVENOUS at 11:42

## 2023-05-05 RX ADMIN — FLUOXETINE 60 MG: 20 CAPSULE ORAL at 07:52

## 2023-05-05 RX ADMIN — SODIUM CHLORIDE, PRESERVATIVE FREE 10 ML: 5 INJECTION INTRAVENOUS at 20:33

## 2023-05-05 RX ADMIN — SENNOSIDES AND DOCUSATE SODIUM 1 TABLET: 50; 8.6 TABLET ORAL at 07:51

## 2023-05-05 RX ADMIN — OXYCODONE HYDROCHLORIDE 10 MG: 10 TABLET ORAL at 08:09

## 2023-05-05 RX ADMIN — CEFTRIAXONE SODIUM 2000 MG: 2 INJECTION, POWDER, FOR SOLUTION INTRAMUSCULAR; INTRAVENOUS at 00:10

## 2023-05-05 RX ADMIN — ACETAMINOPHEN 1000 MG: 500 TABLET ORAL at 22:00

## 2023-05-05 RX ADMIN — Medication 400 MG: at 07:38

## 2023-05-05 RX ADMIN — INSULIN GLARGINE 14 UNITS: 100 INJECTION, SOLUTION SUBCUTANEOUS at 22:27

## 2023-05-05 RX ADMIN — AMPICILLIN SODIUM 2000 MG: 2 INJECTION, POWDER, FOR SOLUTION INTRAMUSCULAR; INTRAVENOUS at 16:21

## 2023-05-05 RX ADMIN — ACETAMINOPHEN 1000 MG: 500 TABLET ORAL at 14:10

## 2023-05-05 RX ADMIN — AMPICILLIN SODIUM 2000 MG: 2 INJECTION, POWDER, FOR SOLUTION INTRAMUSCULAR; INTRAVENOUS at 00:48

## 2023-05-05 ASSESSMENT — PAIN DESCRIPTION - ORIENTATION
ORIENTATION: MID
ORIENTATION: INNER
ORIENTATION: MID

## 2023-05-05 ASSESSMENT — PAIN DESCRIPTION - ONSET
ONSET: ON-GOING

## 2023-05-05 ASSESSMENT — PAIN SCALES - GENERAL
PAINLEVEL_OUTOF10: 3
PAINLEVEL_OUTOF10: 5
PAINLEVEL_OUTOF10: 5
PAINLEVEL_OUTOF10: 8
PAINLEVEL_OUTOF10: 7
PAINLEVEL_OUTOF10: 0
PAINLEVEL_OUTOF10: 0
PAINLEVEL_OUTOF10: 5
PAINLEVEL_OUTOF10: 7
PAINLEVEL_OUTOF10: 7

## 2023-05-05 ASSESSMENT — PAIN DESCRIPTION - FREQUENCY
FREQUENCY: CONTINUOUS

## 2023-05-05 ASSESSMENT — PAIN DESCRIPTION - DESCRIPTORS
DESCRIPTORS: ACHING
DESCRIPTORS: ACHING;DISCOMFORT
DESCRIPTORS: ACHING
DESCRIPTORS: ACHING

## 2023-05-05 ASSESSMENT — PAIN DESCRIPTION - PAIN TYPE
TYPE: ACUTE PAIN

## 2023-05-05 ASSESSMENT — PAIN DESCRIPTION - LOCATION
LOCATION: CHEST
LOCATION: CHEST
LOCATION: STERNUM;RIB CAGE
LOCATION: CHEST

## 2023-05-05 ASSESSMENT — PAIN - FUNCTIONAL ASSESSMENT
PAIN_FUNCTIONAL_ASSESSMENT: PREVENTS OR INTERFERES SOME ACTIVE ACTIVITIES AND ADLS
PAIN_FUNCTIONAL_ASSESSMENT: PREVENTS OR INTERFERES SOME ACTIVE ACTIVITIES AND ADLS

## 2023-05-05 NOTE — CARE COORDINATION
Wound care consulted for rash to groin area. Pt moist under abd folds and in groin. Currently has brief off. Would recommend interdry or micotin powder. Cleanse areas with soap and water and ensure these areas are dry prior to placing clothes. Will not continue to follow. Please re-consult if needed.   Electronically signed by Yaniv Dejesus RN CWOCN on 5/5/2023 at 2:42 PM

## 2023-05-06 LAB
GLUCOSE BLD-MCNC: 138 MG/DL (ref 70–99)
GLUCOSE BLD-MCNC: 144 MG/DL (ref 70–99)
GLUCOSE BLD-MCNC: 185 MG/DL (ref 70–99)
GLUCOSE BLD-MCNC: 90 MG/DL (ref 70–99)
PERFORMED ON: ABNORMAL
PERFORMED ON: NORMAL

## 2023-05-06 PROCEDURE — 6360000002 HC RX W HCPCS: Performed by: PHYSICAL MEDICINE & REHABILITATION

## 2023-05-06 PROCEDURE — 1280000000 HC REHAB R&B

## 2023-05-06 PROCEDURE — 2580000003 HC RX 258: Performed by: PHYSICAL MEDICINE & REHABILITATION

## 2023-05-06 PROCEDURE — 6370000000 HC RX 637 (ALT 250 FOR IP): Performed by: PHYSICAL MEDICINE & REHABILITATION

## 2023-05-06 RX ADMIN — SODIUM CHLORIDE 25 ML: 9 INJECTION, SOLUTION INTRAVENOUS at 04:48

## 2023-05-06 RX ADMIN — PANTOPRAZOLE SODIUM 40 MG: 40 TABLET, DELAYED RELEASE ORAL at 05:03

## 2023-05-06 RX ADMIN — ACETAMINOPHEN 1000 MG: 500 TABLET ORAL at 04:52

## 2023-05-06 RX ADMIN — INSULIN LISPRO 5 UNITS: 100 INJECTION, SOLUTION INTRAVENOUS; SUBCUTANEOUS at 11:46

## 2023-05-06 RX ADMIN — TORSEMIDE 20 MG: 20 TABLET ORAL at 08:46

## 2023-05-06 RX ADMIN — AMPICILLIN SODIUM 2000 MG: 2 INJECTION, POWDER, FOR SOLUTION INTRAMUSCULAR; INTRAVENOUS at 04:51

## 2023-05-06 RX ADMIN — ALLOPURINOL 600 MG: 300 TABLET ORAL at 08:46

## 2023-05-06 RX ADMIN — SODIUM CHLORIDE 25 ML: 9 INJECTION, SOLUTION INTRAVENOUS at 00:51

## 2023-05-06 RX ADMIN — AMPICILLIN SODIUM 2000 MG: 2 INJECTION, POWDER, FOR SOLUTION INTRAMUSCULAR; INTRAVENOUS at 20:51

## 2023-05-06 RX ADMIN — ENOXAPARIN SODIUM 40 MG: 100 INJECTION SUBCUTANEOUS at 08:38

## 2023-05-06 RX ADMIN — ATORVASTATIN CALCIUM 40 MG: 40 TABLET, FILM COATED ORAL at 20:37

## 2023-05-06 RX ADMIN — AMPICILLIN SODIUM 2000 MG: 2 INJECTION, POWDER, FOR SOLUTION INTRAMUSCULAR; INTRAVENOUS at 00:52

## 2023-05-06 RX ADMIN — OXYCODONE HYDROCHLORIDE 10 MG: 10 TABLET ORAL at 20:37

## 2023-05-06 RX ADMIN — METOPROLOL TARTRATE 12.5 MG: 25 TABLET, FILM COATED ORAL at 20:34

## 2023-05-06 RX ADMIN — GUAIFENESIN 600 MG: 600 TABLET ORAL at 20:37

## 2023-05-06 RX ADMIN — QUETIAPINE FUMARATE 50 MG: 25 TABLET ORAL at 20:33

## 2023-05-06 RX ADMIN — SODIUM CHLORIDE 25 ML: 9 INJECTION, SOLUTION INTRAVENOUS at 20:48

## 2023-05-06 RX ADMIN — INSULIN LISPRO 5 UNITS: 100 INJECTION, SOLUTION INTRAVENOUS; SUBCUTANEOUS at 08:35

## 2023-05-06 RX ADMIN — THERA TABS 1 TABLET: TAB at 08:38

## 2023-05-06 RX ADMIN — SODIUM CHLORIDE, PRESERVATIVE FREE 10 ML: 5 INJECTION INTRAVENOUS at 08:47

## 2023-05-06 RX ADMIN — INSULIN GLARGINE 14 UNITS: 100 INJECTION, SOLUTION SUBCUTANEOUS at 20:38

## 2023-05-06 RX ADMIN — METOPROLOL TARTRATE 12.5 MG: 25 TABLET, FILM COATED ORAL at 08:39

## 2023-05-06 RX ADMIN — ASPIRIN 81 MG 324 MG: 81 TABLET ORAL at 08:45

## 2023-05-06 RX ADMIN — Medication 6 MG: at 20:33

## 2023-05-06 RX ADMIN — Medication 1000 UNITS: at 08:48

## 2023-05-06 RX ADMIN — AMPICILLIN SODIUM 2000 MG: 2 INJECTION, POWDER, FOR SOLUTION INTRAMUSCULAR; INTRAVENOUS at 08:31

## 2023-05-06 RX ADMIN — OXYCODONE 5 MG: 5 TABLET ORAL at 16:22

## 2023-05-06 RX ADMIN — SODIUM CHLORIDE, PRESERVATIVE FREE 10 ML: 5 INJECTION INTRAVENOUS at 20:56

## 2023-05-06 RX ADMIN — MICONAZOLE NITRATE: 2 POWDER TOPICAL at 08:47

## 2023-05-06 RX ADMIN — AMPICILLIN SODIUM 2000 MG: 2 INJECTION, POWDER, FOR SOLUTION INTRAMUSCULAR; INTRAVENOUS at 12:18

## 2023-05-06 RX ADMIN — SENNOSIDES AND DOCUSATE SODIUM 1 TABLET: 50; 8.6 TABLET ORAL at 08:46

## 2023-05-06 RX ADMIN — AMPICILLIN SODIUM 2000 MG: 2 INJECTION, POWDER, FOR SOLUTION INTRAMUSCULAR; INTRAVENOUS at 16:12

## 2023-05-06 RX ADMIN — CEFTRIAXONE SODIUM 2000 MG: 2 INJECTION, POWDER, FOR SOLUTION INTRAMUSCULAR; INTRAVENOUS at 00:56

## 2023-05-06 RX ADMIN — GUAIFENESIN 600 MG: 600 TABLET ORAL at 08:39

## 2023-05-06 RX ADMIN — MICONAZOLE NITRATE: 2 POWDER TOPICAL at 20:55

## 2023-05-06 RX ADMIN — ACETAMINOPHEN 1000 MG: 500 TABLET ORAL at 20:37

## 2023-05-06 RX ADMIN — CEFTRIAXONE SODIUM 2000 MG: 2 INJECTION, POWDER, FOR SOLUTION INTRAMUSCULAR; INTRAVENOUS at 11:38

## 2023-05-06 RX ADMIN — ACETAMINOPHEN 1000 MG: 500 TABLET ORAL at 14:35

## 2023-05-06 RX ADMIN — INSULIN LISPRO 5 UNITS: 100 INJECTION, SOLUTION INTRAVENOUS; SUBCUTANEOUS at 16:24

## 2023-05-06 RX ADMIN — FLUOXETINE 60 MG: 20 CAPSULE ORAL at 08:45

## 2023-05-06 RX ADMIN — Medication 400 MG: at 08:46

## 2023-05-06 ASSESSMENT — PAIN DESCRIPTION - ORIENTATION
ORIENTATION: LEFT;MID
ORIENTATION: MID;LEFT

## 2023-05-06 ASSESSMENT — PAIN SCALES - GENERAL
PAINLEVEL_OUTOF10: 7
PAINLEVEL_OUTOF10: 6
PAINLEVEL_OUTOF10: 0
PAINLEVEL_OUTOF10: 6
PAINLEVEL_OUTOF10: 6
PAINLEVEL_OUTOF10: 7

## 2023-05-06 ASSESSMENT — PAIN DESCRIPTION - DESCRIPTORS
DESCRIPTORS: SORE;ACHING
DESCRIPTORS: ACHING;DISCOMFORT;SHARP
DESCRIPTORS: ACHING

## 2023-05-06 ASSESSMENT — PAIN DESCRIPTION - LOCATION
LOCATION: INCISION
LOCATION: CHEST;BACK;LEG
LOCATION: CHEST;INCISION
LOCATION: CHEST;INCISION

## 2023-05-06 ASSESSMENT — PAIN - FUNCTIONAL ASSESSMENT
PAIN_FUNCTIONAL_ASSESSMENT: ACTIVITIES ARE NOT PREVENTED
PAIN_FUNCTIONAL_ASSESSMENT: ACTIVITIES ARE NOT PREVENTED

## 2023-05-07 VITALS
HEART RATE: 64 BPM | BODY MASS INDEX: 41.31 KG/M2 | HEIGHT: 70 IN | DIASTOLIC BLOOD PRESSURE: 72 MMHG | WEIGHT: 288.58 LBS | TEMPERATURE: 98 F | SYSTOLIC BLOOD PRESSURE: 146 MMHG | RESPIRATION RATE: 16 BRPM | OXYGEN SATURATION: 96 %

## 2023-05-07 LAB
GLUCOSE BLD-MCNC: 129 MG/DL (ref 70–99)
GLUCOSE BLD-MCNC: 130 MG/DL (ref 70–99)
GLUCOSE BLD-MCNC: 133 MG/DL (ref 70–99)
GLUCOSE BLD-MCNC: 92 MG/DL (ref 70–99)
GLUCOSE BLD-MCNC: 95 MG/DL (ref 70–99)
PERFORMED ON: ABNORMAL
PERFORMED ON: NORMAL
PERFORMED ON: NORMAL

## 2023-05-07 PROCEDURE — 6370000000 HC RX 637 (ALT 250 FOR IP): Performed by: PHYSICAL MEDICINE & REHABILITATION

## 2023-05-07 PROCEDURE — 2580000003 HC RX 258: Performed by: PHYSICAL MEDICINE & REHABILITATION

## 2023-05-07 PROCEDURE — 6360000002 HC RX W HCPCS: Performed by: PHYSICAL MEDICINE & REHABILITATION

## 2023-05-07 PROCEDURE — 1280000000 HC REHAB R&B

## 2023-05-07 PROCEDURE — 94760 N-INVAS EAR/PLS OXIMETRY 1: CPT

## 2023-05-07 RX ADMIN — GUAIFENESIN 600 MG: 600 TABLET ORAL at 20:15

## 2023-05-07 RX ADMIN — AMPICILLIN SODIUM 2000 MG: 2 INJECTION, POWDER, FOR SOLUTION INTRAMUSCULAR; INTRAVENOUS at 16:48

## 2023-05-07 RX ADMIN — ACETAMINOPHEN 1000 MG: 500 TABLET ORAL at 14:14

## 2023-05-07 RX ADMIN — PANTOPRAZOLE SODIUM 40 MG: 40 TABLET, DELAYED RELEASE ORAL at 05:14

## 2023-05-07 RX ADMIN — ALLOPURINOL 600 MG: 300 TABLET ORAL at 07:36

## 2023-05-07 RX ADMIN — SENNOSIDES AND DOCUSATE SODIUM 1 TABLET: 50; 8.6 TABLET ORAL at 07:36

## 2023-05-07 RX ADMIN — AMPICILLIN SODIUM 2000 MG: 2 INJECTION, POWDER, FOR SOLUTION INTRAMUSCULAR; INTRAVENOUS at 12:23

## 2023-05-07 RX ADMIN — METOPROLOL TARTRATE 12.5 MG: 25 TABLET, FILM COATED ORAL at 07:35

## 2023-05-07 RX ADMIN — MICONAZOLE NITRATE: 2 POWDER TOPICAL at 20:33

## 2023-05-07 RX ADMIN — AMPICILLIN SODIUM 2000 MG: 2 INJECTION, POWDER, FOR SOLUTION INTRAMUSCULAR; INTRAVENOUS at 04:21

## 2023-05-07 RX ADMIN — SODIUM CHLORIDE, PRESERVATIVE FREE 10 ML: 5 INJECTION INTRAVENOUS at 07:40

## 2023-05-07 RX ADMIN — INSULIN LISPRO 5 UNITS: 100 INJECTION, SOLUTION INTRAVENOUS; SUBCUTANEOUS at 11:42

## 2023-05-07 RX ADMIN — SENNOSIDES AND DOCUSATE SODIUM 1 TABLET: 50; 8.6 TABLET ORAL at 20:16

## 2023-05-07 RX ADMIN — Medication 1000 UNITS: at 07:36

## 2023-05-07 RX ADMIN — INSULIN LISPRO 5 UNITS: 100 INJECTION, SOLUTION INTRAVENOUS; SUBCUTANEOUS at 07:36

## 2023-05-07 RX ADMIN — CEFTRIAXONE SODIUM 2000 MG: 2 INJECTION, POWDER, FOR SOLUTION INTRAMUSCULAR; INTRAVENOUS at 00:30

## 2023-05-07 RX ADMIN — AMPICILLIN SODIUM 2000 MG: 2 INJECTION, POWDER, FOR SOLUTION INTRAMUSCULAR; INTRAVENOUS at 07:44

## 2023-05-07 RX ADMIN — SODIUM CHLORIDE 25 ML: 9 INJECTION, SOLUTION INTRAVENOUS at 00:26

## 2023-05-07 RX ADMIN — CEFTRIAXONE SODIUM 2000 MG: 2 INJECTION, POWDER, FOR SOLUTION INTRAMUSCULAR; INTRAVENOUS at 23:53

## 2023-05-07 RX ADMIN — CEFTRIAXONE SODIUM 2000 MG: 2 INJECTION, POWDER, FOR SOLUTION INTRAMUSCULAR; INTRAVENOUS at 11:36

## 2023-05-07 RX ADMIN — ENOXAPARIN SODIUM 40 MG: 100 INJECTION SUBCUTANEOUS at 07:37

## 2023-05-07 RX ADMIN — SODIUM CHLORIDE 25 ML: 9 INJECTION, SOLUTION INTRAVENOUS at 23:51

## 2023-05-07 RX ADMIN — GUAIFENESIN 600 MG: 600 TABLET ORAL at 07:36

## 2023-05-07 RX ADMIN — OXYCODONE HYDROCHLORIDE 10 MG: 10 TABLET ORAL at 20:13

## 2023-05-07 RX ADMIN — ACETAMINOPHEN 1000 MG: 500 TABLET ORAL at 20:15

## 2023-05-07 RX ADMIN — SODIUM CHLORIDE, PRESERVATIVE FREE 10 ML: 5 INJECTION INTRAVENOUS at 20:16

## 2023-05-07 RX ADMIN — MICONAZOLE NITRATE: 2 POWDER TOPICAL at 07:52

## 2023-05-07 RX ADMIN — Medication 6 MG: at 20:15

## 2023-05-07 RX ADMIN — FLUOXETINE 60 MG: 20 CAPSULE ORAL at 07:36

## 2023-05-07 RX ADMIN — QUETIAPINE FUMARATE 50 MG: 25 TABLET ORAL at 20:13

## 2023-05-07 RX ADMIN — METOPROLOL TARTRATE 12.5 MG: 25 TABLET, FILM COATED ORAL at 20:13

## 2023-05-07 RX ADMIN — AMPICILLIN SODIUM 2000 MG: 2 INJECTION, POWDER, FOR SOLUTION INTRAMUSCULAR; INTRAVENOUS at 00:27

## 2023-05-07 RX ADMIN — THERA TABS 1 TABLET: TAB at 07:36

## 2023-05-07 RX ADMIN — INSULIN GLARGINE 14 UNITS: 100 INJECTION, SOLUTION SUBCUTANEOUS at 20:17

## 2023-05-07 RX ADMIN — AMPICILLIN SODIUM 2000 MG: 2 INJECTION, POWDER, FOR SOLUTION INTRAMUSCULAR; INTRAVENOUS at 20:23

## 2023-05-07 RX ADMIN — Medication 400 MG: at 07:36

## 2023-05-07 RX ADMIN — ACETAMINOPHEN 1000 MG: 500 TABLET ORAL at 05:14

## 2023-05-07 RX ADMIN — INSULIN LISPRO 5 UNITS: 100 INJECTION, SOLUTION INTRAVENOUS; SUBCUTANEOUS at 16:47

## 2023-05-07 RX ADMIN — SODIUM CHLORIDE 25 ML: 9 INJECTION, SOLUTION INTRAVENOUS at 04:20

## 2023-05-07 RX ADMIN — TORSEMIDE 20 MG: 20 TABLET ORAL at 07:36

## 2023-05-07 RX ADMIN — ATORVASTATIN CALCIUM 40 MG: 40 TABLET, FILM COATED ORAL at 20:15

## 2023-05-07 RX ADMIN — ASPIRIN 81 MG 324 MG: 81 TABLET ORAL at 07:35

## 2023-05-07 ASSESSMENT — PAIN SCALES - GENERAL
PAINLEVEL_OUTOF10: 6
PAINLEVEL_OUTOF10: 1
PAINLEVEL_OUTOF10: 0
PAINLEVEL_OUTOF10: 7
PAINLEVEL_OUTOF10: 7

## 2023-05-07 ASSESSMENT — PAIN DESCRIPTION - LOCATION
LOCATION: CHEST;INCISION
LOCATION: CHEST

## 2023-05-07 ASSESSMENT — PAIN DESCRIPTION - PAIN TYPE: TYPE: ACUTE PAIN

## 2023-05-07 ASSESSMENT — PAIN DESCRIPTION - FREQUENCY: FREQUENCY: CONTINUOUS

## 2023-05-07 ASSESSMENT — PAIN DESCRIPTION - ORIENTATION
ORIENTATION: MID;LEFT
ORIENTATION: MID;LEFT

## 2023-05-07 ASSESSMENT — PAIN DESCRIPTION - DESCRIPTORS
DESCRIPTORS: ACHING
DESCRIPTORS: ACHING

## 2023-05-07 ASSESSMENT — PAIN DESCRIPTION - ONSET: ONSET: ON-GOING

## 2023-05-08 LAB
ALBUMIN SERPL-MCNC: 2.9 G/DL (ref 3.4–5)
ALBUMIN/GLOB SERPL: 1 {RATIO} (ref 1.1–2.2)
ALP SERPL-CCNC: 76 U/L (ref 40–129)
ALT SERPL-CCNC: 7 U/L (ref 10–40)
ANION GAP SERPL CALCULATED.3IONS-SCNC: 9 MMOL/L (ref 3–16)
AST SERPL-CCNC: 14 U/L (ref 15–37)
BASOPHILS # BLD: 0 K/UL (ref 0–0.2)
BASOPHILS NFR BLD: 0.5 %
BILIRUB SERPL-MCNC: <0.2 MG/DL (ref 0–1)
BUN SERPL-MCNC: 15 MG/DL (ref 7–20)
CALCIUM SERPL-MCNC: 8 MG/DL (ref 8.3–10.6)
CHLORIDE SERPL-SCNC: 107 MMOL/L (ref 99–110)
CO2 SERPL-SCNC: 28 MMOL/L (ref 21–32)
CREAT SERPL-MCNC: 0.7 MG/DL (ref 0.8–1.3)
CRP SERPL-MCNC: 33.4 MG/L (ref 0–5.1)
DEPRECATED RDW RBC AUTO: 17.2 % (ref 12.4–15.4)
EKG ATRIAL RATE: 62 BPM
EKG DIAGNOSIS: NORMAL
EKG P AXIS: 69 DEGREES
EKG P-R INTERVAL: 224 MS
EKG Q-T INTERVAL: 472 MS
EKG QRS DURATION: 114 MS
EKG QTC CALCULATION (BAZETT): 479 MS
EKG R AXIS: -48 DEGREES
EKG T AXIS: 23 DEGREES
EKG VENTRICULAR RATE: 62 BPM
EOSINOPHIL # BLD: 0.3 K/UL (ref 0–0.6)
EOSINOPHIL NFR BLD: 4.9 %
ERYTHROCYTE [SEDIMENTATION RATE] IN BLOOD BY WESTERGREN METHOD: 42 MM/HR (ref 0–20)
GFR SERPLBLD CREATININE-BSD FMLA CKD-EPI: >60 ML/MIN/{1.73_M2}
GLUCOSE BLD-MCNC: 108 MG/DL (ref 70–99)
GLUCOSE BLD-MCNC: 112 MG/DL (ref 70–99)
GLUCOSE BLD-MCNC: 131 MG/DL (ref 70–99)
GLUCOSE BLD-MCNC: 183 MG/DL (ref 70–99)
GLUCOSE BLD-MCNC: 91 MG/DL (ref 70–99)
GLUCOSE SERPL-MCNC: 96 MG/DL (ref 70–99)
HCT VFR BLD AUTO: 23.2 % (ref 40.5–52.5)
HGB BLD-MCNC: 7.7 G/DL (ref 13.5–17.5)
LYMPHOCYTES # BLD: 1.7 K/UL (ref 1–5.1)
LYMPHOCYTES NFR BLD: 25.4 %
MAGNESIUM SERPL-MCNC: 1.6 MG/DL (ref 1.8–2.4)
MCH RBC QN AUTO: 30.6 PG (ref 26–34)
MCHC RBC AUTO-ENTMCNC: 33.1 G/DL (ref 31–36)
MCV RBC AUTO: 92.3 FL (ref 80–100)
MONOCYTES # BLD: 0.6 K/UL (ref 0–1.3)
MONOCYTES NFR BLD: 9.7 %
NEUTROPHILS # BLD: 3.9 K/UL (ref 1.7–7.7)
NEUTROPHILS NFR BLD: 59.5 %
PERFORMED ON: ABNORMAL
PERFORMED ON: NORMAL
PLATELET # BLD AUTO: 328 K/UL (ref 135–450)
PMV BLD AUTO: 8 FL (ref 5–10.5)
POTASSIUM SERPL-SCNC: 3.3 MMOL/L (ref 3.5–5.1)
PROT SERPL-MCNC: 5.8 G/DL (ref 6.4–8.2)
RBC # BLD AUTO: 2.51 M/UL (ref 4.2–5.9)
SODIUM SERPL-SCNC: 144 MMOL/L (ref 136–145)
TROPONIN, HIGH SENSITIVITY: 71 NG/L (ref 0–22)
TROPONIN, HIGH SENSITIVITY: 74 NG/L (ref 0–22)
WBC # BLD AUTO: 6.5 K/UL (ref 4–11)

## 2023-05-08 PROCEDURE — 85652 RBC SED RATE AUTOMATED: CPT

## 2023-05-08 PROCEDURE — 97129 THER IVNTJ 1ST 15 MIN: CPT

## 2023-05-08 PROCEDURE — 84484 ASSAY OF TROPONIN QUANT: CPT

## 2023-05-08 PROCEDURE — 93005 ELECTROCARDIOGRAM TRACING: CPT | Performed by: PHYSICAL MEDICINE & REHABILITATION

## 2023-05-08 PROCEDURE — 85025 COMPLETE CBC W/AUTO DIFF WBC: CPT

## 2023-05-08 PROCEDURE — 97110 THERAPEUTIC EXERCISES: CPT

## 2023-05-08 PROCEDURE — 6370000000 HC RX 637 (ALT 250 FOR IP): Performed by: PHYSICAL MEDICINE & REHABILITATION

## 2023-05-08 PROCEDURE — 97535 SELF CARE MNGMENT TRAINING: CPT

## 2023-05-08 PROCEDURE — 94760 N-INVAS EAR/PLS OXIMETRY 1: CPT

## 2023-05-08 PROCEDURE — 97530 THERAPEUTIC ACTIVITIES: CPT

## 2023-05-08 PROCEDURE — 83735 ASSAY OF MAGNESIUM: CPT

## 2023-05-08 PROCEDURE — 93010 ELECTROCARDIOGRAM REPORT: CPT | Performed by: INTERNAL MEDICINE

## 2023-05-08 PROCEDURE — 97130 THER IVNTJ EA ADDL 15 MIN: CPT

## 2023-05-08 PROCEDURE — 86140 C-REACTIVE PROTEIN: CPT

## 2023-05-08 PROCEDURE — 2580000003 HC RX 258: Performed by: PHYSICAL MEDICINE & REHABILITATION

## 2023-05-08 PROCEDURE — 1280000000 HC REHAB R&B

## 2023-05-08 PROCEDURE — 97116 GAIT TRAINING THERAPY: CPT

## 2023-05-08 PROCEDURE — 6360000002 HC RX W HCPCS: Performed by: PHYSICAL MEDICINE & REHABILITATION

## 2023-05-08 PROCEDURE — 80053 COMPREHEN METABOLIC PANEL: CPT

## 2023-05-08 RX ORDER — POTASSIUM CHLORIDE 20 MEQ/1
20 TABLET, EXTENDED RELEASE ORAL
Status: DISCONTINUED | OUTPATIENT
Start: 2023-05-09 | End: 2023-05-11

## 2023-05-08 RX ORDER — LANOLIN ALCOHOL/MO/W.PET/CERES
400 CREAM (GRAM) TOPICAL 2 TIMES DAILY
Status: DISCONTINUED | OUTPATIENT
Start: 2023-05-08 | End: 2023-05-11

## 2023-05-08 RX ORDER — POTASSIUM CHLORIDE 20 MEQ/1
40 TABLET, EXTENDED RELEASE ORAL ONCE
Status: COMPLETED | OUTPATIENT
Start: 2023-05-08 | End: 2023-05-08

## 2023-05-08 RX ADMIN — INSULIN GLARGINE 14 UNITS: 100 INJECTION, SOLUTION SUBCUTANEOUS at 21:08

## 2023-05-08 RX ADMIN — AMPICILLIN SODIUM 2000 MG: 2 INJECTION, POWDER, FOR SOLUTION INTRAMUSCULAR; INTRAVENOUS at 12:14

## 2023-05-08 RX ADMIN — SENNOSIDES AND DOCUSATE SODIUM 1 TABLET: 50; 8.6 TABLET ORAL at 22:00

## 2023-05-08 RX ADMIN — MICONAZOLE NITRATE: 2 POWDER TOPICAL at 08:16

## 2023-05-08 RX ADMIN — PANTOPRAZOLE SODIUM 40 MG: 40 TABLET, DELAYED RELEASE ORAL at 05:44

## 2023-05-08 RX ADMIN — ATORVASTATIN CALCIUM 40 MG: 40 TABLET, FILM COATED ORAL at 21:13

## 2023-05-08 RX ADMIN — AMPICILLIN SODIUM 2000 MG: 2 INJECTION, POWDER, FOR SOLUTION INTRAMUSCULAR; INTRAVENOUS at 09:27

## 2023-05-08 RX ADMIN — SODIUM CHLORIDE, PRESERVATIVE FREE 10 ML: 5 INJECTION INTRAVENOUS at 09:07

## 2023-05-08 RX ADMIN — OXYCODONE HYDROCHLORIDE 10 MG: 10 TABLET ORAL at 10:23

## 2023-05-08 RX ADMIN — SODIUM CHLORIDE, PRESERVATIVE FREE 10 ML: 5 INJECTION INTRAVENOUS at 22:00

## 2023-05-08 RX ADMIN — INSULIN LISPRO 5 UNITS: 100 INJECTION, SOLUTION INTRAVENOUS; SUBCUTANEOUS at 08:22

## 2023-05-08 RX ADMIN — QUETIAPINE FUMARATE 50 MG: 25 TABLET ORAL at 21:11

## 2023-05-08 RX ADMIN — SODIUM CHLORIDE 25 ML: 9 INJECTION, SOLUTION INTRAVENOUS at 20:09

## 2023-05-08 RX ADMIN — INSULIN LISPRO 5 UNITS: 100 INJECTION, SOLUTION INTRAVENOUS; SUBCUTANEOUS at 17:29

## 2023-05-08 RX ADMIN — AMPICILLIN SODIUM 2000 MG: 2 INJECTION, POWDER, FOR SOLUTION INTRAMUSCULAR; INTRAVENOUS at 04:43

## 2023-05-08 RX ADMIN — ENOXAPARIN SODIUM 40 MG: 100 INJECTION SUBCUTANEOUS at 08:08

## 2023-05-08 RX ADMIN — THERA TABS 1 TABLET: TAB at 08:09

## 2023-05-08 RX ADMIN — ASPIRIN 81 MG 324 MG: 81 TABLET ORAL at 08:09

## 2023-05-08 RX ADMIN — Medication 1000 UNITS: at 08:09

## 2023-05-08 RX ADMIN — FLUOXETINE 60 MG: 20 CAPSULE ORAL at 08:10

## 2023-05-08 RX ADMIN — GUAIFENESIN 600 MG: 600 TABLET ORAL at 08:09

## 2023-05-08 RX ADMIN — GUAIFENESIN 600 MG: 600 TABLET ORAL at 21:09

## 2023-05-08 RX ADMIN — CEFTRIAXONE SODIUM 2000 MG: 2 INJECTION, POWDER, FOR SOLUTION INTRAMUSCULAR; INTRAVENOUS at 12:35

## 2023-05-08 RX ADMIN — POTASSIUM CHLORIDE 40 MEQ: 1500 TABLET, EXTENDED RELEASE ORAL at 11:05

## 2023-05-08 RX ADMIN — ACETAMINOPHEN 1000 MG: 500 TABLET ORAL at 12:41

## 2023-05-08 RX ADMIN — AMPICILLIN SODIUM 2000 MG: 2 INJECTION, POWDER, FOR SOLUTION INTRAMUSCULAR; INTRAVENOUS at 16:30

## 2023-05-08 RX ADMIN — ACETAMINOPHEN 1000 MG: 500 TABLET ORAL at 21:10

## 2023-05-08 RX ADMIN — TORSEMIDE 20 MG: 20 TABLET ORAL at 08:10

## 2023-05-08 RX ADMIN — SENNOSIDES AND DOCUSATE SODIUM 1 TABLET: 50; 8.6 TABLET ORAL at 08:10

## 2023-05-08 RX ADMIN — INSULIN LISPRO 5 UNITS: 100 INJECTION, SOLUTION INTRAVENOUS; SUBCUTANEOUS at 11:37

## 2023-05-08 RX ADMIN — Medication 6 MG: at 21:11

## 2023-05-08 RX ADMIN — METOPROLOL TARTRATE 12.5 MG: 25 TABLET, FILM COATED ORAL at 08:09

## 2023-05-08 RX ADMIN — AMPICILLIN SODIUM 2000 MG: 2 INJECTION, POWDER, FOR SOLUTION INTRAMUSCULAR; INTRAVENOUS at 20:10

## 2023-05-08 RX ADMIN — Medication 400 MG: at 08:10

## 2023-05-08 RX ADMIN — ACETAMINOPHEN 1000 MG: 500 TABLET ORAL at 05:44

## 2023-05-08 RX ADMIN — AMPICILLIN SODIUM 2000 MG: 2 INJECTION, POWDER, FOR SOLUTION INTRAMUSCULAR; INTRAVENOUS at 00:35

## 2023-05-08 RX ADMIN — Medication 400 MG: at 21:09

## 2023-05-08 RX ADMIN — METOPROLOL TARTRATE 12.5 MG: 25 TABLET, FILM COATED ORAL at 22:00

## 2023-05-08 RX ADMIN — ALLOPURINOL 600 MG: 300 TABLET ORAL at 08:10

## 2023-05-08 ASSESSMENT — PAIN SCALES - GENERAL
PAINLEVEL_OUTOF10: 0
PAINLEVEL_OUTOF10: 7
PAINLEVEL_OUTOF10: 7
PAINLEVEL_OUTOF10: 0
PAINLEVEL_OUTOF10: 7
PAINLEVEL_OUTOF10: 0
PAINLEVEL_OUTOF10: 6
PAINLEVEL_OUTOF10: 2
PAINLEVEL_OUTOF10: 0

## 2023-05-08 ASSESSMENT — PAIN DESCRIPTION - LOCATION
LOCATION: CHEST;BACK;ARM;LEG
LOCATION: BACK;CHEST;ARM;LEG

## 2023-05-08 ASSESSMENT — PAIN DESCRIPTION - ONSET
ONSET: ON-GOING
ONSET: ON-GOING

## 2023-05-08 ASSESSMENT — PAIN DESCRIPTION - FREQUENCY
FREQUENCY: CONTINUOUS
FREQUENCY: CONTINUOUS

## 2023-05-08 ASSESSMENT — PAIN DESCRIPTION - PAIN TYPE
TYPE: ACUTE PAIN
TYPE: ACUTE PAIN

## 2023-05-08 ASSESSMENT — PAIN DESCRIPTION - DESCRIPTORS: DESCRIPTORS: ACHING;CRUSHING

## 2023-05-08 ASSESSMENT — PAIN - FUNCTIONAL ASSESSMENT: PAIN_FUNCTIONAL_ASSESSMENT: ACTIVITIES ARE NOT PREVENTED

## 2023-05-08 NOTE — CARE COORDINATION
Called to [facility] transport to confirm transport for tomorrow's appt for pacemaker check. They will pick him up around 10:15 - 10:30 am.  Informed bedside RN,Chandrika.   [place], [place]     Case Management   [phone]    5/8/2023  9:43 AM

## 2023-05-09 LAB
GLUCOSE BLD-MCNC: 112 MG/DL (ref 70–99)
GLUCOSE BLD-MCNC: 118 MG/DL (ref 70–99)
GLUCOSE BLD-MCNC: 189 MG/DL (ref 70–99)
GLUCOSE BLD-MCNC: 75 MG/DL (ref 70–99)
PERFORMED ON: ABNORMAL
PERFORMED ON: NORMAL

## 2023-05-09 PROCEDURE — 1280000000 HC REHAB R&B

## 2023-05-09 PROCEDURE — 6360000002 HC RX W HCPCS: Performed by: PHYSICAL MEDICINE & REHABILITATION

## 2023-05-09 PROCEDURE — 97530 THERAPEUTIC ACTIVITIES: CPT

## 2023-05-09 PROCEDURE — 97110 THERAPEUTIC EXERCISES: CPT

## 2023-05-09 PROCEDURE — 6370000000 HC RX 637 (ALT 250 FOR IP): Performed by: PHYSICAL MEDICINE & REHABILITATION

## 2023-05-09 PROCEDURE — 94760 N-INVAS EAR/PLS OXIMETRY 1: CPT

## 2023-05-09 PROCEDURE — 97116 GAIT TRAINING THERAPY: CPT

## 2023-05-09 PROCEDURE — 2580000003 HC RX 258: Performed by: PHYSICAL MEDICINE & REHABILITATION

## 2023-05-09 PROCEDURE — 97535 SELF CARE MNGMENT TRAINING: CPT

## 2023-05-09 RX ORDER — INSULIN LISPRO 100 [IU]/ML
5 INJECTION, SOLUTION INTRAVENOUS; SUBCUTANEOUS
Status: DISCONTINUED | OUTPATIENT
Start: 2023-05-10 | End: 2023-05-17 | Stop reason: HOSPADM

## 2023-05-09 RX ADMIN — INSULIN GLARGINE 14 UNITS: 100 INJECTION, SOLUTION SUBCUTANEOUS at 20:01

## 2023-05-09 RX ADMIN — SODIUM CHLORIDE, PRESERVATIVE FREE 10 ML: 5 INJECTION INTRAVENOUS at 09:22

## 2023-05-09 RX ADMIN — ENOXAPARIN SODIUM 40 MG: 100 INJECTION SUBCUTANEOUS at 09:04

## 2023-05-09 RX ADMIN — CEFTRIAXONE SODIUM 2000 MG: 2 INJECTION, POWDER, FOR SOLUTION INTRAMUSCULAR; INTRAVENOUS at 23:32

## 2023-05-09 RX ADMIN — Medication 1000 UNITS: at 09:07

## 2023-05-09 RX ADMIN — SODIUM CHLORIDE 25 ML: 9 INJECTION, SOLUTION INTRAVENOUS at 16:11

## 2023-05-09 RX ADMIN — Medication 400 MG: at 09:07

## 2023-05-09 RX ADMIN — INSULIN LISPRO 5 UNITS: 100 INJECTION, SOLUTION INTRAVENOUS; SUBCUTANEOUS at 09:19

## 2023-05-09 RX ADMIN — METOPROLOL TARTRATE 12.5 MG: 25 TABLET, FILM COATED ORAL at 09:07

## 2023-05-09 RX ADMIN — CEFTRIAXONE SODIUM 2000 MG: 2 INJECTION, POWDER, FOR SOLUTION INTRAMUSCULAR; INTRAVENOUS at 00:24

## 2023-05-09 RX ADMIN — SODIUM CHLORIDE 25 ML: 9 INJECTION, SOLUTION INTRAVENOUS at 12:54

## 2023-05-09 RX ADMIN — SODIUM CHLORIDE 25 ML: 9 INJECTION, SOLUTION INTRAVENOUS at 00:21

## 2023-05-09 RX ADMIN — MICONAZOLE NITRATE: 2 POWDER TOPICAL at 13:21

## 2023-05-09 RX ADMIN — GUAIFENESIN 600 MG: 600 TABLET ORAL at 09:04

## 2023-05-09 RX ADMIN — AMPICILLIN SODIUM 2000 MG: 2 INJECTION, POWDER, FOR SOLUTION INTRAMUSCULAR; INTRAVENOUS at 04:35

## 2023-05-09 RX ADMIN — INSULIN LISPRO 5 UNITS: 100 INJECTION, SOLUTION INTRAVENOUS; SUBCUTANEOUS at 13:33

## 2023-05-09 RX ADMIN — FLUOXETINE 60 MG: 20 CAPSULE ORAL at 09:06

## 2023-05-09 RX ADMIN — ALLOPURINOL 600 MG: 300 TABLET ORAL at 09:05

## 2023-05-09 RX ADMIN — METOPROLOL TARTRATE 12.5 MG: 25 TABLET, FILM COATED ORAL at 19:50

## 2023-05-09 RX ADMIN — MICONAZOLE NITRATE: 2 POWDER TOPICAL at 00:29

## 2023-05-09 RX ADMIN — THERA TABS 1 TABLET: TAB at 09:04

## 2023-05-09 RX ADMIN — CEFTRIAXONE SODIUM 2000 MG: 2 INJECTION, POWDER, FOR SOLUTION INTRAMUSCULAR; INTRAVENOUS at 13:08

## 2023-05-09 RX ADMIN — ACETAMINOPHEN 1000 MG: 500 TABLET ORAL at 12:47

## 2023-05-09 RX ADMIN — TORSEMIDE 20 MG: 20 TABLET ORAL at 09:07

## 2023-05-09 RX ADMIN — ASPIRIN 81 MG 324 MG: 81 TABLET ORAL at 09:07

## 2023-05-09 RX ADMIN — AMPICILLIN SODIUM 2000 MG: 2 INJECTION, POWDER, FOR SOLUTION INTRAMUSCULAR; INTRAVENOUS at 09:17

## 2023-05-09 RX ADMIN — GUAIFENESIN 600 MG: 600 TABLET ORAL at 19:50

## 2023-05-09 RX ADMIN — SODIUM CHLORIDE 25 ML: 9 INJECTION, SOLUTION INTRAVENOUS at 13:07

## 2023-05-09 RX ADMIN — AMPICILLIN SODIUM 2000 MG: 2 INJECTION, POWDER, FOR SOLUTION INTRAMUSCULAR; INTRAVENOUS at 23:34

## 2023-05-09 RX ADMIN — AMPICILLIN SODIUM 2000 MG: 2 INJECTION, POWDER, FOR SOLUTION INTRAMUSCULAR; INTRAVENOUS at 13:00

## 2023-05-09 RX ADMIN — ACETAMINOPHEN 1000 MG: 500 TABLET ORAL at 23:30

## 2023-05-09 RX ADMIN — MICONAZOLE NITRATE: 2 POWDER TOPICAL at 19:52

## 2023-05-09 RX ADMIN — ACETAMINOPHEN 1000 MG: 500 TABLET ORAL at 05:59

## 2023-05-09 RX ADMIN — PANTOPRAZOLE SODIUM 40 MG: 40 TABLET, DELAYED RELEASE ORAL at 06:01

## 2023-05-09 RX ADMIN — POTASSIUM CHLORIDE 20 MEQ: 1500 TABLET, EXTENDED RELEASE ORAL at 09:06

## 2023-05-09 RX ADMIN — QUETIAPINE FUMARATE 50 MG: 25 TABLET ORAL at 19:50

## 2023-05-09 RX ADMIN — SENNOSIDES AND DOCUSATE SODIUM 1 TABLET: 50; 8.6 TABLET ORAL at 19:50

## 2023-05-09 RX ADMIN — ATORVASTATIN CALCIUM 40 MG: 40 TABLET, FILM COATED ORAL at 19:50

## 2023-05-09 RX ADMIN — SODIUM CHLORIDE 25 ML: 9 INJECTION, SOLUTION INTRAVENOUS at 09:15

## 2023-05-09 RX ADMIN — AMPICILLIN SODIUM 2000 MG: 2 INJECTION, POWDER, FOR SOLUTION INTRAMUSCULAR; INTRAVENOUS at 16:15

## 2023-05-09 RX ADMIN — SENNOSIDES AND DOCUSATE SODIUM 1 TABLET: 50; 8.6 TABLET ORAL at 09:06

## 2023-05-09 RX ADMIN — OXYCODONE HYDROCHLORIDE 10 MG: 10 TABLET ORAL at 15:31

## 2023-05-09 RX ADMIN — Medication 400 MG: at 19:50

## 2023-05-09 RX ADMIN — Medication 6 MG: at 19:50

## 2023-05-09 RX ADMIN — AMPICILLIN SODIUM 2000 MG: 2 INJECTION, POWDER, FOR SOLUTION INTRAMUSCULAR; INTRAVENOUS at 01:11

## 2023-05-09 RX ADMIN — AMPICILLIN SODIUM 2000 MG: 2 INJECTION, POWDER, FOR SOLUTION INTRAMUSCULAR; INTRAVENOUS at 20:00

## 2023-05-09 RX ADMIN — SODIUM CHLORIDE, PRESERVATIVE FREE 10 ML: 5 INJECTION INTRAVENOUS at 19:52

## 2023-05-09 RX ADMIN — OXYCODONE HYDROCHLORIDE 10 MG: 10 TABLET ORAL at 09:02

## 2023-05-09 ASSESSMENT — PAIN SCALES - GENERAL
PAINLEVEL_OUTOF10: 7
PAINLEVEL_OUTOF10: 0
PAINLEVEL_OUTOF10: 7
PAINLEVEL_OUTOF10: 7
PAINLEVEL_OUTOF10: 0

## 2023-05-09 ASSESSMENT — PAIN DESCRIPTION - LOCATION
LOCATION: GENERALIZED

## 2023-05-09 ASSESSMENT — PAIN DESCRIPTION - DESCRIPTORS
DESCRIPTORS: PATIENT UNABLE TO DESCRIBE
DESCRIPTORS: ACHING;THROBBING

## 2023-05-09 ASSESSMENT — PAIN DESCRIPTION - ONSET
ONSET: GRADUAL

## 2023-05-09 ASSESSMENT — PAIN - FUNCTIONAL ASSESSMENT
PAIN_FUNCTIONAL_ASSESSMENT: PREVENTS OR INTERFERES SOME ACTIVE ACTIVITIES AND ADLS
PAIN_FUNCTIONAL_ASSESSMENT: PREVENTS OR INTERFERES SOME ACTIVE ACTIVITIES AND ADLS
PAIN_FUNCTIONAL_ASSESSMENT: ACTIVITIES ARE NOT PREVENTED
PAIN_FUNCTIONAL_ASSESSMENT: PREVENTS OR INTERFERES SOME ACTIVE ACTIVITIES AND ADLS

## 2023-05-09 ASSESSMENT — PAIN DESCRIPTION - PAIN TYPE
TYPE: ACUTE PAIN
TYPE: ACUTE PAIN
TYPE: SURGICAL PAIN

## 2023-05-09 ASSESSMENT — PAIN DESCRIPTION - FREQUENCY
FREQUENCY: INTERMITTENT
FREQUENCY: INTERMITTENT

## 2023-05-09 NOTE — CARE COORDINATION
Call rec'd from wife who states she found out that an appointment has been made to see Dr Anna Martinez on Monday 5- at 8:30 am.  Call placed to MD office to confirm. Spoke with Dr Khadijah Soto who would like to postpone this appt to the following week.   Called back to office, spoke with Phyllis Coronel who rearranged appt to Monday 5- at 10:30 am.  Placed this visit on AVS.  Eric Baca [place]     Case Management   [phone]    5/9/2023  2:58 PM

## 2023-05-10 LAB
GLUCOSE BLD-MCNC: 101 MG/DL (ref 70–99)
GLUCOSE BLD-MCNC: 115 MG/DL (ref 70–99)
GLUCOSE BLD-MCNC: 132 MG/DL (ref 70–99)
GLUCOSE BLD-MCNC: 91 MG/DL (ref 70–99)
PERFORMED ON: ABNORMAL
PERFORMED ON: NORMAL

## 2023-05-10 PROCEDURE — 97110 THERAPEUTIC EXERCISES: CPT

## 2023-05-10 PROCEDURE — 97116 GAIT TRAINING THERAPY: CPT

## 2023-05-10 PROCEDURE — 97129 THER IVNTJ 1ST 15 MIN: CPT

## 2023-05-10 PROCEDURE — 1280000000 HC REHAB R&B

## 2023-05-10 PROCEDURE — 6370000000 HC RX 637 (ALT 250 FOR IP): Performed by: PHYSICAL MEDICINE & REHABILITATION

## 2023-05-10 PROCEDURE — 97535 SELF CARE MNGMENT TRAINING: CPT

## 2023-05-10 PROCEDURE — 97530 THERAPEUTIC ACTIVITIES: CPT

## 2023-05-10 PROCEDURE — 97130 THER IVNTJ EA ADDL 15 MIN: CPT

## 2023-05-10 PROCEDURE — 2580000003 HC RX 258: Performed by: PHYSICAL MEDICINE & REHABILITATION

## 2023-05-10 PROCEDURE — 94760 N-INVAS EAR/PLS OXIMETRY 1: CPT

## 2023-05-10 PROCEDURE — 6360000002 HC RX W HCPCS: Performed by: PHYSICAL MEDICINE & REHABILITATION

## 2023-05-10 RX ORDER — TORSEMIDE 20 MG/1
40 TABLET ORAL DAILY
Status: DISCONTINUED | OUTPATIENT
Start: 2023-05-11 | End: 2023-05-17 | Stop reason: HOSPADM

## 2023-05-10 RX ADMIN — INSULIN LISPRO 5 UNITS: 100 INJECTION, SOLUTION INTRAVENOUS; SUBCUTANEOUS at 08:00

## 2023-05-10 RX ADMIN — AMPICILLIN SODIUM 2000 MG: 2 INJECTION, POWDER, FOR SOLUTION INTRAMUSCULAR; INTRAVENOUS at 20:18

## 2023-05-10 RX ADMIN — Medication 400 MG: at 20:08

## 2023-05-10 RX ADMIN — PANTOPRAZOLE SODIUM 40 MG: 40 TABLET, DELAYED RELEASE ORAL at 05:01

## 2023-05-10 RX ADMIN — SODIUM CHLORIDE 25 ML: 9 INJECTION, SOLUTION INTRAVENOUS at 12:44

## 2023-05-10 RX ADMIN — ATORVASTATIN CALCIUM 40 MG: 40 TABLET, FILM COATED ORAL at 20:07

## 2023-05-10 RX ADMIN — THERA TABS 1 TABLET: TAB at 08:02

## 2023-05-10 RX ADMIN — SODIUM CHLORIDE, PRESERVATIVE FREE 10 ML: 5 INJECTION INTRAVENOUS at 20:09

## 2023-05-10 RX ADMIN — AMPICILLIN SODIUM 2000 MG: 2 INJECTION, POWDER, FOR SOLUTION INTRAMUSCULAR; INTRAVENOUS at 03:54

## 2023-05-10 RX ADMIN — POTASSIUM CHLORIDE 20 MEQ: 1500 TABLET, EXTENDED RELEASE ORAL at 08:03

## 2023-05-10 RX ADMIN — ACETAMINOPHEN 1000 MG: 500 TABLET ORAL at 20:19

## 2023-05-10 RX ADMIN — CEFTRIAXONE SODIUM 2000 MG: 2 INJECTION, POWDER, FOR SOLUTION INTRAMUSCULAR; INTRAVENOUS at 12:57

## 2023-05-10 RX ADMIN — INSULIN LISPRO 5 UNITS: 100 INJECTION, SOLUTION INTRAVENOUS; SUBCUTANEOUS at 12:46

## 2023-05-10 RX ADMIN — METOPROLOL TARTRATE 12.5 MG: 25 TABLET, FILM COATED ORAL at 08:03

## 2023-05-10 RX ADMIN — ACETAMINOPHEN 1000 MG: 500 TABLET ORAL at 13:04

## 2023-05-10 RX ADMIN — METOPROLOL TARTRATE 12.5 MG: 25 TABLET, FILM COATED ORAL at 20:08

## 2023-05-10 RX ADMIN — ALLOPURINOL 600 MG: 300 TABLET ORAL at 08:02

## 2023-05-10 RX ADMIN — OXYCODONE HYDROCHLORIDE 10 MG: 10 TABLET ORAL at 20:08

## 2023-05-10 RX ADMIN — Medication 400 MG: at 08:04

## 2023-05-10 RX ADMIN — INSULIN GLARGINE 14 UNITS: 100 INJECTION, SOLUTION SUBCUTANEOUS at 20:08

## 2023-05-10 RX ADMIN — FLUOXETINE 60 MG: 20 CAPSULE ORAL at 08:01

## 2023-05-10 RX ADMIN — SENNOSIDES AND DOCUSATE SODIUM 1 TABLET: 50; 8.6 TABLET ORAL at 08:00

## 2023-05-10 RX ADMIN — ENOXAPARIN SODIUM 40 MG: 100 INJECTION SUBCUTANEOUS at 08:00

## 2023-05-10 RX ADMIN — SODIUM CHLORIDE, PRESERVATIVE FREE 10 ML: 5 INJECTION INTRAVENOUS at 08:04

## 2023-05-10 RX ADMIN — SODIUM CHLORIDE 25 ML: 9 INJECTION, SOLUTION INTRAVENOUS at 16:40

## 2023-05-10 RX ADMIN — GUAIFENESIN 600 MG: 600 TABLET ORAL at 20:08

## 2023-05-10 RX ADMIN — SODIUM CHLORIDE 25 ML: 9 INJECTION, SOLUTION INTRAVENOUS at 08:08

## 2023-05-10 RX ADMIN — ASPIRIN 81 MG 324 MG: 81 TABLET ORAL at 08:03

## 2023-05-10 RX ADMIN — SODIUM CHLORIDE 25 ML: 9 INJECTION, SOLUTION INTRAVENOUS at 12:56

## 2023-05-10 RX ADMIN — QUETIAPINE FUMARATE 50 MG: 25 TABLET ORAL at 20:08

## 2023-05-10 RX ADMIN — TORSEMIDE 20 MG: 20 TABLET ORAL at 08:03

## 2023-05-10 RX ADMIN — AMPICILLIN SODIUM 2000 MG: 2 INJECTION, POWDER, FOR SOLUTION INTRAMUSCULAR; INTRAVENOUS at 08:09

## 2023-05-10 RX ADMIN — AMPICILLIN SODIUM 2000 MG: 2 INJECTION, POWDER, FOR SOLUTION INTRAMUSCULAR; INTRAVENOUS at 13:01

## 2023-05-10 RX ADMIN — Medication 6 MG: at 20:07

## 2023-05-10 RX ADMIN — MICONAZOLE NITRATE: 2 POWDER TOPICAL at 20:10

## 2023-05-10 RX ADMIN — ACETAMINOPHEN 1000 MG: 500 TABLET ORAL at 04:57

## 2023-05-10 RX ADMIN — MICONAZOLE NITRATE: 2 POWDER TOPICAL at 09:18

## 2023-05-10 RX ADMIN — GUAIFENESIN 600 MG: 600 TABLET ORAL at 08:04

## 2023-05-10 RX ADMIN — AMPICILLIN SODIUM 2000 MG: 2 INJECTION, POWDER, FOR SOLUTION INTRAMUSCULAR; INTRAVENOUS at 16:41

## 2023-05-10 RX ADMIN — Medication 1000 UNITS: at 08:03

## 2023-05-10 ASSESSMENT — PAIN SCALES - GENERAL
PAINLEVEL_OUTOF10: 0
PAINLEVEL_OUTOF10: 7
PAINLEVEL_OUTOF10: 0
PAINLEVEL_OUTOF10: 7
PAINLEVEL_OUTOF10: 0

## 2023-05-10 ASSESSMENT — PAIN DESCRIPTION - ONSET
ONSET: GRADUAL
ONSET: GRADUAL

## 2023-05-10 ASSESSMENT — PAIN DESCRIPTION - PAIN TYPE
TYPE: SURGICAL PAIN
TYPE: SURGICAL PAIN

## 2023-05-10 ASSESSMENT — PAIN SCALES - WONG BAKER: WONGBAKER_NUMERICALRESPONSE: 0

## 2023-05-10 ASSESSMENT — PAIN DESCRIPTION - FREQUENCY
FREQUENCY: INTERMITTENT
FREQUENCY: INTERMITTENT

## 2023-05-10 ASSESSMENT — PAIN DESCRIPTION - ORIENTATION: ORIENTATION: MID

## 2023-05-10 ASSESSMENT — PAIN DESCRIPTION - LOCATION
LOCATION: GENERALIZED
LOCATION: GENERALIZED

## 2023-05-10 ASSESSMENT — PAIN DESCRIPTION - DESCRIPTORS
DESCRIPTORS: PATIENT UNABLE TO DESCRIBE
DESCRIPTORS: ACHING

## 2023-05-10 NOTE — PATIENT CARE CONFERENCE
shoulder rotator cuff injury, limited shoulder flex/ext, horizontal abd/add, limite to 90 deg due to PPM precautions      Assessment:  Assessment: Pt more fatigued today, stated he felt like he was going to pass out, but meant that he was so tired he could pass out. He did have pain med per nursing, which could have had an affect on his session this AM.  Pt completed toileting with max assist, bathed UB with cues, LB with mod assist.  Transferred wiht mod assist, amb short distance in room with HDRW and CGA, cues. He requires cues due to decreased memory, problem solving, insight. Barriers include decreased activity tolerance, sternal precautions, PPM precautions, [place], decreased recall of precautions. Do not anticipate pt will require further DME at discharge. Anticipate he will require home OT, another 10 days inpt rehab with goal to discharge home with spouse  Activity Tolerance: Patient limited by endurance, Patient tolerated treatment well  Discharge Recommendations: Home with assist PRN, Home with Home health OT, Continue to assess pending progress, Patient would benefit from continued therapy after discharge      NUTRITION  Most recent weightWeight - Scale: 285 lb 7.9 oz (129.5 kg)  BSA (Calculated - sq m): 2.52 sq meters  BMI (Calculated): 41.1  Please see nutrition note for details. NURSING  Pt is incontinent at times, incision care, monitor and maintain skin integrity, fall risk.   Family Education: Patient Education: medications, incision care, diet, sternal precautions, safety and fall prevention, skin care and prevention,    MEDICAL      TEAM SUMMARY AND DISCHARGE PLAN  Estimated Length of Stay:5/17/20236  Destination: home health  Anticipated Services at Discharge:    [x] OT  [x] PT   [] SLP    [x] RN   [] Home Health aide []   Community Resources: _______________________________  Equipment recommendations:  [] Hospital bed [] Tub bench  [] Shower chair [] Hand held shower  [] Raised toilet
medical record of Franki Lira. MD: Jennifer Tarango.  Salo Kelly MD 5/11/2023, 9:57 PM

## 2023-05-11 LAB
ALBUMIN SERPL-MCNC: 3 G/DL (ref 3.4–5)
ALBUMIN/GLOB SERPL: 1.1 {RATIO} (ref 1.1–2.2)
ALP SERPL-CCNC: 74 U/L (ref 40–129)
ALT SERPL-CCNC: 8 U/L (ref 10–40)
ANION GAP SERPL CALCULATED.3IONS-SCNC: 10 MMOL/L (ref 3–16)
AST SERPL-CCNC: 14 U/L (ref 15–37)
BASOPHILS # BLD: 0 K/UL (ref 0–0.2)
BASOPHILS NFR BLD: 0.4 %
BILIRUB SERPL-MCNC: <0.2 MG/DL (ref 0–1)
BUN SERPL-MCNC: 15 MG/DL (ref 7–20)
CALCIUM SERPL-MCNC: 8.1 MG/DL (ref 8.3–10.6)
CHLORIDE SERPL-SCNC: 107 MMOL/L (ref 99–110)
CO2 SERPL-SCNC: 29 MMOL/L (ref 21–32)
CREAT SERPL-MCNC: 0.8 MG/DL (ref 0.8–1.3)
DEPRECATED RDW RBC AUTO: 17 % (ref 12.4–15.4)
EOSINOPHIL # BLD: 0.4 K/UL (ref 0–0.6)
EOSINOPHIL NFR BLD: 6.1 %
GFR SERPLBLD CREATININE-BSD FMLA CKD-EPI: >60 ML/MIN/{1.73_M2}
GLUCOSE BLD-MCNC: 107 MG/DL (ref 70–99)
GLUCOSE BLD-MCNC: 210 MG/DL (ref 70–99)
GLUCOSE BLD-MCNC: 237 MG/DL (ref 70–99)
GLUCOSE BLD-MCNC: 53 MG/DL (ref 70–99)
GLUCOSE BLD-MCNC: 65 MG/DL (ref 70–99)
GLUCOSE BLD-MCNC: 66 MG/DL (ref 70–99)
GLUCOSE BLD-MCNC: 82 MG/DL (ref 70–99)
GLUCOSE BLD-MCNC: 95 MG/DL (ref 70–99)
GLUCOSE SERPL-MCNC: 94 MG/DL (ref 70–99)
HCT VFR BLD AUTO: 24.2 % (ref 40.5–52.5)
HGB BLD-MCNC: 7.9 G/DL (ref 13.5–17.5)
LYMPHOCYTES # BLD: 1.5 K/UL (ref 1–5.1)
LYMPHOCYTES NFR BLD: 22.6 %
MAGNESIUM SERPL-MCNC: 1.5 MG/DL (ref 1.8–2.4)
MCH RBC QN AUTO: 30.1 PG (ref 26–34)
MCHC RBC AUTO-ENTMCNC: 32.7 G/DL (ref 31–36)
MCV RBC AUTO: 92 FL (ref 80–100)
MONOCYTES # BLD: 0.7 K/UL (ref 0–1.3)
MONOCYTES NFR BLD: 10.7 %
NEUTROPHILS # BLD: 3.9 K/UL (ref 1.7–7.7)
NEUTROPHILS NFR BLD: 60.2 %
PERFORMED ON: ABNORMAL
PERFORMED ON: NORMAL
PERFORMED ON: NORMAL
PLATELET # BLD AUTO: 298 K/UL (ref 135–450)
PMV BLD AUTO: 8.1 FL (ref 5–10.5)
POTASSIUM SERPL-SCNC: 3.4 MMOL/L (ref 3.5–5.1)
PROT SERPL-MCNC: 5.8 G/DL (ref 6.4–8.2)
RBC # BLD AUTO: 2.63 M/UL (ref 4.2–5.9)
SODIUM SERPL-SCNC: 146 MMOL/L (ref 136–145)
WBC # BLD AUTO: 6.5 K/UL (ref 4–11)

## 2023-05-11 PROCEDURE — 97130 THER IVNTJ EA ADDL 15 MIN: CPT

## 2023-05-11 PROCEDURE — 97530 THERAPEUTIC ACTIVITIES: CPT

## 2023-05-11 PROCEDURE — 6360000002 HC RX W HCPCS: Performed by: PHYSICAL MEDICINE & REHABILITATION

## 2023-05-11 PROCEDURE — 97530 THERAPEUTIC ACTIVITIES: CPT | Performed by: PHYSICAL THERAPIST

## 2023-05-11 PROCEDURE — 97116 GAIT TRAINING THERAPY: CPT | Performed by: PHYSICAL THERAPIST

## 2023-05-11 PROCEDURE — 80053 COMPREHEN METABOLIC PANEL: CPT

## 2023-05-11 PROCEDURE — 97110 THERAPEUTIC EXERCISES: CPT

## 2023-05-11 PROCEDURE — 1280000000 HC REHAB R&B

## 2023-05-11 PROCEDURE — 85025 COMPLETE CBC W/AUTO DIFF WBC: CPT

## 2023-05-11 PROCEDURE — 97129 THER IVNTJ 1ST 15 MIN: CPT

## 2023-05-11 PROCEDURE — 2580000003 HC RX 258: Performed by: PHYSICAL MEDICINE & REHABILITATION

## 2023-05-11 PROCEDURE — 6370000000 HC RX 637 (ALT 250 FOR IP): Performed by: PHYSICAL MEDICINE & REHABILITATION

## 2023-05-11 PROCEDURE — 83735 ASSAY OF MAGNESIUM: CPT

## 2023-05-11 RX ORDER — LANOLIN ALCOHOL/MO/W.PET/CERES
400 CREAM (GRAM) TOPICAL 3 TIMES DAILY
Status: DISCONTINUED | OUTPATIENT
Start: 2023-05-11 | End: 2023-05-17 | Stop reason: HOSPADM

## 2023-05-11 RX ORDER — POTASSIUM CHLORIDE 20 MEQ/1
40 TABLET, EXTENDED RELEASE ORAL
Status: DISCONTINUED | OUTPATIENT
Start: 2023-05-12 | End: 2023-05-17 | Stop reason: HOSPADM

## 2023-05-11 RX ADMIN — AMPICILLIN SODIUM 2000 MG: 2 INJECTION, POWDER, FOR SOLUTION INTRAMUSCULAR; INTRAVENOUS at 04:09

## 2023-05-11 RX ADMIN — ATORVASTATIN CALCIUM 40 MG: 40 TABLET, FILM COATED ORAL at 20:17

## 2023-05-11 RX ADMIN — METOPROLOL TARTRATE 12.5 MG: 25 TABLET, FILM COATED ORAL at 07:36

## 2023-05-11 RX ADMIN — INSULIN LISPRO 5 UNITS: 100 INJECTION, SOLUTION INTRAVENOUS; SUBCUTANEOUS at 07:36

## 2023-05-11 RX ADMIN — OXYCODONE HYDROCHLORIDE 10 MG: 10 TABLET ORAL at 07:34

## 2023-05-11 RX ADMIN — THERA TABS 1 TABLET: TAB at 07:34

## 2023-05-11 RX ADMIN — CEFTRIAXONE SODIUM 2000 MG: 2 INJECTION, POWDER, FOR SOLUTION INTRAMUSCULAR; INTRAVENOUS at 11:42

## 2023-05-11 RX ADMIN — AMPICILLIN SODIUM 2000 MG: 2 INJECTION, POWDER, FOR SOLUTION INTRAMUSCULAR; INTRAVENOUS at 14:56

## 2023-05-11 RX ADMIN — Medication 400 MG: at 20:17

## 2023-05-11 RX ADMIN — Medication 16 G: at 14:09

## 2023-05-11 RX ADMIN — SODIUM CHLORIDE, PRESERVATIVE FREE 10 ML: 5 INJECTION INTRAVENOUS at 20:31

## 2023-05-11 RX ADMIN — ALLOPURINOL 600 MG: 300 TABLET ORAL at 07:35

## 2023-05-11 RX ADMIN — POTASSIUM CHLORIDE 20 MEQ: 1500 TABLET, EXTENDED RELEASE ORAL at 07:34

## 2023-05-11 RX ADMIN — Medication 400 MG: at 07:35

## 2023-05-11 RX ADMIN — FLUOXETINE 60 MG: 20 CAPSULE ORAL at 07:35

## 2023-05-11 RX ADMIN — TORSEMIDE 40 MG: 20 TABLET ORAL at 07:35

## 2023-05-11 RX ADMIN — MICONAZOLE NITRATE: 2 POWDER TOPICAL at 20:28

## 2023-05-11 RX ADMIN — MICONAZOLE NITRATE: 2 POWDER TOPICAL at 07:45

## 2023-05-11 RX ADMIN — Medication 6 MG: at 20:17

## 2023-05-11 RX ADMIN — GUAIFENESIN 600 MG: 600 TABLET ORAL at 07:35

## 2023-05-11 RX ADMIN — METOPROLOL TARTRATE 12.5 MG: 25 TABLET, FILM COATED ORAL at 20:26

## 2023-05-11 RX ADMIN — ACETAMINOPHEN 1000 MG: 500 TABLET ORAL at 20:18

## 2023-05-11 RX ADMIN — SENNOSIDES AND DOCUSATE SODIUM 1 TABLET: 50; 8.6 TABLET ORAL at 07:35

## 2023-05-11 RX ADMIN — Medication 1000 UNITS: at 07:35

## 2023-05-11 RX ADMIN — INSULIN LISPRO 5 UNITS: 100 INJECTION, SOLUTION INTRAVENOUS; SUBCUTANEOUS at 11:43

## 2023-05-11 RX ADMIN — PANTOPRAZOLE SODIUM 40 MG: 40 TABLET, DELAYED RELEASE ORAL at 05:43

## 2023-05-11 RX ADMIN — AMPICILLIN SODIUM 2000 MG: 2 INJECTION, POWDER, FOR SOLUTION INTRAMUSCULAR; INTRAVENOUS at 00:10

## 2023-05-11 RX ADMIN — SODIUM CHLORIDE 25 ML: 9 INJECTION, SOLUTION INTRAVENOUS at 20:41

## 2023-05-11 RX ADMIN — INSULIN LISPRO 1 UNITS: 100 INJECTION, SOLUTION INTRAVENOUS; SUBCUTANEOUS at 17:22

## 2023-05-11 RX ADMIN — OXYCODONE HYDROCHLORIDE 10 MG: 10 TABLET ORAL at 20:22

## 2023-05-11 RX ADMIN — CEFTRIAXONE SODIUM 2000 MG: 2 INJECTION, POWDER, FOR SOLUTION INTRAMUSCULAR; INTRAVENOUS at 00:11

## 2023-05-11 RX ADMIN — ACETAMINOPHEN 1000 MG: 500 TABLET ORAL at 05:43

## 2023-05-11 RX ADMIN — AMPICILLIN SODIUM 2000 MG: 2 INJECTION, POWDER, FOR SOLUTION INTRAMUSCULAR; INTRAVENOUS at 07:45

## 2023-05-11 RX ADMIN — ACETAMINOPHEN 1000 MG: 500 TABLET ORAL at 14:53

## 2023-05-11 RX ADMIN — ENOXAPARIN SODIUM 40 MG: 100 INJECTION SUBCUTANEOUS at 07:34

## 2023-05-11 RX ADMIN — AMPICILLIN SODIUM 2000 MG: 2 INJECTION, POWDER, FOR SOLUTION INTRAMUSCULAR; INTRAVENOUS at 11:42

## 2023-05-11 RX ADMIN — ASPIRIN 81 MG 324 MG: 81 TABLET ORAL at 07:35

## 2023-05-11 RX ADMIN — INSULIN GLARGINE 14 UNITS: 100 INJECTION, SOLUTION SUBCUTANEOUS at 20:30

## 2023-05-11 RX ADMIN — AMPICILLIN SODIUM 2000 MG: 2 INJECTION, POWDER, FOR SOLUTION INTRAMUSCULAR; INTRAVENOUS at 20:43

## 2023-05-11 RX ADMIN — SENNOSIDES AND DOCUSATE SODIUM 1 TABLET: 50; 8.6 TABLET ORAL at 20:17

## 2023-05-11 RX ADMIN — Medication 400 MG: at 14:53

## 2023-05-11 RX ADMIN — GUAIFENESIN 600 MG: 600 TABLET ORAL at 20:17

## 2023-05-11 RX ADMIN — QUETIAPINE FUMARATE 50 MG: 25 TABLET ORAL at 20:17

## 2023-05-11 ASSESSMENT — PAIN DESCRIPTION - PAIN TYPE
TYPE: ACUTE PAIN;SURGICAL PAIN
TYPE: SURGICAL PAIN

## 2023-05-11 ASSESSMENT — PAIN SCALES - GENERAL
PAINLEVEL_OUTOF10: 7
PAINLEVEL_OUTOF10: 7
PAINLEVEL_OUTOF10: 3

## 2023-05-11 ASSESSMENT — PAIN DESCRIPTION - LOCATION
LOCATION: GENERALIZED
LOCATION: GENERALIZED

## 2023-05-11 ASSESSMENT — PAIN DESCRIPTION - DESCRIPTORS
DESCRIPTORS: ACHING
DESCRIPTORS: ACHING;DISCOMFORT

## 2023-05-11 ASSESSMENT — PAIN DESCRIPTION - ONSET: ONSET: GRADUAL

## 2023-05-11 ASSESSMENT — PAIN DESCRIPTION - FREQUENCY: FREQUENCY: INTERMITTENT

## 2023-05-12 LAB
GLUCOSE BLD-MCNC: 124 MG/DL (ref 70–99)
GLUCOSE BLD-MCNC: 128 MG/DL (ref 70–99)
GLUCOSE BLD-MCNC: 169 MG/DL (ref 70–99)
GLUCOSE BLD-MCNC: 172 MG/DL (ref 70–99)
PERFORMED ON: ABNORMAL

## 2023-05-12 PROCEDURE — 97129 THER IVNTJ 1ST 15 MIN: CPT

## 2023-05-12 PROCEDURE — 2580000003 HC RX 258: Performed by: PHYSICAL MEDICINE & REHABILITATION

## 2023-05-12 PROCEDURE — 97116 GAIT TRAINING THERAPY: CPT

## 2023-05-12 PROCEDURE — 6360000002 HC RX W HCPCS: Performed by: PHYSICAL MEDICINE & REHABILITATION

## 2023-05-12 PROCEDURE — 94760 N-INVAS EAR/PLS OXIMETRY 1: CPT

## 2023-05-12 PROCEDURE — 97110 THERAPEUTIC EXERCISES: CPT

## 2023-05-12 PROCEDURE — 97530 THERAPEUTIC ACTIVITIES: CPT

## 2023-05-12 PROCEDURE — 97130 THER IVNTJ EA ADDL 15 MIN: CPT

## 2023-05-12 PROCEDURE — 1280000000 HC REHAB R&B

## 2023-05-12 PROCEDURE — 6370000000 HC RX 637 (ALT 250 FOR IP): Performed by: PHYSICAL MEDICINE & REHABILITATION

## 2023-05-12 PROCEDURE — 97535 SELF CARE MNGMENT TRAINING: CPT

## 2023-05-12 RX ADMIN — INSULIN GLARGINE 14 UNITS: 100 INJECTION, SOLUTION SUBCUTANEOUS at 21:04

## 2023-05-12 RX ADMIN — GUAIFENESIN 600 MG: 600 TABLET ORAL at 21:04

## 2023-05-12 RX ADMIN — GUAIFENESIN 600 MG: 600 TABLET ORAL at 08:54

## 2023-05-12 RX ADMIN — SODIUM CHLORIDE 25 ML: 9 INJECTION, SOLUTION INTRAVENOUS at 23:59

## 2023-05-12 RX ADMIN — Medication 400 MG: at 15:16

## 2023-05-12 RX ADMIN — ACETAMINOPHEN 1000 MG: 500 TABLET ORAL at 15:16

## 2023-05-12 RX ADMIN — SODIUM CHLORIDE 25 ML: 9 INJECTION, SOLUTION INTRAVENOUS at 00:16

## 2023-05-12 RX ADMIN — POTASSIUM CHLORIDE 40 MEQ: 1500 TABLET, EXTENDED RELEASE ORAL at 08:53

## 2023-05-12 RX ADMIN — MICONAZOLE NITRATE: 2 POWDER TOPICAL at 21:57

## 2023-05-12 RX ADMIN — SODIUM CHLORIDE 25 ML: 9 INJECTION, SOLUTION INTRAVENOUS at 21:00

## 2023-05-12 RX ADMIN — OXYCODONE HYDROCHLORIDE 10 MG: 10 TABLET ORAL at 21:12

## 2023-05-12 RX ADMIN — Medication 400 MG: at 08:54

## 2023-05-12 RX ADMIN — MICONAZOLE NITRATE: 2 POWDER TOPICAL at 08:55

## 2023-05-12 RX ADMIN — ENOXAPARIN SODIUM 40 MG: 100 INJECTION SUBCUTANEOUS at 08:54

## 2023-05-12 RX ADMIN — METOPROLOL TARTRATE 12.5 MG: 25 TABLET, FILM COATED ORAL at 08:53

## 2023-05-12 RX ADMIN — THERA TABS 1 TABLET: TAB at 08:54

## 2023-05-12 RX ADMIN — ALLOPURINOL 600 MG: 300 TABLET ORAL at 08:54

## 2023-05-12 RX ADMIN — FLUOXETINE 60 MG: 20 CAPSULE ORAL at 08:54

## 2023-05-12 RX ADMIN — ATORVASTATIN CALCIUM 40 MG: 40 TABLET, FILM COATED ORAL at 21:14

## 2023-05-12 RX ADMIN — ASPIRIN 81 MG 324 MG: 81 TABLET ORAL at 08:53

## 2023-05-12 RX ADMIN — AMPICILLIN SODIUM 2000 MG: 2 INJECTION, POWDER, FOR SOLUTION INTRAMUSCULAR; INTRAVENOUS at 15:19

## 2023-05-12 RX ADMIN — METOPROLOL TARTRATE 12.5 MG: 25 TABLET, FILM COATED ORAL at 21:04

## 2023-05-12 RX ADMIN — SENNOSIDES AND DOCUSATE SODIUM 1 TABLET: 50; 8.6 TABLET ORAL at 08:52

## 2023-05-12 RX ADMIN — AMPICILLIN SODIUM 2000 MG: 2 INJECTION, POWDER, FOR SOLUTION INTRAMUSCULAR; INTRAVENOUS at 00:14

## 2023-05-12 RX ADMIN — SODIUM CHLORIDE, PRESERVATIVE FREE 20 ML: 5 INJECTION INTRAVENOUS at 21:57

## 2023-05-12 RX ADMIN — SENNOSIDES AND DOCUSATE SODIUM 1 TABLET: 50; 8.6 TABLET ORAL at 21:09

## 2023-05-12 RX ADMIN — ACETAMINOPHEN 1000 MG: 500 TABLET ORAL at 05:36

## 2023-05-12 RX ADMIN — AMPICILLIN SODIUM 2000 MG: 2 INJECTION, POWDER, FOR SOLUTION INTRAMUSCULAR; INTRAVENOUS at 08:45

## 2023-05-12 RX ADMIN — CEFTRIAXONE SODIUM 2000 MG: 2 INJECTION, POWDER, FOR SOLUTION INTRAMUSCULAR; INTRAVENOUS at 11:58

## 2023-05-12 RX ADMIN — QUETIAPINE FUMARATE 50 MG: 25 TABLET ORAL at 21:04

## 2023-05-12 RX ADMIN — PANTOPRAZOLE SODIUM 40 MG: 40 TABLET, DELAYED RELEASE ORAL at 05:36

## 2023-05-12 RX ADMIN — SODIUM CHLORIDE 25 ML: 9 INJECTION, SOLUTION INTRAVENOUS at 04:06

## 2023-05-12 RX ADMIN — OXYCODONE 5 MG: 5 TABLET ORAL at 08:52

## 2023-05-12 RX ADMIN — Medication 1000 UNITS: at 08:53

## 2023-05-12 RX ADMIN — SODIUM CHLORIDE 25 ML: 9 INJECTION, SOLUTION INTRAVENOUS at 23:57

## 2023-05-12 RX ADMIN — Medication 400 MG: at 21:04

## 2023-05-12 RX ADMIN — AMPICILLIN SODIUM 2000 MG: 2 INJECTION, POWDER, FOR SOLUTION INTRAMUSCULAR; INTRAVENOUS at 23:58

## 2023-05-12 RX ADMIN — CEFTRIAXONE SODIUM 2000 MG: 2 INJECTION, POWDER, FOR SOLUTION INTRAMUSCULAR; INTRAVENOUS at 00:17

## 2023-05-12 RX ADMIN — TORSEMIDE 40 MG: 20 TABLET ORAL at 08:53

## 2023-05-12 RX ADMIN — AMPICILLIN SODIUM 2000 MG: 2 INJECTION, POWDER, FOR SOLUTION INTRAMUSCULAR; INTRAVENOUS at 04:08

## 2023-05-12 RX ADMIN — AMPICILLIN SODIUM 2000 MG: 2 INJECTION, POWDER, FOR SOLUTION INTRAMUSCULAR; INTRAVENOUS at 11:57

## 2023-05-12 RX ADMIN — ACETAMINOPHEN 1000 MG: 500 TABLET ORAL at 21:09

## 2023-05-12 RX ADMIN — AMPICILLIN SODIUM 2000 MG: 2 INJECTION, POWDER, FOR SOLUTION INTRAMUSCULAR; INTRAVENOUS at 21:02

## 2023-05-12 RX ADMIN — SODIUM CHLORIDE 25 ML: 9 INJECTION, SOLUTION INTRAVENOUS at 00:12

## 2023-05-12 RX ADMIN — Medication 6 MG: at 21:14

## 2023-05-12 ASSESSMENT — PAIN DESCRIPTION - LOCATION
LOCATION: CHEST;BACK;LEG
LOCATION: GENERALIZED
LOCATION: GENERALIZED

## 2023-05-12 ASSESSMENT — PAIN DESCRIPTION - DESCRIPTORS
DESCRIPTORS: ACHING;DISCOMFORT
DESCRIPTORS: SHOOTING;ACHING
DESCRIPTORS: ACHING;DISCOMFORT

## 2023-05-12 ASSESSMENT — PAIN DESCRIPTION - ORIENTATION: ORIENTATION: RIGHT;LEFT

## 2023-05-12 ASSESSMENT — PAIN DESCRIPTION - FREQUENCY
FREQUENCY: INTERMITTENT
FREQUENCY: INTERMITTENT

## 2023-05-12 ASSESSMENT — PAIN DESCRIPTION - PAIN TYPE
TYPE: ACUTE PAIN
TYPE: ACUTE PAIN

## 2023-05-12 ASSESSMENT — PAIN SCALES - GENERAL
PAINLEVEL_OUTOF10: 5
PAINLEVEL_OUTOF10: 3
PAINLEVEL_OUTOF10: 7

## 2023-05-12 ASSESSMENT — PAIN DESCRIPTION - ONSET
ONSET: GRADUAL
ONSET: GRADUAL

## 2023-05-13 LAB
GLUCOSE BLD-MCNC: 111 MG/DL (ref 70–99)
GLUCOSE BLD-MCNC: 133 MG/DL (ref 70–99)
GLUCOSE BLD-MCNC: 146 MG/DL (ref 70–99)
GLUCOSE BLD-MCNC: 233 MG/DL (ref 70–99)
PERFORMED ON: ABNORMAL

## 2023-05-13 PROCEDURE — 2580000003 HC RX 258: Performed by: PHYSICAL MEDICINE & REHABILITATION

## 2023-05-13 PROCEDURE — 6370000000 HC RX 637 (ALT 250 FOR IP): Performed by: PHYSICAL MEDICINE & REHABILITATION

## 2023-05-13 PROCEDURE — 6360000002 HC RX W HCPCS: Performed by: PHYSICAL MEDICINE & REHABILITATION

## 2023-05-13 PROCEDURE — 1280000000 HC REHAB R&B

## 2023-05-13 RX ADMIN — ATORVASTATIN CALCIUM 40 MG: 40 TABLET, FILM COATED ORAL at 19:42

## 2023-05-13 RX ADMIN — SODIUM CHLORIDE 25 ML: 9 INJECTION, SOLUTION INTRAVENOUS at 04:03

## 2023-05-13 RX ADMIN — POTASSIUM CHLORIDE 40 MEQ: 1500 TABLET, EXTENDED RELEASE ORAL at 08:46

## 2023-05-13 RX ADMIN — AMPICILLIN SODIUM 2000 MG: 2 INJECTION, POWDER, FOR SOLUTION INTRAMUSCULAR; INTRAVENOUS at 04:04

## 2023-05-13 RX ADMIN — OXYCODONE HYDROCHLORIDE 10 MG: 10 TABLET ORAL at 19:41

## 2023-05-13 RX ADMIN — CEFTRIAXONE SODIUM 2000 MG: 2 INJECTION, POWDER, FOR SOLUTION INTRAMUSCULAR; INTRAVENOUS at 00:00

## 2023-05-13 RX ADMIN — GUAIFENESIN 600 MG: 600 TABLET ORAL at 08:46

## 2023-05-13 RX ADMIN — FLUOXETINE 60 MG: 20 CAPSULE ORAL at 08:45

## 2023-05-13 RX ADMIN — SODIUM CHLORIDE, PRESERVATIVE FREE 10 ML: 5 INJECTION INTRAVENOUS at 19:49

## 2023-05-13 RX ADMIN — ALLOPURINOL 600 MG: 300 TABLET ORAL at 08:46

## 2023-05-13 RX ADMIN — SENNOSIDES AND DOCUSATE SODIUM 1 TABLET: 50; 8.6 TABLET ORAL at 08:47

## 2023-05-13 RX ADMIN — METOPROLOL TARTRATE 12.5 MG: 25 TABLET, FILM COATED ORAL at 08:46

## 2023-05-13 RX ADMIN — ASPIRIN 81 MG 324 MG: 81 TABLET ORAL at 08:45

## 2023-05-13 RX ADMIN — Medication 6 MG: at 19:41

## 2023-05-13 RX ADMIN — AMPICILLIN SODIUM 2000 MG: 2 INJECTION, POWDER, FOR SOLUTION INTRAMUSCULAR; INTRAVENOUS at 19:51

## 2023-05-13 RX ADMIN — ENOXAPARIN SODIUM 40 MG: 100 INJECTION SUBCUTANEOUS at 08:45

## 2023-05-13 RX ADMIN — CEFTRIAXONE SODIUM 2000 MG: 2 INJECTION, POWDER, FOR SOLUTION INTRAMUSCULAR; INTRAVENOUS at 12:03

## 2023-05-13 RX ADMIN — AMPICILLIN SODIUM 2000 MG: 2 INJECTION, POWDER, FOR SOLUTION INTRAMUSCULAR; INTRAVENOUS at 09:04

## 2023-05-13 RX ADMIN — Medication 1000 UNITS: at 08:46

## 2023-05-13 RX ADMIN — Medication 400 MG: at 08:46

## 2023-05-13 RX ADMIN — MICONAZOLE NITRATE: 2 POWDER TOPICAL at 19:49

## 2023-05-13 RX ADMIN — INSULIN GLARGINE 14 UNITS: 100 INJECTION, SOLUTION SUBCUTANEOUS at 19:49

## 2023-05-13 RX ADMIN — THERA TABS 1 TABLET: TAB at 08:46

## 2023-05-13 RX ADMIN — Medication 400 MG: at 13:45

## 2023-05-13 RX ADMIN — QUETIAPINE FUMARATE 50 MG: 25 TABLET ORAL at 19:42

## 2023-05-13 RX ADMIN — SENNOSIDES AND DOCUSATE SODIUM 1 TABLET: 50; 8.6 TABLET ORAL at 19:42

## 2023-05-13 RX ADMIN — ACETAMINOPHEN 1000 MG: 500 TABLET ORAL at 22:16

## 2023-05-13 RX ADMIN — TORSEMIDE 40 MG: 20 TABLET ORAL at 08:45

## 2023-05-13 RX ADMIN — AMPICILLIN SODIUM 2000 MG: 2 INJECTION, POWDER, FOR SOLUTION INTRAMUSCULAR; INTRAVENOUS at 12:01

## 2023-05-13 RX ADMIN — Medication 400 MG: at 19:42

## 2023-05-13 RX ADMIN — ACETAMINOPHEN 1000 MG: 500 TABLET ORAL at 13:45

## 2023-05-13 RX ADMIN — SODIUM CHLORIDE, PRESERVATIVE FREE 10 ML: 5 INJECTION INTRAVENOUS at 09:06

## 2023-05-13 RX ADMIN — MICONAZOLE NITRATE: 2 POWDER TOPICAL at 08:51

## 2023-05-13 RX ADMIN — METOPROLOL TARTRATE 12.5 MG: 25 TABLET, FILM COATED ORAL at 19:42

## 2023-05-13 RX ADMIN — ACETAMINOPHEN 1000 MG: 500 TABLET ORAL at 06:00

## 2023-05-13 RX ADMIN — GUAIFENESIN 600 MG: 600 TABLET ORAL at 19:42

## 2023-05-13 RX ADMIN — PANTOPRAZOLE SODIUM 40 MG: 40 TABLET, DELAYED RELEASE ORAL at 06:00

## 2023-05-13 RX ADMIN — AMPICILLIN SODIUM 2000 MG: 2 INJECTION, POWDER, FOR SOLUTION INTRAMUSCULAR; INTRAVENOUS at 16:14

## 2023-05-13 ASSESSMENT — PAIN SCALES - GENERAL
PAINLEVEL_OUTOF10: 0
PAINLEVEL_OUTOF10: 7
PAINLEVEL_OUTOF10: 0
PAINLEVEL_OUTOF10: 7
PAINLEVEL_OUTOF10: 4

## 2023-05-13 ASSESSMENT — PAIN DESCRIPTION - DESCRIPTORS
DESCRIPTORS: ACHING;DISCOMFORT
DESCRIPTORS: ACHING;DISCOMFORT

## 2023-05-13 ASSESSMENT — PAIN DESCRIPTION - LOCATION
LOCATION: GENERALIZED
LOCATION: GENERALIZED

## 2023-05-14 VITALS
TEMPERATURE: 98.2 F | RESPIRATION RATE: 15 BRPM | HEART RATE: 63 BPM | BODY MASS INDEX: 41.22 KG/M2 | WEIGHT: 287.92 LBS | SYSTOLIC BLOOD PRESSURE: 131 MMHG | HEIGHT: 70 IN | DIASTOLIC BLOOD PRESSURE: 54 MMHG | OXYGEN SATURATION: 94 %

## 2023-05-14 LAB
GLUCOSE BLD-MCNC: 135 MG/DL (ref 70–99)
GLUCOSE BLD-MCNC: 145 MG/DL (ref 70–99)
GLUCOSE BLD-MCNC: 158 MG/DL (ref 70–99)
GLUCOSE BLD-MCNC: 196 MG/DL (ref 70–99)
PERFORMED ON: ABNORMAL

## 2023-05-14 PROCEDURE — 2580000003 HC RX 258: Performed by: PHYSICAL MEDICINE & REHABILITATION

## 2023-05-14 PROCEDURE — 1280000000 HC REHAB R&B

## 2023-05-14 PROCEDURE — 6370000000 HC RX 637 (ALT 250 FOR IP): Performed by: PHYSICAL MEDICINE & REHABILITATION

## 2023-05-14 PROCEDURE — 94760 N-INVAS EAR/PLS OXIMETRY 1: CPT

## 2023-05-14 PROCEDURE — 6360000002 HC RX W HCPCS: Performed by: PHYSICAL MEDICINE & REHABILITATION

## 2023-05-14 RX ADMIN — Medication 1000 UNITS: at 08:33

## 2023-05-14 RX ADMIN — SENNOSIDES AND DOCUSATE SODIUM 1 TABLET: 50; 8.6 TABLET ORAL at 20:16

## 2023-05-14 RX ADMIN — THERA TABS 1 TABLET: TAB at 08:51

## 2023-05-14 RX ADMIN — GUAIFENESIN 600 MG: 600 TABLET ORAL at 08:34

## 2023-05-14 RX ADMIN — SODIUM CHLORIDE, PRESERVATIVE FREE 10 ML: 5 INJECTION INTRAVENOUS at 08:51

## 2023-05-14 RX ADMIN — CEFTRIAXONE SODIUM 2000 MG: 2 INJECTION, POWDER, FOR SOLUTION INTRAMUSCULAR; INTRAVENOUS at 00:11

## 2023-05-14 RX ADMIN — ACETAMINOPHEN 1000 MG: 500 TABLET ORAL at 05:37

## 2023-05-14 RX ADMIN — POTASSIUM CHLORIDE 40 MEQ: 1500 TABLET, EXTENDED RELEASE ORAL at 08:33

## 2023-05-14 RX ADMIN — ATORVASTATIN CALCIUM 40 MG: 40 TABLET, FILM COATED ORAL at 20:16

## 2023-05-14 RX ADMIN — ACETAMINOPHEN 1000 MG: 500 TABLET ORAL at 14:55

## 2023-05-14 RX ADMIN — ACETAMINOPHEN 1000 MG: 500 TABLET ORAL at 20:16

## 2023-05-14 RX ADMIN — METOPROLOL TARTRATE 12.5 MG: 25 TABLET, FILM COATED ORAL at 08:34

## 2023-05-14 RX ADMIN — PANTOPRAZOLE SODIUM 40 MG: 40 TABLET, DELAYED RELEASE ORAL at 05:37

## 2023-05-14 RX ADMIN — AMPICILLIN SODIUM 2000 MG: 2 INJECTION, POWDER, FOR SOLUTION INTRAMUSCULAR; INTRAVENOUS at 16:57

## 2023-05-14 RX ADMIN — AMPICILLIN SODIUM 2000 MG: 2 INJECTION, POWDER, FOR SOLUTION INTRAMUSCULAR; INTRAVENOUS at 03:30

## 2023-05-14 RX ADMIN — Medication 400 MG: at 08:34

## 2023-05-14 RX ADMIN — TORSEMIDE 40 MG: 20 TABLET ORAL at 08:33

## 2023-05-14 RX ADMIN — ASPIRIN 81 MG 324 MG: 81 TABLET ORAL at 08:33

## 2023-05-14 RX ADMIN — AMPICILLIN SODIUM 2000 MG: 2 INJECTION, POWDER, FOR SOLUTION INTRAMUSCULAR; INTRAVENOUS at 08:50

## 2023-05-14 RX ADMIN — AMPICILLIN SODIUM 2000 MG: 2 INJECTION, POWDER, FOR SOLUTION INTRAMUSCULAR; INTRAVENOUS at 12:21

## 2023-05-14 RX ADMIN — INSULIN GLARGINE 14 UNITS: 100 INJECTION, SOLUTION SUBCUTANEOUS at 21:10

## 2023-05-14 RX ADMIN — CEFTRIAXONE SODIUM 2000 MG: 2 INJECTION, POWDER, FOR SOLUTION INTRAMUSCULAR; INTRAVENOUS at 12:20

## 2023-05-14 RX ADMIN — MICONAZOLE NITRATE: 2 POWDER TOPICAL at 08:39

## 2023-05-14 RX ADMIN — Medication 400 MG: at 14:55

## 2023-05-14 RX ADMIN — ENOXAPARIN SODIUM 40 MG: 100 INJECTION SUBCUTANEOUS at 08:40

## 2023-05-14 RX ADMIN — ALLOPURINOL 600 MG: 300 TABLET ORAL at 08:33

## 2023-05-14 RX ADMIN — GUAIFENESIN 600 MG: 600 TABLET ORAL at 20:16

## 2023-05-14 RX ADMIN — QUETIAPINE FUMARATE 50 MG: 25 TABLET ORAL at 20:16

## 2023-05-14 RX ADMIN — Medication 400 MG: at 20:16

## 2023-05-14 RX ADMIN — SENNOSIDES AND DOCUSATE SODIUM 1 TABLET: 50; 8.6 TABLET ORAL at 08:34

## 2023-05-14 RX ADMIN — METOPROLOL TARTRATE 12.5 MG: 25 TABLET, FILM COATED ORAL at 20:16

## 2023-05-14 RX ADMIN — MICONAZOLE NITRATE: 2 POWDER TOPICAL at 20:14

## 2023-05-14 RX ADMIN — OXYCODONE 5 MG: 5 TABLET ORAL at 08:33

## 2023-05-14 RX ADMIN — AMPICILLIN SODIUM 2000 MG: 2 INJECTION, POWDER, FOR SOLUTION INTRAMUSCULAR; INTRAVENOUS at 20:22

## 2023-05-14 RX ADMIN — SODIUM CHLORIDE, PRESERVATIVE FREE 10 ML: 5 INJECTION INTRAVENOUS at 20:15

## 2023-05-14 RX ADMIN — AMPICILLIN SODIUM 2000 MG: 2 INJECTION, POWDER, FOR SOLUTION INTRAMUSCULAR; INTRAVENOUS at 00:12

## 2023-05-14 RX ADMIN — Medication 6 MG: at 20:16

## 2023-05-14 RX ADMIN — FLUOXETINE 60 MG: 20 CAPSULE ORAL at 08:34

## 2023-05-14 ASSESSMENT — PAIN DESCRIPTION - LOCATION
LOCATION: GENERALIZED

## 2023-05-14 ASSESSMENT — PAIN - FUNCTIONAL ASSESSMENT
PAIN_FUNCTIONAL_ASSESSMENT: ACTIVITIES ARE NOT PREVENTED

## 2023-05-14 ASSESSMENT — PAIN SCALES - GENERAL
PAINLEVEL_OUTOF10: 7
PAINLEVEL_OUTOF10: 7
PAINLEVEL_OUTOF10: 4
PAINLEVEL_OUTOF10: 5
PAINLEVEL_OUTOF10: 5
PAINLEVEL_OUTOF10: 0

## 2023-05-14 ASSESSMENT — PAIN DESCRIPTION - DESCRIPTORS
DESCRIPTORS: ACHING;DISCOMFORT
DESCRIPTORS: DISCOMFORT;ACHING

## 2023-05-15 LAB
ALBUMIN SERPL-MCNC: 3.2 G/DL (ref 3.4–5)
ALBUMIN/GLOB SERPL: 1 {RATIO} (ref 1.1–2.2)
ALP SERPL-CCNC: 79 U/L (ref 40–129)
ALT SERPL-CCNC: 8 U/L (ref 10–40)
ANION GAP SERPL CALCULATED.3IONS-SCNC: 8 MMOL/L (ref 3–16)
AST SERPL-CCNC: 15 U/L (ref 15–37)
BASOPHILS # BLD: 0 K/UL (ref 0–0.2)
BASOPHILS NFR BLD: 0.4 %
BILIRUB SERPL-MCNC: <0.2 MG/DL (ref 0–1)
BUN SERPL-MCNC: 14 MG/DL (ref 7–20)
CALCIUM SERPL-MCNC: 8.6 MG/DL (ref 8.3–10.6)
CHLORIDE SERPL-SCNC: 104 MMOL/L (ref 99–110)
CO2 SERPL-SCNC: 31 MMOL/L (ref 21–32)
CREAT SERPL-MCNC: 0.7 MG/DL (ref 0.8–1.3)
CRP SERPL-MCNC: 16.4 MG/L (ref 0–5.1)
DEPRECATED RDW RBC AUTO: 17.5 % (ref 12.4–15.4)
EOSINOPHIL # BLD: 0.4 K/UL (ref 0–0.6)
EOSINOPHIL NFR BLD: 5.6 %
ERYTHROCYTE [SEDIMENTATION RATE] IN BLOOD BY WESTERGREN METHOD: 36 MM/HR (ref 0–20)
GFR SERPLBLD CREATININE-BSD FMLA CKD-EPI: >60 ML/MIN/{1.73_M2}
GLUCOSE BLD-MCNC: 137 MG/DL (ref 70–99)
GLUCOSE BLD-MCNC: 145 MG/DL (ref 70–99)
GLUCOSE BLD-MCNC: 224 MG/DL (ref 70–99)
GLUCOSE BLD-MCNC: 95 MG/DL (ref 70–99)
GLUCOSE SERPL-MCNC: 121 MG/DL (ref 70–99)
HCT VFR BLD AUTO: 27.2 % (ref 40.5–52.5)
HGB BLD-MCNC: 8.7 G/DL (ref 13.5–17.5)
LYMPHOCYTES # BLD: 1.6 K/UL (ref 1–5.1)
LYMPHOCYTES NFR BLD: 20.2 %
MAGNESIUM SERPL-MCNC: 1.6 MG/DL (ref 1.8–2.4)
MCH RBC QN AUTO: 29.4 PG (ref 26–34)
MCHC RBC AUTO-ENTMCNC: 31.9 G/DL (ref 31–36)
MCV RBC AUTO: 92.1 FL (ref 80–100)
MONOCYTES # BLD: 0.7 K/UL (ref 0–1.3)
MONOCYTES NFR BLD: 8.7 %
NEUTROPHILS # BLD: 5 K/UL (ref 1.7–7.7)
NEUTROPHILS NFR BLD: 65.1 %
PERFORMED ON: ABNORMAL
PERFORMED ON: NORMAL
PLATELET # BLD AUTO: 265 K/UL (ref 135–450)
PMV BLD AUTO: 8.2 FL (ref 5–10.5)
POTASSIUM SERPL-SCNC: 3.5 MMOL/L (ref 3.5–5.1)
PROT SERPL-MCNC: 6.3 G/DL (ref 6.4–8.2)
RBC # BLD AUTO: 2.95 M/UL (ref 4.2–5.9)
SODIUM SERPL-SCNC: 143 MMOL/L (ref 136–145)
WBC # BLD AUTO: 7.8 K/UL (ref 4–11)

## 2023-05-15 PROCEDURE — 97116 GAIT TRAINING THERAPY: CPT

## 2023-05-15 PROCEDURE — 97530 THERAPEUTIC ACTIVITIES: CPT

## 2023-05-15 PROCEDURE — 85652 RBC SED RATE AUTOMATED: CPT

## 2023-05-15 PROCEDURE — 97129 THER IVNTJ 1ST 15 MIN: CPT

## 2023-05-15 PROCEDURE — 94760 N-INVAS EAR/PLS OXIMETRY 1: CPT

## 2023-05-15 PROCEDURE — 6360000002 HC RX W HCPCS: Performed by: PHYSICAL MEDICINE & REHABILITATION

## 2023-05-15 PROCEDURE — 97110 THERAPEUTIC EXERCISES: CPT

## 2023-05-15 PROCEDURE — 83735 ASSAY OF MAGNESIUM: CPT

## 2023-05-15 PROCEDURE — 80053 COMPREHEN METABOLIC PANEL: CPT

## 2023-05-15 PROCEDURE — 97130 THER IVNTJ EA ADDL 15 MIN: CPT

## 2023-05-15 PROCEDURE — 6370000000 HC RX 637 (ALT 250 FOR IP): Performed by: PHYSICAL MEDICINE & REHABILITATION

## 2023-05-15 PROCEDURE — 97535 SELF CARE MNGMENT TRAINING: CPT

## 2023-05-15 PROCEDURE — 2580000003 HC RX 258: Performed by: PHYSICAL MEDICINE & REHABILITATION

## 2023-05-15 PROCEDURE — 85025 COMPLETE CBC W/AUTO DIFF WBC: CPT

## 2023-05-15 PROCEDURE — 1280000000 HC REHAB R&B

## 2023-05-15 PROCEDURE — 36592 COLLECT BLOOD FROM PICC: CPT

## 2023-05-15 PROCEDURE — 86140 C-REACTIVE PROTEIN: CPT

## 2023-05-15 RX ADMIN — AMPICILLIN SODIUM 2000 MG: 2 INJECTION, POWDER, FOR SOLUTION INTRAMUSCULAR; INTRAVENOUS at 12:59

## 2023-05-15 RX ADMIN — ATORVASTATIN CALCIUM 40 MG: 40 TABLET, FILM COATED ORAL at 21:17

## 2023-05-15 RX ADMIN — Medication 6 MG: at 21:16

## 2023-05-15 RX ADMIN — OXYCODONE HYDROCHLORIDE 10 MG: 10 TABLET ORAL at 08:54

## 2023-05-15 RX ADMIN — ALLOPURINOL 600 MG: 300 TABLET ORAL at 08:48

## 2023-05-15 RX ADMIN — METOPROLOL TARTRATE 12.5 MG: 25 TABLET, FILM COATED ORAL at 08:54

## 2023-05-15 RX ADMIN — GUAIFENESIN 600 MG: 600 TABLET ORAL at 08:48

## 2023-05-15 RX ADMIN — SODIUM CHLORIDE, PRESERVATIVE FREE 10 ML: 5 INJECTION INTRAVENOUS at 21:15

## 2023-05-15 RX ADMIN — AMPICILLIN SODIUM 2000 MG: 2 INJECTION, POWDER, FOR SOLUTION INTRAMUSCULAR; INTRAVENOUS at 16:13

## 2023-05-15 RX ADMIN — FLUOXETINE 60 MG: 20 CAPSULE ORAL at 08:48

## 2023-05-15 RX ADMIN — Medication 400 MG: at 08:48

## 2023-05-15 RX ADMIN — TORSEMIDE 40 MG: 20 TABLET ORAL at 08:48

## 2023-05-15 RX ADMIN — INSULIN LISPRO 2 UNITS: 100 INJECTION, SOLUTION INTRAVENOUS; SUBCUTANEOUS at 13:01

## 2023-05-15 RX ADMIN — Medication 400 MG: at 21:17

## 2023-05-15 RX ADMIN — SODIUM CHLORIDE 25 ML: 9 INJECTION, SOLUTION INTRAVENOUS at 19:56

## 2023-05-15 RX ADMIN — CEFTRIAXONE SODIUM 2000 MG: 2 INJECTION, POWDER, FOR SOLUTION INTRAMUSCULAR; INTRAVENOUS at 10:57

## 2023-05-15 RX ADMIN — ACETAMINOPHEN 1000 MG: 500 TABLET ORAL at 05:43

## 2023-05-15 RX ADMIN — AMPICILLIN SODIUM 2000 MG: 2 INJECTION, POWDER, FOR SOLUTION INTRAMUSCULAR; INTRAVENOUS at 00:20

## 2023-05-15 RX ADMIN — METOPROLOL TARTRATE 12.5 MG: 25 TABLET, FILM COATED ORAL at 21:17

## 2023-05-15 RX ADMIN — MICONAZOLE NITRATE: 2 POWDER TOPICAL at 08:49

## 2023-05-15 RX ADMIN — OXYCODONE HYDROCHLORIDE 10 MG: 10 TABLET ORAL at 20:06

## 2023-05-15 RX ADMIN — SENNOSIDES AND DOCUSATE SODIUM 1 TABLET: 50; 8.6 TABLET ORAL at 08:48

## 2023-05-15 RX ADMIN — SENNOSIDES AND DOCUSATE SODIUM 1 TABLET: 50; 8.6 TABLET ORAL at 21:17

## 2023-05-15 RX ADMIN — POTASSIUM CHLORIDE 40 MEQ: 1500 TABLET, EXTENDED RELEASE ORAL at 08:48

## 2023-05-15 RX ADMIN — AMPICILLIN SODIUM 2000 MG: 2 INJECTION, POWDER, FOR SOLUTION INTRAMUSCULAR; INTRAVENOUS at 19:57

## 2023-05-15 RX ADMIN — GUAIFENESIN 600 MG: 600 TABLET ORAL at 21:17

## 2023-05-15 RX ADMIN — PANTOPRAZOLE SODIUM 40 MG: 40 TABLET, DELAYED RELEASE ORAL at 05:43

## 2023-05-15 RX ADMIN — MICONAZOLE NITRATE: 2 POWDER TOPICAL at 21:18

## 2023-05-15 RX ADMIN — ENOXAPARIN SODIUM 40 MG: 100 INJECTION SUBCUTANEOUS at 08:48

## 2023-05-15 RX ADMIN — Medication 1000 UNITS: at 08:48

## 2023-05-15 RX ADMIN — OXYCODONE HYDROCHLORIDE 10 MG: 10 TABLET ORAL at 15:04

## 2023-05-15 RX ADMIN — Medication 400 MG: at 13:01

## 2023-05-15 RX ADMIN — SODIUM CHLORIDE, PRESERVATIVE FREE 10 ML: 5 INJECTION INTRAVENOUS at 08:49

## 2023-05-15 RX ADMIN — ACETAMINOPHEN 1000 MG: 500 TABLET ORAL at 21:16

## 2023-05-15 RX ADMIN — INSULIN GLARGINE 14 UNITS: 100 INJECTION, SOLUTION SUBCUTANEOUS at 21:18

## 2023-05-15 RX ADMIN — AMPICILLIN SODIUM 2000 MG: 2 INJECTION, POWDER, FOR SOLUTION INTRAMUSCULAR; INTRAVENOUS at 04:14

## 2023-05-15 RX ADMIN — ASPIRIN 81 MG 324 MG: 81 TABLET ORAL at 08:48

## 2023-05-15 RX ADMIN — CEFTRIAXONE SODIUM 2000 MG: 2 INJECTION, POWDER, FOR SOLUTION INTRAMUSCULAR; INTRAVENOUS at 00:19

## 2023-05-15 RX ADMIN — QUETIAPINE FUMARATE 50 MG: 25 TABLET ORAL at 21:17

## 2023-05-15 RX ADMIN — THERA TABS 1 TABLET: TAB at 08:48

## 2023-05-15 RX ADMIN — ACETAMINOPHEN 1000 MG: 500 TABLET ORAL at 13:01

## 2023-05-15 RX ADMIN — AMPICILLIN SODIUM 2000 MG: 2 INJECTION, POWDER, FOR SOLUTION INTRAMUSCULAR; INTRAVENOUS at 09:03

## 2023-05-15 ASSESSMENT — PAIN DESCRIPTION - LOCATION
LOCATION: BACK;LEG;SHOULDER
LOCATION: GENERALIZED
LOCATION: BACK;KNEE
LOCATION: BACK;NECK
LOCATION: GENERALIZED

## 2023-05-15 ASSESSMENT — PAIN DESCRIPTION - ORIENTATION
ORIENTATION: RIGHT;LEFT
ORIENTATION: UPPER
ORIENTATION: RIGHT;LEFT;MID;LOWER

## 2023-05-15 ASSESSMENT — PAIN SCALES - GENERAL
PAINLEVEL_OUTOF10: 3
PAINLEVEL_OUTOF10: 7
PAINLEVEL_OUTOF10: 6
PAINLEVEL_OUTOF10: 7
PAINLEVEL_OUTOF10: 5
PAINLEVEL_OUTOF10: 0
PAINLEVEL_OUTOF10: 4

## 2023-05-15 ASSESSMENT — PAIN DESCRIPTION - DESCRIPTORS
DESCRIPTORS: ACHING
DESCRIPTORS: ACHING
DESCRIPTORS: ACHING;DISCOMFORT
DESCRIPTORS: ACHING
DESCRIPTORS: ACHING;DISCOMFORT

## 2023-05-16 LAB
GLUCOSE BLD-MCNC: 163 MG/DL (ref 70–99)
GLUCOSE BLD-MCNC: 164 MG/DL (ref 70–99)
GLUCOSE BLD-MCNC: 171 MG/DL (ref 70–99)
GLUCOSE BLD-MCNC: 177 MG/DL (ref 70–99)
PERFORMED ON: ABNORMAL

## 2023-05-16 PROCEDURE — 6360000002 HC RX W HCPCS: Performed by: PHYSICAL MEDICINE & REHABILITATION

## 2023-05-16 PROCEDURE — 97530 THERAPEUTIC ACTIVITIES: CPT

## 2023-05-16 PROCEDURE — 94760 N-INVAS EAR/PLS OXIMETRY 1: CPT

## 2023-05-16 PROCEDURE — 6370000000 HC RX 637 (ALT 250 FOR IP): Performed by: PHYSICAL MEDICINE & REHABILITATION

## 2023-05-16 PROCEDURE — 97130 THER IVNTJ EA ADDL 15 MIN: CPT

## 2023-05-16 PROCEDURE — 97110 THERAPEUTIC EXERCISES: CPT

## 2023-05-16 PROCEDURE — 2580000003 HC RX 258: Performed by: PHYSICAL MEDICINE & REHABILITATION

## 2023-05-16 PROCEDURE — 97535 SELF CARE MNGMENT TRAINING: CPT

## 2023-05-16 PROCEDURE — 1280000000 HC REHAB R&B

## 2023-05-16 PROCEDURE — 97116 GAIT TRAINING THERAPY: CPT

## 2023-05-16 PROCEDURE — 97129 THER IVNTJ 1ST 15 MIN: CPT

## 2023-05-16 RX ORDER — POTASSIUM CHLORIDE 20 MEQ/1
40 TABLET, EXTENDED RELEASE ORAL
Qty: 60 TABLET | Refills: 0 | Status: SHIPPED | OUTPATIENT
Start: 2023-05-17

## 2023-05-16 RX ORDER — LANOLIN ALCOHOL/MO/W.PET/CERES
400 CREAM (GRAM) TOPICAL 3 TIMES DAILY
Qty: 90 TABLET | Refills: 0 | Status: SHIPPED | OUTPATIENT
Start: 2023-05-16

## 2023-05-16 RX ORDER — ATORVASTATIN CALCIUM 40 MG/1
40 TABLET, FILM COATED ORAL NIGHTLY
Qty: 30 TABLET | Refills: 0 | Status: SHIPPED | OUTPATIENT
Start: 2023-05-16

## 2023-05-16 RX ADMIN — AMPICILLIN SODIUM 2000 MG: 2 INJECTION, POWDER, FOR SOLUTION INTRAMUSCULAR; INTRAVENOUS at 16:13

## 2023-05-16 RX ADMIN — PANTOPRAZOLE SODIUM 40 MG: 40 TABLET, DELAYED RELEASE ORAL at 06:09

## 2023-05-16 RX ADMIN — AMPICILLIN SODIUM 2000 MG: 2 INJECTION, POWDER, FOR SOLUTION INTRAMUSCULAR; INTRAVENOUS at 00:20

## 2023-05-16 RX ADMIN — SENNOSIDES AND DOCUSATE SODIUM 1 TABLET: 50; 8.6 TABLET ORAL at 08:20

## 2023-05-16 RX ADMIN — THERA TABS 1 TABLET: TAB at 08:20

## 2023-05-16 RX ADMIN — SODIUM CHLORIDE 25 ML: 9 INJECTION, SOLUTION INTRAVENOUS at 00:13

## 2023-05-16 RX ADMIN — INSULIN GLARGINE 14 UNITS: 100 INJECTION, SOLUTION SUBCUTANEOUS at 21:17

## 2023-05-16 RX ADMIN — CEFTRIAXONE SODIUM 2000 MG: 2 INJECTION, POWDER, FOR SOLUTION INTRAMUSCULAR; INTRAVENOUS at 11:43

## 2023-05-16 RX ADMIN — AMPICILLIN SODIUM 2000 MG: 2 INJECTION, POWDER, FOR SOLUTION INTRAMUSCULAR; INTRAVENOUS at 20:32

## 2023-05-16 RX ADMIN — ENOXAPARIN SODIUM 40 MG: 100 INJECTION SUBCUTANEOUS at 08:27

## 2023-05-16 RX ADMIN — SENNOSIDES AND DOCUSATE SODIUM 1 TABLET: 50; 8.6 TABLET ORAL at 21:01

## 2023-05-16 RX ADMIN — GUAIFENESIN 600 MG: 600 TABLET ORAL at 21:01

## 2023-05-16 RX ADMIN — ATORVASTATIN CALCIUM 40 MG: 40 TABLET, FILM COATED ORAL at 21:01

## 2023-05-16 RX ADMIN — SODIUM CHLORIDE 25 ML: 9 INJECTION, SOLUTION INTRAVENOUS at 08:24

## 2023-05-16 RX ADMIN — Medication 400 MG: at 21:01

## 2023-05-16 RX ADMIN — ALLOPURINOL 600 MG: 300 TABLET ORAL at 08:21

## 2023-05-16 RX ADMIN — FLUOXETINE 60 MG: 20 CAPSULE ORAL at 08:20

## 2023-05-16 RX ADMIN — SODIUM CHLORIDE, PRESERVATIVE FREE 10 ML: 5 INJECTION INTRAVENOUS at 21:00

## 2023-05-16 RX ADMIN — SODIUM CHLORIDE 25 ML: 9 INJECTION, SOLUTION INTRAVENOUS at 11:41

## 2023-05-16 RX ADMIN — OXYCODONE HYDROCHLORIDE 10 MG: 10 TABLET ORAL at 16:20

## 2023-05-16 RX ADMIN — Medication 6 MG: at 21:00

## 2023-05-16 RX ADMIN — CEFTRIAXONE SODIUM 2000 MG: 2 INJECTION, POWDER, FOR SOLUTION INTRAMUSCULAR; INTRAVENOUS at 00:15

## 2023-05-16 RX ADMIN — CEFTRIAXONE SODIUM 2000 MG: 2 INJECTION, POWDER, FOR SOLUTION INTRAMUSCULAR; INTRAVENOUS at 23:31

## 2023-05-16 RX ADMIN — Medication 400 MG: at 14:14

## 2023-05-16 RX ADMIN — SODIUM CHLORIDE 25 ML: 9 INJECTION, SOLUTION INTRAVENOUS at 04:02

## 2023-05-16 RX ADMIN — POTASSIUM CHLORIDE 40 MEQ: 1500 TABLET, EXTENDED RELEASE ORAL at 08:21

## 2023-05-16 RX ADMIN — AMPICILLIN SODIUM 2000 MG: 2 INJECTION, POWDER, FOR SOLUTION INTRAMUSCULAR; INTRAVENOUS at 04:03

## 2023-05-16 RX ADMIN — QUETIAPINE FUMARATE 50 MG: 25 TABLET ORAL at 21:01

## 2023-05-16 RX ADMIN — ACETAMINOPHEN 1000 MG: 500 TABLET ORAL at 14:14

## 2023-05-16 RX ADMIN — ACETAMINOPHEN 1000 MG: 500 TABLET ORAL at 23:11

## 2023-05-16 RX ADMIN — Medication 400 MG: at 08:20

## 2023-05-16 RX ADMIN — Medication 1000 UNITS: at 08:20

## 2023-05-16 RX ADMIN — METOPROLOL TARTRATE 12.5 MG: 25 TABLET, FILM COATED ORAL at 21:06

## 2023-05-16 RX ADMIN — SODIUM CHLORIDE 25 ML: 9 INJECTION, SOLUTION INTRAVENOUS at 00:19

## 2023-05-16 RX ADMIN — MICONAZOLE NITRATE: 2 POWDER TOPICAL at 11:43

## 2023-05-16 RX ADMIN — ACETAMINOPHEN 1000 MG: 500 TABLET ORAL at 06:09

## 2023-05-16 RX ADMIN — SODIUM CHLORIDE 25 ML: 9 INJECTION, SOLUTION INTRAVENOUS at 16:11

## 2023-05-16 RX ADMIN — ASPIRIN 81 MG 324 MG: 81 TABLET ORAL at 08:20

## 2023-05-16 RX ADMIN — TORSEMIDE 40 MG: 20 TABLET ORAL at 08:20

## 2023-05-16 RX ADMIN — SODIUM CHLORIDE, PRESERVATIVE FREE 10 ML: 5 INJECTION INTRAVENOUS at 08:28

## 2023-05-16 RX ADMIN — GUAIFENESIN 600 MG: 600 TABLET ORAL at 08:20

## 2023-05-16 RX ADMIN — AMPICILLIN SODIUM 2000 MG: 2 INJECTION, POWDER, FOR SOLUTION INTRAMUSCULAR; INTRAVENOUS at 08:26

## 2023-05-16 RX ADMIN — MICONAZOLE NITRATE: 2 POWDER TOPICAL at 23:14

## 2023-05-16 RX ADMIN — METOPROLOL TARTRATE 12.5 MG: 25 TABLET, FILM COATED ORAL at 08:20

## 2023-05-16 RX ADMIN — AMPICILLIN SODIUM 2000 MG: 2 INJECTION, POWDER, FOR SOLUTION INTRAMUSCULAR; INTRAVENOUS at 12:26

## 2023-05-16 ASSESSMENT — PAIN SCALES - GENERAL
PAINLEVEL_OUTOF10: 0
PAINLEVEL_OUTOF10: 0
PAINLEVEL_OUTOF10: 4
PAINLEVEL_OUTOF10: 7
PAINLEVEL_OUTOF10: 3
PAINLEVEL_OUTOF10: 8

## 2023-05-16 ASSESSMENT — PAIN DESCRIPTION - DESCRIPTORS
DESCRIPTORS: ACHING;DISCOMFORT
DESCRIPTORS: ACHING;DISCOMFORT
DESCRIPTORS: ACHING

## 2023-05-16 ASSESSMENT — PAIN DESCRIPTION - ORIENTATION: ORIENTATION: RIGHT;LEFT

## 2023-05-16 ASSESSMENT — PAIN - FUNCTIONAL ASSESSMENT: PAIN_FUNCTIONAL_ASSESSMENT: PREVENTS OR INTERFERES SOME ACTIVE ACTIVITIES AND ADLS

## 2023-05-16 ASSESSMENT — PAIN SCALES - WONG BAKER: WONGBAKER_NUMERICALRESPONSE: 0

## 2023-05-16 ASSESSMENT — PAIN DESCRIPTION - LOCATION
LOCATION: CHEST
LOCATION: GENERALIZED
LOCATION: LEG

## 2023-05-16 NOTE — DISCHARGE INSTR - COC
Continuity of Care Form    Patient Name: Marly Stanton   :  1940  MRN:  [id number]    Admit date:  2023  Discharge date:  23    Code Status Order: Full Code   Advance Directives:     Admitting Physician:  Lam Ochoa MD  PCP: Fabiola Evans MD    Discharging Nurse: [facility] Unit/Room#: F1Y-5602/5673-68  Discharging Unit Phone Number: [phone]    Emergency Contact:   Extended Emergency Contact Information  Primary Emergency Contact: [facility]  Address: [address], [address] [place] [address] Phone: [phone]  Mobile Phone: [phone]  Relation: Spouse    Past Surgical History:  Past Surgical History:   Procedure Laterality Date    AORTIC VALVE REPAIR      TAVR 2022 then open AVR 2023 with Dr. Carter So      2023 with Dr. Elias Osullivan       Immunization History:   Immunization History   Administered Date(s) Administered    COVID-19, PFIZER PURPLE top, DILUTE for use, (age 15 y+), 30mcg/0.3mL 2021, 2021, 10/19/2021       Active Problems:  Patient Active Problem List   Diagnosis Code    Endocarditis of prosthetic aortic valve ([facility] [address] T82. 6XXA, I35.8       Isolation/Infection:   Isolation            No Isolation          Patient Infection Status       None to display            Nurse Assessment:  Last Vital Signs: /74   Pulse 99   Temp 97.9 °F (36.6 °C) (Oral)   Resp 16   Ht 5' 10\" (1.778 m)   Wt 283 lb 1.1 oz (128.4 kg)   SpO2 94%   BMI 40.62 kg/m²     Last documented pain score (0-10 scale): Pain Level: 0  Last Weight:   Wt Readings from Last 1 Encounters:   23 283 lb 1.1 oz (128.4 kg)     Mental Status:  alert and oriented x 4    IV Access:  - PICC - site  R Upper Arm, insertion date:      Nursing Mobility/ADLs:  Walking   Assisted  Transfer  Assisted  Bathing  Assisted  Dressing  Assisted  Toileting  Assisted  Feeding   [facility]

## 2023-05-16 NOTE — CARE COORDINATION
05/16/23 1446   IMM Letter   IMM Letter given to Patient/Family/Significant other/Guardian/POA/by: presnted to patient and wife by rosalba Patrick   IMM Letter date given: 05/16/23   IMM Letter time given: 1444

## 2023-05-16 NOTE — CARE COORDINATION
SOCIAL WORK DISCHARGE SUMMARY        DATE OF DISCHARGE:     wed 5-      LOCATION:    Home      Discharging to Facility/ Agency   Name:  [facility] care    Address: [address] [facility] [place] [facility] [place]  Phone: [phone]  Fax: [phone] INFUSION PHARMACY:  Name:     [facility]  Address: [facility]. [facility]. [address]  Phone:    [phone]  Fax:        [phone]           TIME:      wife   10 am        PHARMACY:    [facility]        DME:       none ordered.          Johann Del Valle [place]     Case Management   [phone]    5/16/2023  2:46 PM

## 2023-05-16 NOTE — CARE COORDINATION
Spoke with wife over the phone to discuss DC for tomorrow. Informed her that he continues to need assist to get up from the chair and bed. She reports she is aware of this but has worked with family who will be staying with the through the weekend to assist.  She also reports there is a lift chair in the home and that he can sleep in it if needed. He has done this at other times before. She confirmed desire to use [facility] for home care orders. She will transport him with  around 10 am.  She would like to use [facility] [employer] [facility] for any scripts. Joceline Aranda [place]     Case Management   [phone]    5/16/2023  1:19 PM    Spoke with Gema Clements of [facility] who is also following for DC needs. The Plan for Transition of Care is related to the following treatment goals: to continue his progress in personal care, ambulation, transfers, and IV medications in home setting. The Patient and/or patient representative and wife  was provided with a choice of provider and agrees   with the discharge plan. [x] Yes [] No    Freedom of choice list was provided with basic dialogue that supports the patient's individualized plan of care/goals, treatment preferences and shares the quality data associated with the providers.  [x] Yes [] No    Brayan Lazcano     Case Management   [phone]    5/16/2023  1:23 PM

## 2023-05-17 VITALS
SYSTOLIC BLOOD PRESSURE: 127 MMHG | HEIGHT: 70 IN | OXYGEN SATURATION: 95 % | HEART RATE: 62 BPM | TEMPERATURE: 97.6 F | RESPIRATION RATE: 18 BRPM | DIASTOLIC BLOOD PRESSURE: 74 MMHG | WEIGHT: 284.17 LBS | BODY MASS INDEX: 40.68 KG/M2

## 2023-05-17 LAB
GLUCOSE BLD-MCNC: 138 MG/DL (ref 70–99)
PERFORMED ON: ABNORMAL

## 2023-05-17 PROCEDURE — 2580000003 HC RX 258: Performed by: PHYSICAL MEDICINE & REHABILITATION

## 2023-05-17 PROCEDURE — 6360000002 HC RX W HCPCS: Performed by: PHYSICAL MEDICINE & REHABILITATION

## 2023-05-17 PROCEDURE — 6370000000 HC RX 637 (ALT 250 FOR IP): Performed by: PHYSICAL MEDICINE & REHABILITATION

## 2023-05-17 PROCEDURE — 94760 N-INVAS EAR/PLS OXIMETRY 1: CPT

## 2023-05-17 RX ADMIN — SODIUM CHLORIDE 25 ML: 9 INJECTION, SOLUTION INTRAVENOUS at 09:07

## 2023-05-17 RX ADMIN — PANTOPRAZOLE SODIUM 40 MG: 40 TABLET, DELAYED RELEASE ORAL at 05:46

## 2023-05-17 RX ADMIN — SODIUM CHLORIDE, PRESERVATIVE FREE 10 ML: 5 INJECTION INTRAVENOUS at 08:59

## 2023-05-17 RX ADMIN — Medication 400 MG: at 08:53

## 2023-05-17 RX ADMIN — THERA TABS 1 TABLET: TAB at 08:54

## 2023-05-17 RX ADMIN — MICONAZOLE NITRATE: 2 POWDER TOPICAL at 09:00

## 2023-05-17 RX ADMIN — AMPICILLIN SODIUM 2000 MG: 2 INJECTION, POWDER, FOR SOLUTION INTRAMUSCULAR; INTRAVENOUS at 00:33

## 2023-05-17 RX ADMIN — SENNOSIDES AND DOCUSATE SODIUM 1 TABLET: 50; 8.6 TABLET ORAL at 08:53

## 2023-05-17 RX ADMIN — AMPICILLIN SODIUM 2000 MG: 2 INJECTION, POWDER, FOR SOLUTION INTRAMUSCULAR; INTRAVENOUS at 04:39

## 2023-05-17 RX ADMIN — ALLOPURINOL 600 MG: 300 TABLET ORAL at 08:51

## 2023-05-17 RX ADMIN — FLUOXETINE 60 MG: 20 CAPSULE ORAL at 08:52

## 2023-05-17 RX ADMIN — AMPICILLIN SODIUM 2000 MG: 2 INJECTION, POWDER, FOR SOLUTION INTRAMUSCULAR; INTRAVENOUS at 09:08

## 2023-05-17 RX ADMIN — TORSEMIDE 40 MG: 20 TABLET ORAL at 08:53

## 2023-05-17 RX ADMIN — Medication 1000 UNITS: at 08:54

## 2023-05-17 RX ADMIN — ASPIRIN 81 MG 324 MG: 81 TABLET ORAL at 08:50

## 2023-05-17 RX ADMIN — METOPROLOL TARTRATE 12.5 MG: 25 TABLET, FILM COATED ORAL at 08:54

## 2023-05-17 RX ADMIN — ACETAMINOPHEN 1000 MG: 500 TABLET ORAL at 05:05

## 2023-05-17 RX ADMIN — ENOXAPARIN SODIUM 40 MG: 100 INJECTION SUBCUTANEOUS at 08:57

## 2023-05-17 RX ADMIN — GUAIFENESIN 600 MG: 600 TABLET ORAL at 08:54

## 2023-05-17 RX ADMIN — POTASSIUM CHLORIDE 40 MEQ: 1500 TABLET, EXTENDED RELEASE ORAL at 08:52

## 2023-05-17 RX ADMIN — OXYCODONE HYDROCHLORIDE 10 MG: 10 TABLET ORAL at 08:48

## 2023-05-17 ASSESSMENT — PAIN DESCRIPTION - DESCRIPTORS
DESCRIPTORS: ACHING
DESCRIPTORS: OTHER (COMMENT)

## 2023-05-17 ASSESSMENT — PAIN SCALES - GENERAL
PAINLEVEL_OUTOF10: 0
PAINLEVEL_OUTOF10: 0
PAINLEVEL_OUTOF10: 7
PAINLEVEL_OUTOF10: 0

## 2023-05-17 ASSESSMENT — PAIN DESCRIPTION - FREQUENCY: FREQUENCY: INTERMITTENT

## 2023-05-17 ASSESSMENT — PAIN DESCRIPTION - ORIENTATION
ORIENTATION: MID;UPPER
ORIENTATION: MID;UPPER

## 2023-05-17 ASSESSMENT — PAIN DESCRIPTION - ONSET: ONSET: ON-GOING

## 2023-05-17 ASSESSMENT — PAIN DESCRIPTION - LOCATION
LOCATION: CHEST
LOCATION: CHEST

## 2023-05-17 ASSESSMENT — PAIN DESCRIPTION - PAIN TYPE: TYPE: ACUTE PAIN

## 2023-05-17 ASSESSMENT — PAIN SCALES - WONG BAKER: WONGBAKER_NUMERICALRESPONSE: 0

## 2023-05-17 NOTE — DISCHARGE SUMMARY
overall modified independent to min assist for mobility and ADLs. Discharging home with spouse. Home care services and DME ordered as below. Patient will follow-up with PCP, [facility]. Impairments:  generalized weakness, decreased balance, endurance, cognition, dysphagia    Medical Management:    Prosthetic aortic valve endocarditis, enterococcus bacteremia  -s/p sternotomy, explant and AVR, debridement of aortic root (4/19 with Dr. Erlinda Schwartz). -Wound care per CT Surgery  -Sternal precautions  -IV antibiotics per ID: ampicillin + ceftriaxone (thru 5/31)  ----Weekly labs to [facility] ID (Dr. Richard Larsen). -PT/OT     Chest pain (\"burning\")  -Suspect this was related to his sternal incision. Description not consistent with ACS  -EKG seems stable when compared to prior reports on care everywhere  -Troponin trend flat     Complete heart block  -s/p PPM (4/25 with Dr. Lance Arauz)  -LUE precautions     Metabolic Encephalopathy with baseline cognitive impairment  -Regulate sleep/wake. Emphasis on routine.   -SLP. Dysphagia   -Dietitian and SLP consults.   -Currently on regular + thins     Acute blood loss anemia   -Post-surgical.   -Hgb trending up >8      Chronic dCHF  -torsemide (increased dose due to increased wt and edema-- now improving), bb  -Daily wt      Hypokalemia/hypomagnesemia  -Increased supplements     CAD  -ASA, statin, bb     DM2 with neuropathy  -Changed to lantus 10 units qhs, ISS, holding prandial due to intermittent hypoglycemia      Depression  -fluoxetine     Gout  -allopurinol     Acute on chronic pain syndrome, spinal stenosis  -scheduled acetaminophen, lidoderm, prn oxycodone  -Will resume home pain medication regimen at discharge and f/u with Dr. Cielo Cortez. Morbid Obesity   -Complicating assessment and treatment. Placing patient at risk for multiple co-morbidities as well as early death and contributing to the patient's presentation.   -Dietician consult. Counseling and education.      Green urine --

## 2023-05-17 NOTE — PLAN OF CARE
Problem: Discharge Planning  Goal: Discharge to home or other facility with appropriate resources  5/10/2023 0021 by Nakita Rivers RN  Outcome: Progressing     Problem: Pain  Goal: Verbalizes/displays adequate comfort level or baseline comfort level  5/10/2023 0021 by Nakita Rivers RN  Outcome: Progressing     Problem: Skin/Tissue Integrity  Goal: Absence of new skin breakdown  Description: 1. Monitor for areas of redness and/or skin breakdown  2. Assess vascular access sites hourly  3. Every 4-6 hours minimum:  Change oxygen saturation probe site  4. Every 4-6 hours:  If on nasal continuous positive airway pressure, respiratory therapy assess nares and determine need for appliance change or resting period.   5/10/2023 0021 by Nakita Rivers RN  Outcome: Progressing     Problem: ABCDS Injury Assessment  Goal: Absence of physical injury  5/10/2023 0021 by Nakita Rivers RN  Outcome: Progressing  Flowsheets (Taken 5/9/2023 1741 by Kimberly Maher LPN)  Absence of Physical Injury: Implement safety measures based on patient assessment     Problem: Safety - Adult  Goal: Free from fall injury  5/10/2023 0021 by Nakita Rivers RN  Outcome: Progressing  Flowsheets (Taken 5/9/2023 1741 by Kimberly Maher LPN)  Free From Fall Injury: Instruct family/caregiver on patient safety
Problem: Discharge Planning  Goal: Discharge to home or other facility with appropriate resources  5/17/2023 0943 by Kika Segura RN  Outcome: Progressing  Flowsheets (Taken 5/17/2023 0848)  Discharge to home or other facility with appropriate resources:   Identify barriers to discharge with patient and caregiver   Arrange for needed discharge resources and transportation as appropriate   Identify discharge learning needs (meds, wound care, etc)   Refer to discharge planning if patient needs post-hospital services based on physician order or complex needs related to functional status, cognitive ability or social support system     Problem: Pain  Goal: Verbalizes/displays adequate comfort level or baseline comfort level  5/17/2023 0943 by Kika Segura RN  Outcome: Progressing  Flowsheets (Taken 5/17/2023 0848)  Verbalizes/displays adequate comfort level or baseline comfort level:   Encourage patient to monitor pain and request assistance   Assess pain using appropriate pain scale   Administer analgesics based on type and severity of pain and evaluate response   Implement non-pharmacological measures as appropriate and evaluate response     Problem: Skin/Tissue Integrity  Goal: Absence of new skin breakdown  Description: 1. Monitor for areas of redness and/or skin breakdown  2. Assess vascular access sites hourly  3. Every 4-6 hours minimum:  Change oxygen saturation probe site  4. Every 4-6 hours:  If on nasal continuous positive airway pressure, respiratory therapy assess nares and determine need for appliance change or resting period.   5/17/2023 0943 by Kika Segura RN  Outcome: Progressing     Problem: ABCDS Injury Assessment  Goal: Absence of physical injury  5/17/2023 0943 by Kika Segura RN  Outcome: Progressing  Flowsheets (Taken 5/17/2023 0941)  Absence of Physical Injury: Implement safety measures based on patient assessment     Problem: Safety - Adult  Goal: Free from fall
Problem: Discharge Planning  Goal: Discharge to home or other facility with appropriate resources  5/2/2023 0233 by Linda Rolon RN  Outcome: Progressing  5/1/2023 1603 by Trae Kelly RN  Outcome: Progressing  Flowsheets (Taken 5/1/2023 0730)  Discharge to home or other facility with appropriate resources:   Arrange for needed discharge resources and transportation as appropriate   Identify barriers to discharge with patient and caregiver   Identify discharge learning needs (meds, wound care, etc)   Refer to discharge planning if patient needs post-hospital services based on physician order or complex needs related to functional status, cognitive ability or social support system     Problem: Pain  Goal: Verbalizes/displays adequate comfort level or baseline comfort level  5/2/2023 0233 by Linda Rolon RN  Outcome: Progressing  Flowsheets (Taken 5/1/2023 1718 by Trae Kelly RN)  Verbalizes/displays adequate comfort level or baseline comfort level:   Encourage patient to monitor pain and request assistance   Assess pain using appropriate pain scale   Administer analgesics based on type and severity of pain and evaluate response   Implement non-pharmacological measures as appropriate and evaluate response  5/1/2023 1603 by Trae Kelly RN  Outcome: Progressing  Flowsheets (Taken 5/1/2023 0730)  Verbalizes/displays adequate comfort level or baseline comfort level:   Encourage patient to monitor pain and request assistance   Assess pain using appropriate pain scale   Administer analgesics based on type and severity of pain and evaluate response   Implement non-pharmacological measures as appropriate and evaluate response     Problem: Skin/Tissue Integrity  Goal: Absence of new skin breakdown  Description: 1. Monitor for areas of redness and/or skin breakdown  2. Assess vascular access sites hourly  3. Every 4-6 hours minimum:  Change oxygen saturation probe site  4.   Every 4-6 hours:  If on nasal
Problem: Discharge Planning  Goal: Discharge to home or other facility with appropriate resources  5/2/2023 1033 by Sravan Quintero LPN  Outcome: Progressing  Flowsheets (Taken 5/2/2023 1033)  Discharge to home or other facility with appropriate resources:   Arrange for needed discharge resources and transportation as appropriate   Identify discharge learning needs (meds, wound care, etc)   Refer to discharge planning if patient needs post-hospital services based on physician order or complex needs related to functional status, cognitive ability or social support system     Problem: Pain  Goal: Verbalizes/displays adequate comfort level or baseline comfort level  5/2/2023 1033 by Sravan Quintero LPN  Outcome: Progressing  Flowsheets (Taken 5/2/2023 1033)  Verbalizes/displays adequate comfort level or baseline comfort level:   Encourage patient to monitor pain and request assistance   Assess pain using appropriate pain scale   Administer analgesics based on type and severity of pain and evaluate response   Implement non-pharmacological measures as appropriate and evaluate response     Problem: Skin/Tissue Integrity  Goal: Absence of new skin breakdown  Description: 1. Monitor for areas of redness and/or skin breakdown  2. Assess vascular access sites hourly  3. Every 4-6 hours minimum:  Change oxygen saturation probe site  4. Every 4-6 hours:  If on nasal continuous positive airway pressure, respiratory therapy assess nares and determine need for appliance change or resting period.   5/2/2023 1033 by Sravan Quintero LPN  Outcome: Progressing     Problem: ABCDS Injury Assessment  Goal: Absence of physical injury  5/2/2023 1033 by Sravan Quintero LPN  Outcome: Progressing  Flowsheets (Taken 5/2/2023 1033)  Absence of Physical Injury: Implement safety measures based on patient assessment     Problem: Safety - Adult  Goal: Free from fall injury  5/2/2023 1033 by Sravan Quintero LPN  Outcome: Progressing  Flowsheets (Taken 5/2/2023
Problem: Discharge Planning  Goal: Discharge to home or other facility with appropriate resources  5/9/2023 1655 by Natan Suazo LPN  Outcome: Progressing     Problem: Pain  Goal: Verbalizes/displays adequate comfort level or baseline comfort level  5/9/2023 1655 by Natan Suazo LPN  Outcome: Progressing     Problem: Skin/Tissue Integrity  Goal: Absence of new skin breakdown  Description: 1. Monitor for areas of redness and/or skin breakdown  2. Assess vascular access sites hourly  3. Every 4-6 hours minimum:  Change oxygen saturation probe site  4. Every 4-6 hours:  If on nasal continuous positive airway pressure, respiratory therapy assess nares and determine need for appliance change or resting period.   5/9/2023 1655 by Natan Suazo LPN  Outcome: Progressing     Problem: ABCDS Injury Assessment  Goal: Absence of physical injury  5/9/2023 1655 by Natan Suazo LPN  Outcome: Progressing     Problem: Safety - Adult  Goal: Free from fall injury  5/9/2023 1655 by Natan Suazo LPN  Outcome: Progressing     Problem: Neurosensory - Adult  Goal: Achieves stable or improved neurological status  5/9/2023 1655 by Natan Suazo LPN  Outcome: Progressing  Flowsheets (Taken 5/9/2023 0900)  Achieves stable or improved neurological status: Assess for and report changes in neurological status     Problem: Neurosensory - Adult  Goal: Achieves maximal functionality and self care  5/9/2023 1655 by Natan Suazo LPN  Outcome: Progressing     Problem: Respiratory - Adult  Goal: Achieves optimal ventilation and oxygenation  5/9/2023 1655 by Natan Suazo LPN  Outcome: Progressing     Problem: Cardiovascular - Adult  Goal: Maintains optimal cardiac output and hemodynamic stability  5/9/2023 1655 by Natan Suazo LPN  Outcome: Progressing     Problem: Skin/Tissue Integrity - Adult  Goal: Skin integrity remains intact  5/9/2023 1655 by Natan Suazo LPN  Outcome: Progressing     Problem: Skin/Tissue Integrity
Problem: Discharge Planning  Goal: Discharge to home or other facility with appropriate resources  Outcome: Progressing     Problem: Pain  Goal: Verbalizes/displays adequate comfort level or baseline comfort level  Outcome: Progressing     Problem: Skin/Tissue Integrity  Goal: Absence of new skin breakdown  Description: 1. Monitor for areas of redness and/or skin breakdown  2. Assess vascular access sites hourly  3. Every 4-6 hours minimum:  Change oxygen saturation probe site  4. Every 4-6 hours:  If on nasal continuous positive airway pressure, respiratory therapy assess nares and determine need for appliance change or resting period.   Outcome: Progressing     Problem: ABCDS Injury Assessment  Goal: Absence of physical injury  Outcome: Progressing     Problem: Safety - Adult  Goal: Free from fall injury  Outcome: Progressing     Problem: Neurosensory - Adult  Goal: Achieves stable or improved neurological status  Outcome: Progressing  Goal: Absence of seizures  Outcome: Progressing  Goal: Remains free of injury related to seizures activity  Outcome: Progressing  Goal: Achieves maximal functionality and self care  Outcome: Progressing     Problem: Respiratory - Adult  Goal: Achieves optimal ventilation and oxygenation  Outcome: Progressing     Problem: Cardiovascular - Adult  Goal: Maintains optimal cardiac output and hemodynamic stability  Outcome: Progressing  Goal: Absence of cardiac dysrhythmias or at baseline  Outcome: Progressing     Problem: Skin/Tissue Integrity - Adult  Goal: Skin integrity remains intact  Outcome: Progressing  Goal: Incisions, wounds, or drain sites healing without S/S of infection  Outcome: Progressing  Goal: Oral mucous membranes remain intact  Outcome: Progressing     Problem: Musculoskeletal - Adult  Goal: Return mobility to safest level of function  Outcome: Progressing  Goal: Maintain proper alignment of affected body part  Outcome: Progressing  Goal: Return ADL status to a
Problem: Discharge Planning  Goal: Discharge to home or other facility with appropriate resources  Outcome: Progressing  Flowsheets  Taken 5/10/2023 2007 by Eligio Walters RN  Discharge to home or other facility with appropriate resources: Identify barriers to discharge with patient and caregiver  Taken 5/10/2023 1130 by Jl Real LPN  Discharge to home or other facility with appropriate resources: Identify barriers to discharge with patient and caregiver     Problem: Pain  Goal: Verbalizes/displays adequate comfort level or baseline comfort level  Outcome: Progressing
Problem: Discharge Planning  Goal: Discharge to home or other facility with appropriate resources  Outcome: Progressing  Flowsheets (Taken 5/1/2023 0730)  Discharge to home or other facility with appropriate resources:   Arrange for needed discharge resources and transportation as appropriate   Identify barriers to discharge with patient and caregiver   Identify discharge learning needs (meds, wound care, etc)   Refer to discharge planning if patient needs post-hospital services based on physician order or complex needs related to functional status, cognitive ability or social support system     Problem: Pain  Goal: Verbalizes/displays adequate comfort level or baseline comfort level  5/1/2023 1603 by Jj Guillen RN  Outcome: Progressing  Flowsheets (Taken 5/1/2023 0730)  Verbalizes/displays adequate comfort level or baseline comfort level:   Encourage patient to monitor pain and request assistance   Assess pain using appropriate pain scale   Administer analgesics based on type and severity of pain and evaluate response   Implement non-pharmacological measures as appropriate and evaluate response     Problem: Skin/Tissue Integrity  Goal: Absence of new skin breakdown  Description: 1. Monitor for areas of redness and/or skin breakdown  2. Assess vascular access sites hourly  3. Every 4-6 hours minimum:  Change oxygen saturation probe site  4. Every 4-6 hours:  If on nasal continuous positive airway pressure, respiratory therapy assess nares and determine need for appliance change or resting period.   5/1/2023 1603 by Jj Guillen RN  Outcome: Progressing     Problem: ABCDS Injury Assessment  Goal: Absence of physical injury  5/1/2023 1603 by Jj Guillen RN  Outcome: Progressing  Flowsheets (Taken 5/1/2023 1600)  Absence of Physical Injury: Implement safety measures based on patient assessment     Problem: Safety - Adult  Goal: Free from fall injury  5/1/2023 1603 by Jj Guillen RN  Outcome:
Problem: Discharge Planning  Goal: Discharge to home or other facility with appropriate resources  Outcome: Progressing  Flowsheets (Taken 5/15/2023 0948)  Discharge to home or other facility with appropriate resources:   Identify barriers to discharge with patient and caregiver   Arrange for needed discharge resources and transportation as appropriate   Identify discharge learning needs (meds, wound care, etc)     Problem: Pain  Goal: Verbalizes/displays adequate comfort level or baseline comfort level  Outcome: Progressing     Problem: Skin/Tissue Integrity  Goal: Absence of new skin breakdown  Description: 1. Monitor for areas of redness and/or skin breakdown  2. Assess vascular access sites hourly  3. Every 4-6 hours minimum:  Change oxygen saturation probe site  4. Every 4-6 hours:  If on nasal continuous positive airway pressure, respiratory therapy assess nares and determine need for appliance change or resting period.   5/15/2023 0954 by Nehemiah Jeter RN  Outcome: Progressing  5/14/2023 2350 by Deb Sawyer RN  Outcome: Progressing     Problem: ABCDS Injury Assessment  Goal: Absence of physical injury  5/15/2023 0954 by Nehemiah Jeter RN  Outcome: Progressing  5/14/2023 2350 by Deb Sawyer RN  Outcome: Progressing     Problem: Safety - Adult  Goal: Free from fall injury  5/15/2023 0954 by Nehemiah Jeter RN  Outcome: Progressing  5/14/2023 2350 by Deb Sawyer RN  Outcome: Progressing     Problem: Neurosensory - Adult  Goal: Achieves stable or improved neurological status  Outcome: Progressing  Flowsheets (Taken 5/15/2023 0948)  Achieves stable or improved neurological status:   Assess for and report changes in neurological status   Initiate measures to prevent increased intracranial pressure   Maintain blood pressure and fluid volume within ordered parameters to optimize cerebral perfusion and minimize risk of hemorrhage  Goal: Achieves maximal functionality and self care  Recent
Problem: Discharge Planning  Goal: Discharge to home or other facility with appropriate resources  Outcome: Progressing  Flowsheets (Taken 5/5/2023 1711)  Discharge to home or other facility with appropriate resources:   Identify barriers to discharge with patient and caregiver   Identify discharge learning needs (meds, wound care, etc)     Problem: Pain  Goal: Verbalizes/displays adequate comfort level or baseline comfort level  Outcome: Progressing  Flowsheets (Taken 5/5/2023 1712)  Verbalizes/displays adequate comfort level or baseline comfort level:   Encourage patient to monitor pain and request assistance   Assess pain using appropriate pain scale   Administer analgesics based on type and severity of pain and evaluate response   Implement non-pharmacological measures as appropriate and evaluate response   Consider cultural and social influences on pain and pain management   Notify Licensed Independent Practitioner if interventions unsuccessful or patient reports new pain     Problem: Skin/Tissue Integrity  Goal: Absence of new skin breakdown  Description: 1. Monitor for areas of redness and/or skin breakdown  2. Assess vascular access sites hourly  3. Every 4-6 hours minimum:  Change oxygen saturation probe site  4. Every 4-6 hours:  If on nasal continuous positive airway pressure, respiratory therapy assess nares and determine need for appliance change or resting period. Outcome: Progressing  Note: Pt is at risk for impaired skin integrity. Assess skin every shift and prn. Turn every 2 hours. Keep heels off bed. Keep skin clean and dry. Wound care saw him today.   [place]     Problem: ABCDS Injury Assessment  Goal: Absence of physical injury  Outcome: Progressing  Flowsheets (Taken 5/5/2023 1712)  Absence of Physical Injury: Implement safety measures based on patient assessment     Problem: Safety - Adult  Goal: Free from fall injury  Outcome: Progressing  Flowsheets (Taken 5/5/2023 1712)  Free From
Problem: Pain  Goal: Verbalizes/displays adequate comfort level or baseline comfort level  5/12/2023 0127 by Rupal De La Torre RN  Outcome: Progressing     Problem: Skin/Tissue Integrity  Goal: Absence of new skin breakdown  Description: 1. Monitor for areas of redness and/or skin breakdown  2. Assess vascular access sites hourly  3. Every 4-6 hours minimum:  Change oxygen saturation probe site  4. Every 4-6 hours:  If on nasal continuous positive airway pressure, respiratory therapy assess nares and determine need for appliance change or resting period. 5/12/2023 0127 by Rupal De La Torre RN  Outcome: Progressing  Note: Skin assessment completed on admission and every shift. Barrier wipes used in the event of incontinence. Pressure relief techniques used as needed while in chair and in bed. Position changes encouraged at least every two hours while in bed.         Problem: ABCDS Injury Assessment  Goal: Absence of physical injury  5/12/2023 0127 by Rupal De La Torre RN  Outcome: Progressing     Problem: Skin/Tissue Integrity - Adult  Goal: Skin integrity remains intact  Recent Flowsheet Documentation  Taken 5/11/2023 4311 by Rupal De La Torre RN  Skin Integrity Remains Intact: Monitor for areas of redness and/or skin breakdown
Problem: Pain  Goal: Verbalizes/displays adequate comfort level or baseline comfort level  5/13/2023 1336 by Teresa Velazco RN  Outcome: Progressing  Flowsheets (Taken 5/11/2023 0409 by Jen López RN)  Verbalizes/displays adequate comfort level or baseline comfort level: Encourage patient to monitor pain and request assistance  5/13/2023 0225 by Burak Farrell RN  Outcome: Progressing     Problem: Skin/Tissue Integrity  Goal: Absence of new skin breakdown  Description: 1. Monitor for areas of redness and/or skin breakdown  2. Assess vascular access sites hourly  3. Every 4-6 hours minimum:  Change oxygen saturation probe site  4. Every 4-6 hours:  If on nasal continuous positive airway pressure, respiratory therapy assess nares and determine need for appliance change or resting period. 5/13/2023 1336 by Teresa Velazco RN  Outcome: Progressing  Note: Assessment completed. No sign or symptoms of infection noted. Will continue to monitor.    5/13/2023 0225 by Burak Farrell RN  Outcome: Progressing
Problem: Pain  Goal: Verbalizes/displays adequate comfort level or baseline comfort level  5/16/2023 0112 by Dewayne Cohn RN  Outcome: Progressing  Flowsheets (Taken 5/15/2023 1301 by Melva Sam RN)  Verbalizes/displays adequate comfort level or baseline comfort level:   Encourage patient to monitor pain and request assistance   Administer analgesics based on type and severity of pain and evaluate response   Assess pain using appropriate pain scale   Implement non-pharmacological measures as appropriate and evaluate response
Problem: Pain  Goal: Verbalizes/displays adequate comfort level or baseline comfort level  5/8/2023 1516 by Meg Yeung, LPN  Outcome: Progressing  Flowsheets (Taken 5/8/2023 1516)  Verbalizes/displays adequate comfort level or baseline comfort level:   Encourage patient to monitor pain and request assistance   Assess pain using appropriate pain scale   Implement non-pharmacological measures as appropriate and evaluate response     Problem: Pain  Goal: Verbalizes/displays adequate comfort level or baseline comfort level  5/8/2023 1516 by Meg Yeung, LPN  Outcome: Progressing  Flowsheets (Taken 5/8/2023 1516)  Verbalizes/displays adequate comfort level or baseline comfort level:   Encourage patient to monitor pain and request assistance   Assess pain using appropriate pain scale   Implement non-pharmacological measures as appropriate and evaluate response     Problem: Skin/Tissue Integrity  Goal: Absence of new skin breakdown  Description: 1. Monitor for areas of redness and/or skin breakdown  2. Assess vascular access sites hourly  3. Every 4-6 hours minimum:  Change oxygen saturation probe site  4. Every 4-6 hours:  If on nasal continuous positive airway pressure, respiratory therapy assess nares and determine need for appliance change or resting period. 5/8/2023 1516 by Meg Yeung, LPN  Outcome: Progressing  Note: Monitoring incision site for signs and symptoms of infection, assessing incision site and making sure it is approximated, dry, intact and will continue to monitor. Patient has redness on Groin and Micatin has been applied and will continue to monitor.       Problem: Safety - Adult  Goal: Free from fall injury  Recent Flowsheet Documentation  Taken 5/8/2023 1419 by Meg Yeung LPN  Free From Fall Injury: Instruct family/caregiver on patient safety
Problem: Pain  Goal: Verbalizes/displays adequate comfort level or baseline comfort level  5/9/2023 0432 by Zahira Concepcion RN  Outcome: Progressing  Verbalizes/displays adequate comfort level or baseline comfort level: Encourage patient to monitor pain and request assistance     Problem: Skin/Tissue Integrity  Goal: Absence of new skin breakdown  Description: 1. Monitor for areas of redness and/or skin breakdown  2. Assess vascular access sites hourly  3. Every 4-6 hours minimum:  Change oxygen saturation probe site  4. Every 4-6 hours:  If on nasal continuous positive airway pressure, respiratory therapy assess nares and determine need for appliance change or resting period. 5/9/2023 0432 by Zahira Concepcion RN  Outcome: Progressing  Note: Patient turn and reposition self. Lotion applied, skin assessed every shift.
Problem: Pain  Goal: Verbalizes/displays adequate comfort level or baseline comfort level  Flowsheets (Taken 5/6/2023 1206)  Verbalizes/displays adequate comfort level or baseline comfort level:   Encourage patient to monitor pain and request assistance   Assess pain using appropriate pain scale  Note: Denies pain at this time     Problem: Skin/Tissue Integrity  Goal: Absence of new skin breakdown  Description: 1. Monitor for areas of redness and/or skin breakdown  2. Assess vascular access sites hourly  3. Every 4-6 hours minimum:  Change oxygen saturation probe site  4. Every 4-6 hours:  If on nasal continuous positive airway pressure, respiratory therapy assess nares and determine need for appliance change or resting period. Note: Skin assessment completed on admission and every shift. Barrier wipes used in the event of incontinence. Pressure relief techniques used as needed while in chair and in bed. Position changes encouraged at least every two hours while in bed. Problem: Safety - Adult  Goal: Free from fall injury  Note: Fall risk band on patient. Orange light on outside of room. Non skid footwear in place. Alarms used appropriately. Patient instructed to call and wait for staff before getting up. Rounding done to anticipate needs. Appropriate safety devices used for transfers.       Problem: Skin/Tissue Integrity - Adult  Goal: Skin integrity remains intact  Recent Flowsheet Documentation  Taken 5/6/2023 0338 by Ruth Hardwick RN  Skin Integrity Remains Intact: Monitor for areas of redness and/or skin breakdown  Goal: Incisions, wounds, or drain sites healing without S/S of infection  Recent Flowsheet Documentation  Taken 5/6/2023 0338 by Ruth Hardwick RN  Incisions, Wounds, or Drain Sites Healing Without Sign and Symptoms of Infection: Implement wound care per orders  Goal: Oral mucous membranes remain intact  Recent Flowsheet Documentation  Taken 5/6/2023 0338 by Ruth Hardwick RN  Oral
Problem: Pain  Goal: Verbalizes/displays adequate comfort level or baseline comfort level  Outcome: Progressing     Problem: Skin/Tissue Integrity  Goal: Absence of new skin breakdown  Description: 1. Monitor for areas of redness and/or skin breakdown  2. Assess vascular access sites hourly  3. Every 4-6 hours minimum:  Change oxygen saturation probe site  4. Every 4-6 hours:  If on nasal continuous positive airway pressure, respiratory therapy assess nares and determine need for appliance change or resting period. Outcome: Progressing  Note: Skin assessment completed on admission and every shift. Barrier wipes used in the event of incontinence. Pressure relief techniques used as needed while in chair and in bed. Position changes encouraged at least every two hours while in bed. Problem: ABCDS Injury Assessment  Goal: Absence of physical injury  Outcome: Progressing     Problem: Safety - Adult  Goal: Free from fall injury  Outcome: Progressing  Note: Fall risk band on patient. Orange light on outside of room. Non skid footwear in place. Alarms used appropriately. Patient instructed to call and wait for staff before getting up. Rounding done to anticipate needs. Appropriate safety devices used for transfers.
Problem: Pain  Goal: Verbalizes/displays adequate comfort level or baseline comfort level  Outcome: Progressing     Problem: Skin/Tissue Integrity  Goal: Absence of new skin breakdown  Description: 1. Monitor for areas of redness and/or skin breakdown  2. Assess vascular access sites hourly  3. Every 4-6 hours minimum:  Change oxygen saturation probe site  4. Every 4-6 hours:  If on nasal continuous positive airway pressure, respiratory therapy assess nares and determine need for appliance change or resting period. Outcome: Progressing  Note: Skin assessment completed on admission and every shift. Barrier wipes used in the event of incontinence. Pressure relief techniques used as needed while in chair and in bed. Position changes encouraged at least every two hours while in bed. Problem: ABCDS Injury Assessment  Goal: Absence of physical injury  Outcome: Progressing     Problem: Safety - Adult  Goal: Free from fall injury  Outcome: Progressing  Note: Fall risk band on patient. Orange light on outside of room. Non skid footwear in place. Alarms used appropriately. Patient instructed to call and wait for staff before getting up. Rounding done to anticipate needs. Appropriate safety devices used for transfers.
Problem: Pain  Goal: Verbalizes/displays adequate comfort level or baseline comfort level  Outcome: Progressing     Problem: Skin/Tissue Integrity  Goal: Absence of new skin breakdown  Description: 1. Monitor for areas of redness and/or skin breakdown  2. Assess vascular access sites hourly  3. Every 4-6 hours minimum:  Change oxygen saturation probe site  4. Every 4-6 hours:  If on nasal continuous positive airway pressure, respiratory therapy assess nares and determine need for appliance change or resting period. Outcome: Progressing  Note: Skin assessment completed on admission and every shift. Barrier wipes used in the event of incontinence. Pressure relief techniques used as needed while in chair and in bed. Position changes encouraged at least every two hours while in bed. Problem: Safety - Adult  Goal: Free from fall injury  Outcome: Progressing  Note: Fall risk band on patient. Orange light on outside of room. Non skid footwear in place. Alarms used appropriately. Patient instructed to call and wait for staff before getting up. Rounding done to anticipate needs. Appropriate safety devices used for transfers.        Problem: Respiratory - Adult  Goal: Achieves optimal ventilation and oxygenation  Outcome: Progressing  Flowsheets (Taken 5/2/2023 2000)  Achieves optimal ventilation and oxygenation:   Assess for changes in respiratory status   Assess for changes in mentation and behavior     Problem: Skin/Tissue Integrity - Adult  Goal: Skin integrity remains intact  Outcome: Progressing  Flowsheets (Taken 5/3/2023 0042)  Skin Integrity Remains Intact: Monitor for areas of redness and/or skin breakdown     Problem: Skin/Tissue Integrity - Adult  Goal: Incisions, wounds, or drain sites healing without S/S of infection  Outcome: Progressing  Flowsheets (Taken 5/3/2023 0042)  Incisions, Wounds, or Drain Sites Healing Without Sign and Symptoms of Infection:   TWICE DAILY: Assess and document
Problem: Pain  Goal: Verbalizes/displays adequate comfort level or baseline comfort level  Outcome: Progressing  Flowsheets (Taken 5/15/2023 1301 by Sha Tai RN)  Verbalizes/displays adequate comfort level or baseline comfort level:   Encourage patient to monitor pain and request assistance   Administer analgesics based on type and severity of pain and evaluate response   Assess pain using appropriate pain scale   Implement non-pharmacological measures as appropriate and evaluate response     Problem: Skin/Tissue Integrity  Goal: Absence of new skin breakdown  Description: 1. Monitor for areas of redness and/or skin breakdown  2. Assess vascular access sites hourly  3. Every 4-6 hours minimum:  Change oxygen saturation probe site  4. Every 4-6 hours:  If on nasal continuous positive airway pressure, respiratory therapy assess nares and determine need for appliance change or resting period. Outcome: Progressing  Note: Skin assessment completed on admission and every shift. Barrier wipes used in the event of incontinence. Pressure relief techniques used as needed while in chair and in bed. Position changes encouraged at least every two hours while in bed. Pt continent through the night. Pt able to turn self. Micatin powder appled to skin folds. Problem: Safety - Adult  Goal: Free from fall injury  Outcome: Progressing  Note: Fall risk band on patient. Orange light on outside of room. Non skid footwear in place. Alarms used appropriately. Patient instructed to call and wait for staff before getting up. Rounding done to anticipate needs. Appropriate safety devices used for transfers. Pt has good safety awareness. Pt calls for assistance before getting up. Bed alarm on, call light in reach.       Problem: Musculoskeletal - Adult  Goal: Return mobility to safest level of function  Outcome: Progressing  Flowsheets (Taken 5/15/2023 2100)  Return Mobility to Safest Level of Function:   Assess patient
Problem: Pain  Goal: Verbalizes/displays adequate comfort level or baseline comfort level  Recent Flowsheet Documentation  Taken 5/11/2023 0409 by Ronnie Senior RN  Verbalizes/displays adequate comfort level or baseline comfort level: Encourage patient to monitor pain and request assistance  5/10/2023 2134 by Ronnie Senior RN  Outcome: Progressing     Problem: ABCDS Injury Assessment  Goal: Absence of physical injury  Recent Flowsheet Documentation  Taken 5/10/2023 2008 by Ronnie Senior RN  Absence of Physical Injury: Implement safety measures based on patient assessment     Problem: Safety - Adult  Goal: Free from fall injury  Recent Flowsheet Documentation  Taken 5/10/2023 2008 by Ronnie Senior RN  Free From Fall Injury: Instruct family/caregiver on patient safety
Problem: Safety - Adult  Goal: Free from fall injury  5/4/2023 0321 by Noni Robb RN  Outcome: Progressing  Note: Fall risk band on patient. Orange light on outside of room. Non skid footwear in place. Alarms used appropriately. Patient instructed to call and wait for staff before getting up. Rounding done to anticipate needs. Appropriate safety devices used for transfers      Problem: Pain  Goal: Verbalizes/displays adequate comfort level or baseline comfort level  5/4/2023 0323 by Noni Robb RN  Outcome: Progressing  Note: Pain controlled with prn pain medication. Pt able to use 1-10 pain scale and understands prn pain medication schedule.
Problem: Safety - Adult  Goal: Free from fall injury  5/4/2023 0321 by Zeke Melvin RN  Outcome: Progressing  Note: Fall risk band on patient. Orange light on outside of room. Non skid footwear in place. Alarms used appropriately. Patient instructed to call and wait for staff before getting up. Rounding done to anticipate needs. Appropriate safety devices used for transfers.
Problem: Safety - Adult  Goal: Free from fall injury  Outcome: Progressing  Note: Fall risk band on patient. Orange light on outside of room. Non skid footwear in place. Alarms used appropriately. Patient instructed to call and wait for staff before getting up. Rounding done to anticipate needs. Appropriate safety devices used for transfers.
Problem: Skin/Tissue Integrity  Goal: Absence of new skin breakdown  Description: 1. Monitor for areas of redness and/or skin breakdown  2. Assess vascular access sites hourly  3. Every 4-6 hours minimum:  Change oxygen saturation probe site  4. Every 4-6 hours:  If on nasal continuous positive airway pressure, respiratory therapy assess nares and determine need for appliance change or resting period.   Outcome: Progressing     Problem: Safety - Adult  Goal: Free from fall injury  Outcome: Progressing     Problem: Neurosensory - Adult  Goal: Achieves maximal functionality and self care  Outcome: Progressing     Problem: Skin/Tissue Integrity - Adult  Goal: Skin integrity remains intact  Outcome: Progressing     Problem: Skin/Tissue Integrity - Adult  Goal: Incisions, wounds, or drain sites healing without S/S of infection  Outcome: Progressing     Problem: Musculoskeletal - Adult  Goal: Return mobility to safest level of function  Outcome: Progressing     Problem: Musculoskeletal - Adult  Goal: Return ADL status to a safe level of function  Outcome: Progressing     Problem: Gastrointestinal - Adult  Goal: Maintains adequate nutritional intake  Outcome: Progressing
Problem: Skin/Tissue Integrity  Goal: Absence of new skin breakdown  Description: 1. Monitor for areas of redness and/or skin breakdown  2. Assess vascular access sites hourly  3. Every 4-6 hours minimum:  Change oxygen saturation probe site  4. Every 4-6 hours:  If on nasal continuous positive airway pressure, respiratory therapy assess nares and determine need for appliance change or resting period. Outcome: Progressing  Note: Skin assessment completed on admission and every shift. Barrier wipes used in the event of incontinence. Pressure relief techniques used as needed while in chair and in bed. Position changes encouraged at least every two hours while in bed.
DAILY: Assess and document skin integrity   TWICE DAILY: Assess and document dressing/incision, wound bed, drain sites and surrounding tissue     Problem: Musculoskeletal - Adult  Goal: Return mobility to safest level of function  Outcome: Progressing  Flowsheets (Taken 5/3/2023 1410)  Return Mobility to Safest Level of Function:   Assess patient stability and activity tolerance for standing, transferring and ambulating with or without assistive devices   Assist with transfers and ambulation using safe patient handling equipment as needed     Problem: Musculoskeletal - Adult  Goal: Maintain proper alignment of affected body part  Outcome: Progressing  Flowsheets (Taken 5/3/2023 1410)  Maintain proper alignment of affected body part:   Support and protect limb and body alignment per provider's orders   Instruct and reinforce with patient and family use of appropriate assistive device and precautions (e.g. spinal or hip dislocation precautions)     Problem: Musculoskeletal - Adult  Goal: Return ADL status to a safe level of function  Outcome: Progressing  Flowsheets (Taken 5/3/2023 1410)  Return ADL Status to a Safe Level of Function:   Administer medication as ordered   Assess activities of daily living deficits and provide assistive devices as needed     Problem: Gastrointestinal - Adult  Goal: Maintains adequate nutritional intake  Outcome: Progressing  Flowsheets (Taken 5/3/2023 1410)  Maintains adequate nutritional intake: Monitor percentage of each meal consumed     Problem: Genitourinary - Adult  Goal: Absence of urinary retention  Outcome: Progressing  Flowsheets (Taken 5/3/2023 1410)  Absence of urinary retention: Assess patients ability to void and empty bladder     Problem: Infection - Adult  Goal: Absence of infection at discharge  Outcome: Progressing  Flowsheets (Taken 5/3/2023 1410)  Absence of infection at discharge:   Assess and monitor for signs and symptoms of infection   Monitor lab/diagnostic
minutes within a 7 day period). Treatments may include therapeutic exercises, gait training, neuromuscular re-ed, transfer training, community reintegration, bed mobility, w/c mobility and training, self care, home mgmt, cognitive training, energy conservation,dysphagia tx, speech/language/communication therapy, group therapy, and patient/family education. In addition, dietician/nutritionist may monitor calorie count as well as intake and collaboratively work with SLP on dietary upgrades. Neuropsychology/Psychology may evaluate and provide necessary support. Medical issues being managed closely and that require 24 hour availability of a physician:   [x] Swallowing Precautions  [x] Bowel/Bladder Fx  [x] Weight bearing precautions   [x] Wound Care    [x] Pain Mgmt   [x] Infection Protection   [x] DVT Prophylaxis   [x] Fall Precautions  [x] Fluid/Electrolyte/Nutrition Balance   [] Voice Protection   [] Respiratory  [] Other:    Medical Prognosis: [x] Good  [] Fair    [] Guarded   Total expected IRF days 3 weeks  Anticipated discharge destination:    [] Home Independently   [x] Home Modified Independent  [] Home with supervision    []SNF     [] Other                                           Physician anticipated functional outcomes:  João-Paola for mobility, ADLs, and cognitin   IPOC brief synthesis: Patient is an 82 yo M with pmh CHF, DM2, and severe AS s/p TAVR with Justin S3 valve (4/8052) complicated by endocarditis of tricuspid valve and enterococcus bacteremia (1/2023). He was treated with IV antibiotics x 6 weeks (thru 3/1/2023) but subsequently had recurrent enterococcus bacteremia. Additional work-up as done including TTE, ARMIN, and tagged WBC scan. 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  Therefore decision was made to admit to [facility] on 4/14/2023 and undergo aortic valve replacement and aortic

## 2023-05-17 NOTE — PROGRESS NOTES
1430 Patient up in chair resting comfortably. Denies pain at this time. Dr. Matteo Montes notified of test/lab results.
4 Eyes Skin Assessment     NAME:  An Duque  YOB: 1940  MEDICAL RECORD NUMBER:  [id number]    The patient is being assessed for  Admission    I agree that at least one RN has performed a thorough Head to Toe Skin Assessment on the patient. ALL assessment sites listed below have been assessed. Areas assessed by both nurses: [facility] NO 5, Face, Ears, Shoulders, Back, Chest, Arms, Elbows, Hands, Sacrum. Buttock, Coccyx, Ischium, and Legs. Feet and Heels        Does the Patient have a Wound?  No noted wound(s)       Shayne Prevention initiated by RN: No  Wound Care Orders initiated by RN: No    Pressure Injury (Stage 3,4, Unstageable, DTI, NWPT, and Complex wounds) if present, place Wound referral order by RN under : No    New Ostomies, if present place, Ostomy referral order under : No     Nurse 1 eSignature: Electronically signed by Ailyn Gauthier RN on 5/1/23 at 6:23 AM EDT    **SHARE this note so that the co-signing nurse can place an eSignature**    Nurse 2 eSignature: Electronically signed by Flynn Ybarra RN on 5/3/23 at 12:41 AM EDT
A complete drug regimen review was completed for this patient.      [x]  No clinically significant medication issue was identified    []   Yes, a clinically significant medication issue was identified     []  Adverse Drug Event:      []  Allergy:      []  Side Effect:      []  Ineffective Therapy:      []  Drug Interaction:     []  Duplicated Therapy:     []  Untreated Indication:      []  Non-adherence:     []  Other:      Nursing/Pharmacy contacted the physician:   Date:    Time:      Actions recommended by physician were completed:  Date :  Time:     Electronically signed by Lesley Sears RN on 5/1/23 at 6:25 AM EDT
ACUTE REHAB UNIT  SPEECH/LANGUAGE PATHOLOGY      [x] Daily  [] Weekly Care Conference Note  [] Discharge    Patient:Addison Covington      PAT:[birth year]  PNY:[id number]  Rehab Dx/Hx: Endocarditis of prosthetic aortic valve ([facility] [address] [T82. 6XXA, I35.8]    Precautions: Fall risk; Post surgical precautions; sternal precautions; Cognitive-Memory deficits; Pacemaker  Home situation: Lives with spouse  ST Dx: [] Aphasia  [] Dysarthria  [] Apraxia   [x] Oropharyngeal dysphagia [x] Cognitive   Impairment  [] Other:   Initial Speech Therapy Assessment Diagnosis:   1. Cognitive Diagnosis: Pt with deficits in domains of temporal orienation; higher level attention (alternating attention/divided attention); working memory and STM; PS/executive function with breakdown in both math language and complex reasoning. Unclear baseline as pt self reporting pt completes all adv home management  2. Speech Diagnosis: Audible connected speech. Slight left oral motor asym with phonemic distortions. Unclear baseline  3. Communication Diagnosis: Pt demonstrates concrete receptive and expressive language skills. Breakdown with complex language skills. Unclear baseline  4. Dysphagia Diagnosis: Mild oral stage dysphagia, Mild pharyngeal stage dysphagia characterized by varaible rate/disorganized bolus formtion/cohesion and A-P oral transit; delayed swallow. Pt appears to be tolerating current regular diet with compensatory strategies. F/u x 2;unless otherwise notified  Date of Admit: 4/30/2023  Room #: C6Y-5881/5434-04  Date: 5/12/2023       Current functional status (updated daily):         Current Diet Order:ADULT DIET; Regular; 5 carb choices (75 gm/meal);  Low Fat/Low Chol/High Fiber/JACQUELIN; 2000 ml   Behavior: [x] Alert  [x] Cooperative  [x]  Pleasant  [x] Confused Episodic  [] Agitated  [] Uncooperative  [] Distractible [] Motivated  [] Self-Limiting [] Anxious  [] Other:  Endurance:  [x] Adequate for participation in SLP sessions  [] Reduced overall
ACUTE REHAB UNIT  SPEECH/LANGUAGE PATHOLOGY      [x] Daily  [] Weekly Care Conference Note  [] Discharge    Patient:Addison Garcia      VQA:30/08/0862  ANX:[id number]  Rehab Dx/Hx: Endocarditis of prosthetic aortic valve ([facility] [address] [T82. 6XXA, I35.8]    Precautions: Fall risk; Post surgical precautions; sternal precautions; Cognitive-Memory deficits; Pacemaker  Home situation: Lives with spouse  ST Dx: [] Aphasia  [] Dysarthria  [] Apraxia   [x] Oropharyngeal dysphagia [x] Cognitive   Impairment  [] Other:   Initial Speech Therapy Assessment Diagnosis:   1. Cognitive Diagnosis: Pt with deficits in domains of temporal orienation; higher level attention (alternating attention/divided attention); working memory and STM; PS/executive function with breakdown in both math language and complex reasoning. Unclear baseline as pt self reporting pt completes all adv home management  2. Speech Diagnosis: Audible connected speech. Slight left oral motor asym with phonemic distortions. Unclear baseline  3. Communication Diagnosis: Pt demonstrates concrete receptive and expressive language skills. Breakdown with complex language skills. Unclear baseline  4. Dysphagia Diagnosis: Mild oral stage dysphagia, Mild pharyngeal stage dysphagia characterized by varaible rate/disorganized bolus formtion/cohesion and A-P oral transit; delayed swallow. Pt appears to be tolerating current regular diet with compensatory strategies. F/u x 2;unless otherwise notified  Date of Admit: 4/30/2023  Room #: O6G-4342/9949-06  Date: 5/4/2023       Current functional status (updated daily):         Current Diet Order:ADULT DIET; Regular; 5 carb choices (75 gm/meal);  Low Fat/Low Chol/High Fiber/JACQUELIN; 2000 ml   Behavior: [x] Alert  [x] Cooperative  [x]  Pleasant  [x] Confused Episodic  [] Agitated  [] Uncooperative  [] Distractible [] Motivated  [] Self-Limiting [] Anxious  [] Other:  Endurance:  [x] Adequate for participation in SLP sessions  [] Reduced overall
ACUTE REHAB UNIT  SPEECH/LANGUAGE PATHOLOGY      [x] Daily  [] Weekly Care Conference Note  [] Discharge    Patient:Addison Garcia      XTX:33/14/2205  [facility]:[id number]  Rehab Dx/Hx: Endocarditis of prosthetic aortic valve ([facility] [address] [T82. 6XXA, I35.8]    Precautions: Fall risk; Post surgical precautions; sternal precautions; Cognitive-Memory deficits; Pacemaker  Home situation: Lives with spouse  ST Dx: [] Aphasia  [] Dysarthria  [] Apraxia   [x] Oropharyngeal dysphagia [x] Cognitive   Impairment  [] Other:   Initial Speech Therapy Assessment Diagnosis:   1. Cognitive Diagnosis: Pt with deficits in domains of temporal orienation; higher level attention (alternating attention/divided attention); working memory and STM; PS/executive function with breakdown in both math language and complex reasoning. Unclear baseline as pt self reporting pt completes all adv home management  2. Speech Diagnosis: Audible connected speech. Slight left oral motor asym with phonemic distortions. Unclear baseline  3. Communication Diagnosis: Pt demonstrates concrete receptive and expressive language skills. Breakdown with complex language skills. Unclear baseline  4. Dysphagia Diagnosis: Mild oral stage dysphagia, Mild pharyngeal stage dysphagia characterized by varaible rate/disorganized bolus formtion/cohesion and A-P oral transit; delayed swallow. Pt appears to be tolerating current regular diet with compensatory strategies. F/u x 2;unless otherwise notified  Date of Admit: 4/30/2023  Room #: U6B-5596/8446-34  Date: 5/5/2023       Current functional status (updated daily):         Current Diet Order:ADULT DIET; Regular; 5 carb choices (75 gm/meal);  Low Fat/Low Chol/High Fiber/JACQUELIN; 2000 ml   Behavior: [x] Alert  [x] Cooperative  [x]  Pleasant  [x] Confused Episodic  [] Agitated  [] Uncooperative  [] Distractible [] Motivated  [] Self-Limiting [] Anxious  [] Other:  Endurance:  [x] Adequate for participation in SLP sessions  [] Reduced overall
ACUTE REHAB UNIT  SPEECH/LANGUAGE PATHOLOGY      [x] Daily  [] Weekly Care Conference Note  [] Discharge    Patient:Addison Holcomb      PZE:77/35/7632  OXB:[id number]  Rehab Dx/Hx: Endocarditis of prosthetic aortic valve ([facility] [address] [T82. 6XXA, I35.8]    Precautions: Fall risk; Post surgical precautions; sternal precautions; Cognitive-Memory deficits; Pacemaker  Home situation: Lives with spouse  ST Dx: [] Aphasia  [] Dysarthria  [] Apraxia   [x] Oropharyngeal dysphagia [x] Cognitive   Impairment  [] Other:   Initial Speech Therapy Assessment Diagnosis:   1. Cognitive Diagnosis: Pt with deficits in domains of temporal orienation; higher level attention (alternating attention/divided attention); working memory and STM; PS/executive function with breakdown in both math language and complex reasoning. Unclear baseline as pt self reporting pt completes all adv home management  2. Speech Diagnosis: Audible connected speech. Slight left oral motor asym with phonemic distortions. Unclear baseline  3. Communication Diagnosis: Pt demonstrates concrete receptive and expressive language skills. Breakdown with complex language skills. Unclear baseline  4. Dysphagia Diagnosis: Mild oral stage dysphagia, Mild pharyngeal stage dysphagia characterized by varaible rate/disorganized bolus formtion/cohesion and A-P oral transit; delayed swallow. Pt appears to be tolerating current regular diet with compensatory strategies. F/u x 2;unless otherwise notified  Date of Admit: 4/30/2023  Room #: [id number]-2004/3663-01  Date: 5/15/2023       Current functional status (updated daily):         Current Diet Order:ADULT DIET; Regular; 5 carb choices (75 gm/meal);  Low Fat/Low Chol/High Fiber/JACQUELIN; 2000 ml   Behavior: [x] Alert  [x] Cooperative  [x]  Pleasant  [x] Confused Episodic  [] Agitated  [] Uncooperative  [] Distractible [] Motivated  [] Self-Limiting [] Anxious  [] Other:  Endurance:  [x] Adequate for participation in SLP sessions  [] Reduced overall
ACUTE REHAB UNIT  SPEECH/LANGUAGE PATHOLOGY      [x] Daily  [] Weekly Care Conference Note  [] Discharge    Patient:Addison Steen      MNI:04/88/6111  GOT:[id number]  Rehab Dx/Hx: Endocarditis of prosthetic aortic valve ([facility] [address] [T82. 6XXA, I35.8]    Precautions: Fall risk; Post surgical precautions; sternal precautions; Cognitive-Memory deficits; Pacemaker  Home situation: Lives with spouse  ST Dx: [] Aphasia  [] Dysarthria  [] Apraxia   [x] Oropharyngeal dysphagia [x] Cognitive   Impairment  [] Other:   Initial Speech Therapy Assessment Diagnosis:   1. Cognitive Diagnosis: Pt with deficits in domains of temporal orienation; higher level attention (alternating attention/divided attention); working memory and STM; PS/executive function with breakdown in both math language and complex reasoning. Unclear baseline as pt self reporting pt completes all adv home management  2. Speech Diagnosis: Audible connected speech. Slight left oral motor asym with phonemic distortions. Unclear baseline  3. Communication Diagnosis: Pt demonstrates concrete receptive and expressive language skills. Breakdown with complex language skills. Unclear baseline  4. Dysphagia Diagnosis: Mild oral stage dysphagia, Mild pharyngeal stage dysphagia characterized by varaible rate/disorganized bolus formtion/cohesion and A-P oral transit; delayed swallow. Pt appears to be tolerating current regular diet with compensatory strategies. F/u x 2;unless otherwise notified  Date of Admit: 4/30/2023  Room #: F8B-9956/1468-97  Date: 5/11/2023       Current functional status (updated daily):         Current Diet Order:ADULT DIET; Regular; 5 carb choices (75 gm/meal);  Low Fat/Low Chol/High Fiber/JACQUELIN; 2000 ml   Behavior: [x] Alert  [x] Cooperative  [x]  Pleasant  [x] Confused Episodic  [] Agitated  [] Uncooperative  [] Distractible [] Motivated  [] Self-Limiting [] Anxious  [] Other:  Endurance:  [x] Adequate for participation in SLP sessions  [] Reduced overall
ACUTE REHAB UNIT  SPEECH/LANGUAGE PATHOLOGY      [x] Daily  [] Weekly Care Conference Note  [x] Discharge    Patient:Addison Alvarez      PHB:87/96/3926  ORE:[id number]  Rehab Dx/Hx: Endocarditis of prosthetic aortic valve ([facility] [address] [T82. 6XXA, I35.8]    Precautions: Fall risk; Post surgical precautions; sternal precautions; Cognitive-Memory deficits; Pacemaker  Home situation: Lives with spouse  ST Dx: [] Aphasia  [] Dysarthria  [] Apraxia   [x] Oropharyngeal dysphagia [x] Cognitive   Impairment  [] Other:   Initial Speech Therapy Assessment Diagnosis:   1. Cognitive Diagnosis: Pt with deficits in domains of temporal orienation; higher level attention (alternating attention/divided attention); working memory and STM; PS/executive function with breakdown in both math language and complex reasoning. Unclear baseline as pt self reporting pt completes all adv home management  2. Speech Diagnosis: Audible connected speech. Slight left oral motor asym with phonemic distortions. Unclear baseline  3. Communication Diagnosis: Pt demonstrates concrete receptive and expressive language skills. Breakdown with complex language skills. Unclear baseline  4. Dysphagia Diagnosis: Mild oral stage dysphagia, Mild pharyngeal stage dysphagia characterized by varaible rate/disorganized bolus formtion/cohesion and A-P oral transit; delayed swallow. Pt appears to be tolerating current regular diet with compensatory strategies. F/u x 2;unless otherwise notified  Date of Admit: 4/30/2023  Room #: I7Y-7163/6057-57  Date: 5/16/2023       Current functional status (updated daily):         Current Diet Order:ADULT DIET; Regular; 5 carb choices (75 gm/meal);  Low Fat/Low Chol/High Fiber/JACQUELIN; 2000 ml   Behavior: [x] Alert  [x] Cooperative  [x]  Pleasant  [x] Confused Episodic  [] Agitated  [] Uncooperative  [] Distractible [] Motivated  [] Self-Limiting [] Anxious  [] Other:  Endurance:  [x] Adequate for participation in SLP sessions  [] Reduced overall
ACUTE REHAB UNIT  SPEECH/LANGUAGE PATHOLOGY      [x] Daily  [x] Weekly Care Conference Note  [] Discharge    Patient:Addison Francisco      VBK:30/92/2497  ZWS:[id number]  Rehab Dx/Hx: Endocarditis of prosthetic aortic valve ([facility] [address] [T82. 6XXA, I35.8]    Precautions: Fall risk; Post surgical precautions; sternal precautions; Cognitive-Memory deficits  Home situation: Lives with spouse  ST Dx: [] Aphasia  [] Dysarthria  [] Apraxia   [x] Oropharyngeal dysphagia [x] Cognitive   Impairment  [] Other:   Initial Speech Therapy Assessment Diagnosis:   1. Cognitive Diagnosis: Pt with deficits in domains of temporal orienation; higher level attention (alternating attention/divided attention); working memory and STM; PS/executive function with breakdown in both math language and complex reasoning. Unclear baseline as pt self reporting pt completes all adv home management  2. Speech Diagnosis: Audible connected speech. Slight left oral motor asym with phonemic distortions. Unclear baseline  3. Communication Diagnosis: Pt demonstrates concrete receptive and expressive language skills. Breakdown with complex language skills. Unclear baseline  4. Dysphagia Diagnosis: Mild oral stage dysphagia, Mild pharyngeal stage dysphagia characterized by varaible rate/disorganized bolus formtion/cohesion and A-P oral transit; delayed swallow. Pt appears to be tolerating current regular diet with compensatory strategies. F/u x 2;unless otherwise notified  Date of Admit: 4/30/2023  Room #: [id number]/[id number]  Date: 5/3/2023       Current functional status (updated daily):         Current Diet Order:ADULT DIET; Regular; 5 carb choices (75 gm/meal);  Low Fat/Low Chol/High Fiber/JACQUELIN; 2000 ml   Behavior: [x] Alert  [x] Cooperative  [x]  Pleasant  [x] Confused Episodic  [] Agitated  [] Uncooperative  [] Distractible [] Motivated  [] Self-Limiting [] Anxious  [] Other:  Endurance:  [x] Adequate for participation in SLP sessions  [] Reduced overall  []
ACUTE REHAB UNIT  SPEECH/LANGUAGE PATHOLOGY      [x] Daily  [x] Weekly Care Conference Note  [] Discharge    Patient:Earnie Terrilyn Leyden      EUE:66/05/1807  AYT:[id number]  Rehab Dx/Hx: Endocarditis of prosthetic aortic valve ([facility] [address] [T82. 6XXA, I35.8]    Precautions: Fall risk; Post surgical precautions; sternal precautions; Cognitive-Memory deficits; Pacemaker  Home situation: Lives with spouse  ST Dx: [] Aphasia  [] Dysarthria  [] Apraxia   [x] Oropharyngeal dysphagia [x] Cognitive   Impairment  [] Other:   Initial Speech Therapy Assessment Diagnosis:   1. Cognitive Diagnosis: Pt with deficits in domains of temporal orienation; higher level attention (alternating attention/divided attention); working memory and STM; PS/executive function with breakdown in both math language and complex reasoning. Unclear baseline as pt self reporting pt completes all adv home management  2. Speech Diagnosis: Audible connected speech. Slight left oral motor asym with phonemic distortions. Unclear baseline  3. Communication Diagnosis: Pt demonstrates concrete receptive and expressive language skills. Breakdown with complex language skills. Unclear baseline  4. Dysphagia Diagnosis: Mild oral stage dysphagia, Mild pharyngeal stage dysphagia characterized by varaible rate/disorganized bolus formtion/cohesion and A-P oral transit; delayed swallow. Pt appears to be tolerating current regular diet with compensatory strategies. F/u x 2;unless otherwise notified  Date of Admit: 4/30/2023  Room #: [id number]-0712/2308-14  Date: 5/10/2023       Current functional status (updated daily):         Current Diet Order:ADULT DIET; Regular; 5 carb choices (75 gm/meal);  Low Fat/Low Chol/High Fiber/JACQUELIN; 2000 ml   Behavior: [x] Alert  [x] Cooperative  [x]  Pleasant  [x] Confused Episodic  [] Agitated  [] Uncooperative  [] Distractible [] Motivated  [] Self-Limiting [] Anxious  [] Other:  Endurance:  [x] Adequate for participation in SLP sessions  [] Reduced overall
Cath flow into purple lumen was effective. Good blood return. Normal saline flushed.
Clean catch urine specimen obtained for UA/C&S and sent to lab.
Comprehensive Nutrition Assessment    Type and Reason for Visit:  Initial, Consult (ARU admission)    Nutrition Recommendations/Plan:   Continue carb control, heart healthy, fluid restriction 2000 ml/day  Will monitor nutritional adequacy, nutrition-related labs, weights, BMs, and clinical progress      Malnutrition Assessment:  Malnutrition Status:  No malnutrition (05/01/23 1158)    Nutrition Assessment:    Patient admitted to ARU with endocarditis of prosthetic aortic valve. Currently on a carb control, heart healthy diet, fluid restriction 2000 ml/day. Po intake % meals. Will continue to monitor nutrition progress and goals of care. Nutrition Related Findings:    BG less than 180 mg/dl; labs reviewed on 5/1; last BM on 4/30 Wound Type: Surgical Incision       Current Nutrition Intake & Therapies:    Average Meal Intake: %  Average Supplements Intake: Unable to assess  ADULT DIET; Regular; 5 carb choices (75 gm/meal); Low Fat/Low Chol/High Fiber/JACQUELIN; 2000 ml    Anthropometric Measures:  Height: 5' 10\" (177.8 cm)  Ideal Body Weight (IBW): 166 lbs (75 kg)       Current Body Weight: 238 lb 1 oz (108 kg),   IBW. Weight Source: Bed Scale  Current BMI (kg/m2): 34.2                          BMI Categories: Obese Class 1 (BMI 30.0-34. 9)      Nutrition Diagnosis:   No nutrition diagnosis at this time related to   as evidenced by      Nutrition Interventions:   Food and/or Nutrient Delivery: Continue Current Diet     Coordination of Nutrition Care: Continue to monitor while inpatient       Goals:     Goals: PO intake 75% or greater       Nutrition Monitoring and Evaluation:      Food/Nutrient Intake Outcomes: Food and Nutrient Intake       Discharge Planning: Too soon to determine     RENNY, [address] [facility], [address] [place] [address],   Contact: Office: [phone];  [address]: [phone]
Comprehensive Nutrition Assessment    Type and Reason for Visit:  Reassess    Nutrition Recommendations/Plan:   Continue carb control, cardiac nutrition  Will monitor nutritional adequacy, nutrition-related labs, weights, BMs, and clinical progress      Malnutrition Assessment:  Malnutrition Status:  No malnutrition (05/01/23 1158)    Context:          Nutrition Assessment:    Follow up:  Continues on carb control, heart healthy diet. Po intake % meals. Patient denies any nutritional needs at this time. Nutrition Related Findings:    Ma 1.6 on 5/15 Wound Type: Surgical Incision       Current Nutrition Intake & Therapies:    Average Meal Intake: 51-75%, %  Average Supplements Intake: Unable to assess  ADULT DIET; Regular; 5 carb choices (75 gm/meal); Low Fat/Low Chol/High Fiber/JACQUELIN; 2000 ml    Anthropometric Measures:  Height: 5' 10\" (177.8 cm)  Ideal Body Weight (IBW): 166 lbs (75 kg)       Current Body Weight: 281 lb 12 oz (127.8 kg),   IBW.  Weight Source: Standing Scale  Current BMI (kg/m2): 40.4                          BMI Categories: Obese Class 3 (BMI 40.0 or greater)    Estimated Daily Nutrient Needs:  Energy Requirements Based On: Kcal/kg  Weight Used for Energy Requirements: Ideal  Energy (kcal/day): 5573-8495 (25-30 kcal/75.5 kg)  Weight Used for Protein Requirements: Ideal  Protein (g/day): 76-90 (1-1.2 g/90.6 kg)  Method Used for Fluid Requirements: 1 ml/kcal  Fluid (ml/day):      Nutrition Diagnosis:   No nutrition diagnosis at this time related to   as evidenced by      Nutrition Interventions:   Food and/or Nutrient Delivery: Continue Current Diet     Coordination of Nutrition Care: Continue to monitor while inpatient       Goals:     Goals: PO intake 75% or greater       Nutrition Monitoring and Evaluation:      Food/Nutrient Intake Outcomes: Food and Nutrient Intake  Physical Signs/Symptoms Outcomes: Biochemical Data, Nutrition Focused Physical Findings    Discharge Planning:
Comprehensive Nutrition Assessment    Type and Reason for Visit:  Reassess    Nutrition Recommendations/Plan:   Continue carb control, heart healthy diet  Fluid restriction per MD  Will monitor nutritional adequacy, nutrition-related labs, weights, BMs, and clinical progress      Malnutrition Assessment:  Malnutrition Status:  No malnutrition (05/01/23 1158)    Context:          Nutrition Assessment:    Follow up:  Currently on a carb control, heart healthy diet. Po intake has been consistently % meals. Patient currently out of room. Will continue to monitor for any needs. Nutrition Related Findings:    K and Mg low this am-being replaced orally-on Torsemide Wound Type: Surgical Incision       Current Nutrition Intake & Therapies:    Average Meal Intake: 51-75%, %  Average Supplements Intake: Unable to assess  ADULT DIET; Regular; 5 carb choices (75 gm/meal); Low Fat/Low Chol/High Fiber/JACQUELIN; 2000 ml    Anthropometric Measures:  Height: 5' 10\" (177.8 cm)  Ideal Body Weight (IBW): 166 lbs (75 kg)       Current Body Weight: 238 lb 1 oz (108 kg),   IBW. Weight Source: Bed Scale  Current BMI (kg/m2): 34.2                          BMI Categories: Obese Class 1 (BMI 30.0-34. 9)      Nutrition Diagnosis:   No nutrition diagnosis at this time related to   as evidenced by      Nutrition Interventions:   Food and/or Nutrient Delivery: Continue Current Diet     Coordination of Nutrition Care: Continue to monitor while inpatient       Goals:     Goals: PO intake 75% or greater       Nutrition Monitoring and Evaluation:      Food/Nutrient Intake Outcomes: Food and Nutrient Intake  Physical Signs/Symptoms Outcomes: Biochemical Data, Nutrition Focused Physical Findings    Discharge Planning:    Continue current diet     RENNY, [address] [facility], [address] [place] [address],   Contact: Office: [phone];  [address]: [address]
Confirmation number for bed [id number].
Department of Physical Medicine & Rehabilitation  Progress Note    Patient Identification:  Arch Osler  [id number]  : 1940  Admit date: 2023    Chief Complaint: Endocarditis of prosthetic aortic valve (HCC)    Subjective:   No acute events overnight. Patient seen this morning sitting up in gym. He reports being anxious to return home. Lacking insight into ongoing difficulty with sit>stand transfers. D/w PT, he continues to need intermittent assist for this which wife is not able to provide. D/w SW who contact wife. Patient does have lift chair at home and team feels this will be sufficient until sternal precautions are lifted. Labs reviewed. ROS: No f/c, n/v, cp     Objective:  Patient Vitals for the past 24 hrs:   BP Temp Temp src Pulse Resp SpO2 Weight   23 1620 -- -- -- -- 18 -- --   23 0824 -- -- -- -- 16 94 % --   23 0820 122/74 97.9 °F (36.6 °C) Oral 99 18 97 % --   23 0600 -- -- -- -- -- -- 283 lb 1.1 oz (128.4 kg)   23 0438 138/74 97.9 °F (36.6 °C) Oral 65 16 94 % --   05/15/23 2108 (!) 144/61 98.3 °F (36.8 °C) Oral 65 16 94 % --   05/15/23 2036 -- -- -- -- 16 -- --   05/15/23 2006 -- -- -- -- 16 -- --     Const: Alert. No distress, pleasant. HEENT: Normocephalic, atraumatic. Normal sclera/conjunctiva. MMM. CV: Regular rate and rhythm. Resp: No respiratory distress. Lung diminished at bases  Abd: Soft, nontender, nondistended, NABS+   Ext: +edema (improving)  Neuro: Alert, oriented, impaired recall  Psych: Cooperative, appropriate mood and affect    Laboratory data: Available via EMR.    Last 24 hour lab  Recent Results (from the past 24 hour(s))   POCT Glucose    Collection Time: 05/15/23  8:06 PM   Result Value Ref Range    POC Glucose 145 (H) 70 - 99 mg/dl    Performed on ACCU-CHEK    POCT Glucose    Collection Time: 23  6:55 AM   Result Value Ref Range    POC Glucose 163 (H) 70 - 99 mg/dl    Performed on ACCU-CHEK    POCT Glucose
Department of Physical Medicine & Rehabilitation  Progress Note    Patient Identification:  Jeyson Quinonez  [id number]  : 1940  Admit date: 2023    Chief Complaint: Endocarditis of prosthetic aortic valve (HCC)    Subjective:   No acute events overnight. Patient seen this am sitting up in room. Denies new concerns. States therapy has been helpful thus far. Wife updated at the bedside. Notified by RN of green urine. Per wife and staff, this has been ongoing. Wife states she saw bottle of green food coloring in patient's room at [facility]. Labs reviewed. ROS: No f/c, n/v, cp     Objective:  Patient Vitals for the past 24 hrs:   BP Temp Temp src Pulse Resp SpO2 Weight   23 1527 -- -- -- -- 16 -- --   23 1316 (!) 115/56 98.4 °F (36.9 °C) Oral 60 16 96 % --   23 1206 -- -- -- -- -- 95 % --   23 0925 126/74 -- -- 68 -- 96 % --   23 0810 -- -- -- -- 18 -- --   23 0413 124/75 97.8 °F (36.6 °C) Oral 61 16 96 % 285 lb 7.9 oz (129.5 kg)   23 -- -- -- -- 16 -- --   23 (!) 146/71 98.4 °F (36.9 °C) Oral 63 18 95 % --     Const: Alert. No distress, pleasant. HEENT: Normocephalic, atraumatic. Normal sclera/conjunctiva. MMM. CV: Regular rate and rhythm. Resp: No respiratory distress. Lung diminished at bases  Abd: Soft, nontender, nondistended, NABS+   Ext: Trace edema  Neuro: Alert, oriented, impaired recall  Psych: Cooperative, appropriate mood and affect    Laboratory data: Available via EMR.    Last 24 hour lab  Recent Results (from the past 24 hour(s))   POCT Glucose    Collection Time: 23  7:58 PM   Result Value Ref Range    POC Glucose 170 (H) 70 - 99 mg/dl    Performed on ACCU-CHEK    POCT Glucose    Collection Time: 23  2:02 AM   Result Value Ref Range    POC Glucose 166 (H) 70 - 99 mg/dl    Performed on ACCU-CHEK    POCT Glucose    Collection Time: 23  7:25 AM   Result Value Ref Range    POC Glucose 132 (H) 70 - 99
Department of Physical Medicine & Rehabilitation  Progress Note    Patient Identification:  Kelton Boateng  [id number]  : 1940  Admit date: 2023    Chief Complaint: Endocarditis of prosthetic aortic valve (HCC)    Subjective:   No acute events overnight. Patient seen this afternoon sitting up in gym. LE swelling is improved. Having some sensitivity in his legs. No erythema (aside from chronic area medial right lower leg) or warmth. Labs reviewed. ROS: No f/c, n/v, cp     Objective:  Patient Vitals for the past 24 hrs:   BP Temp Temp src Pulse Resp SpO2 Weight   05/15/23 1011 -- -- -- -- -- 93 % --   05/15/23 0948 -- -- -- 65 -- -- --   05/15/23 0629 -- -- -- -- -- -- 281 lb 12 oz (127.8 kg)   05/15/23 0426 (!) 150/66 97.4 °F (36.3 °C) Oral 61 14 92 % --   23 (!) 131/54 98.2 °F (36.8 °C) Oral 63 15 94 % --   23 (!) 131/54 -- -- 63 -- -- --   23 1348 128/73 98.1 °F (36.7 °C) Oral 61 18 95 % --     Const: Alert. No distress, pleasant. HEENT: Normocephalic, atraumatic. Normal sclera/conjunctiva. MMM. CV: Regular rate and rhythm. Resp: No respiratory distress. Lung diminished at bases  Abd: Soft, nontender, nondistended, NABS+   Ext: +edema (improving)  Neuro: Alert, oriented, impaired recall  Psych: Cooperative, appropriate mood and affect    Laboratory data: Available via EMR.    Last 24 hour lab  Recent Results (from the past 24 hour(s))   POCT Glucose    Collection Time: 23  3:54 PM   Result Value Ref Range    POC Glucose 135 (H) 70 - 99 mg/dl    Performed on ACCU-CHEK    POCT Glucose    Collection Time: 23  8:41 PM   Result Value Ref Range    POC Glucose 158 (H) 70 - 99 mg/dl    Performed on ACCU-CHEK    C-Reactive Protein    Collection Time: 05/15/23  6:18 AM   Result Value Ref Range    CRP 16.4 (H) 0.0 - 5.1 mg/L   CBC with Auto Differential    Collection Time: 05/15/23  6:18 AM   Result Value Ref Range    WBC 7.8 4.0 - 11.0 K/uL    RBC 2.95 (L)
Department of Physical Medicine & Rehabilitation  Progress Note    Patient Identification:  Pamela Howard  [id number]  : 1940  Admit date: 2023    Chief Complaint: Endocarditis of prosthetic aortic valve (HCC)    Subjective:   No acute events overnight. Patient seen this afternoon sitting up in room. Urine now appearing more normal in color. Patient has noted increased UOP on increased dose of torsemide. Informed by RN later in the afternoon that patient became hypoglycemic following lunch. Pre meal his blood sugar was 80s. He was given prandial dose of 5 units as he ate 100% of his meal. Then sugar dropped to 50s. Responded to hypoglycemia procotol. Labs reviewed. ROS: No f/c, n/v, cp     Objective:  Patient Vitals for the past 24 hrs:   BP Temp Temp src Pulse Resp SpO2 Weight   23 119/75 98 °F (36.7 °C) Oral 69 16 95 % --   23 119/75 -- -- 66 -- -- --   23 1448 (!) 147/73 98.1 °F (36.7 °C) Oral 62 16 93 % --   23 0804 -- -- -- -- 16 -- --   23 0715 136/82 98.5 °F (36.9 °C) Oral 67 16 93 % --   23 0600 -- -- -- -- -- -- 291 lb 7.2 oz (132.2 kg)   23 0409 (!) 132/54 97.3 °F (36.3 °C) Oral 62 16 93 % --     Const: Alert. No distress, pleasant. HEENT: Normocephalic, atraumatic. Normal sclera/conjunctiva. MMM. CV: Regular rate and rhythm. Resp: No respiratory distress. Lung diminished at bases  Abd: Soft, nontender, nondistended, NABS+   Ext: +edema  Neuro: Alert, oriented, impaired recall  Psych: Cooperative, appropriate mood and affect    Laboratory data: Available via EMR.    Last 24 hour lab  Recent Results (from the past 24 hour(s))   CBC with Auto Differential    Collection Time: 23  6:08 AM   Result Value Ref Range    WBC 6.5 4.0 - 11.0 K/uL    RBC 2.63 (L) 4.20 - 5.90 M/uL    Hemoglobin 7.9 (L) 13.5 - 17.5 g/dL    Hematocrit 24.2 (L) 40.5 - 52.5 %    MCV 92.0 80.0 - 100.0 fL    MCH 30.1 26.0 - 34.0 pg    MCHC 32.7 31.0 -
Department of Physical Medicine & Rehabilitation  Progress Note    Patient Identification:  Sixto Olivares  [id number]  : 1940  Admit date: 2023    Chief Complaint: Endocarditis of prosthetic aortic valve (HCC)    Subjective:   No acute events overnight. Patient seen this am sitting up in room. Reports some fatigue from therapies, but otherwise doing well. Chest wall and back pain overall improving. Labs reviewed. ROS: No f/c, n/v, cp     Objective:  Patient Vitals for the past 24 hrs:   BP Temp Temp src Pulse Resp SpO2 Weight   23 0839 -- -- -- -- 16 -- --   23 0809 -- -- -- -- 16 -- --   23 0752 -- -- -- 61 -- -- --   23 0737 (!) 144/73 98.5 °F (36.9 °C) Oral 73 16 96 % --   23 0513 -- -- -- -- -- -- 285 lb 7.9 oz (129.5 kg)   23 0436 (!) 148/82 97.7 °F (36.5 °C) Oral 62 16 95 % --   23 2106 (!) 121/52 98 °F (36.7 °C) Oral 70 16 94 % --   23 1851 -- -- -- -- 16 -- --   23 1454 (!) 131/56 97.6 °F (36.4 °C) Oral 62 18 98 % --     Const: Alert. No distress, pleasant. HEENT: Normocephalic, atraumatic. Normal sclera/conjunctiva. MMM. CV: Regular rate and rhythm. Resp: No respiratory distress. Lung diminished at bases  Abd: Soft, nontender, nondistended, NABS+   Ext: Trace edema  Neuro: Alert, oriented, impaired recall  Psych: Cooperative, appropriate mood and affect    Laboratory data: Available via EMR.    Last 24 hour lab  Recent Results (from the past 24 hour(s))   POCT Glucose    Collection Time: 23 11:24 AM   Result Value Ref Range    POC Glucose 173 (H) 70 - 99 mg/dl    Performed on ACCU-CHEK    POCT Glucose    Collection Time: 23  4:17 PM   Result Value Ref Range    POC Glucose 104 (H) 70 - 99 mg/dl    Performed on ACCU-CHEK    POCT Glucose    Collection Time: 23  8:35 PM   Result Value Ref Range    POC Glucose 119 (H) 70 - 99 mg/dl    Performed on ACCU-CHEK    POCT Glucose    Collection Time: 23  6:59 AM
Does fall back at times with transfer, cues for sternal and pacemaker precautions.
[facility]  Diabetes Education   Progress Note       NAME:  Beverly Abdalla  MEDICAL RECORD NUMBER:  [id number]  AGE: 81 y.o. GENDER: male  : 1940  TODAY'S DATE:  5/3/2023    Subjective   Reason for Diabetic Education Evaluation and Assessment: general diabetes support    Addison denies any low blood sugar symptoms yesterday. He describes sharing diabetes management responsibilities with his wife including insulin administration and glucose monitoring.       Visit Type: evaluation      Beverly Abdalla is a 81 y.o. male referred by:     [] Physician  [] Nursing  [x] Chart Review   [] Other:     PAST MEDICAL HISTORY        Diagnosis Date    Aortic stenosis     CAD (coronary artery disease)     CHF (congestive heart failure) (HCC)     Complete heart block (HCC)     Depression     Diastolic CHF (HCC)     DM2 (diabetes mellitus, type 2) ([address]     Endocarditis of prosthetic aortic valve [address] [address]     Gout     Lumbar stenosis        PAST SURGICAL HISTORY    Past Surgical History:   Procedure Laterality Date    AORTIC VALVE REPAIR      TAVR 2022 then open AVR 2023 with Dr. Zully Waterman      2023 with Dr. Kwasi Bravo History   Problem Relation Age of Onset    Cancer Mother     Hypertension Father     Heart Disease Father     Cancer Sister     COPD Sister     Parkinson's Disease Brother     Atrial Fibrillation Brother        SOCIAL HISTORY    Social History     Tobacco Use    Smoking status: Former     Types: Cigarettes    Smokeless tobacco: Never    Tobacco comments:     Quit 40 years ago   Vaping Use    Vaping Use: Never used   Substance Use Topics    Alcohol use: Not Currently    Drug use: Never       ALLERGIES    No Known Allergies    MEDICATIONS     insulin glargine  14 Units SubCUTAneous Nightly    insulin lispro  5 Units SubCUTAneous TID WC    acetaminophen  1,000 mg Oral 3 times per day    magnesium oxide  200 mg Oral Daily with breakfast
[facility]  Hypoglycemia Event and Prevention Plan      NAME: Lamberto Sparks  MEDICAL RECORD NUMBER:  [id number]  AGE: 81 y.o. GENDER: male  : 1940  EPISODE DATE:  2023     Data     Recent Labs     23  2139 23  0203 23  0708   POCGLU 54* 58* 67* 88 212* 142*       Medications  Scheduled Medications:   acetaminophen  1,000 mg Oral 3 times per day    magnesium oxide  200 mg Oral Daily with breakfast    QUEtiapine  50 mg Oral Nightly    metoprolol tartrate  12.5 mg Oral BID    guaiFENesin  600 mg Oral BID    torsemide  20 mg Oral Daily    melatonin  6 mg Oral Nightly    insulin glargine  10 Units SubCUTAneous BID    insulin lispro  8 Units SubCUTAneous TID WC    insulin lispro  0-4 Units SubCUTAneous TID WC    insulin lispro  0-4 Units SubCUTAneous Nightly    aspirin  324 mg Oral Daily    cefTRIAXone (ROCEPHIN) IV  2,000 mg IntraVENous Q12H    atorvastatin  40 mg Oral Nightly    ampicillin IV  2,000 mg IntraVENous Q4H    FLUoxetine  60 mg Oral Daily    lidocaine  2 patch TransDERmal Daily    Vitamin D  1,000 Units Oral Daily    allopurinol  600 mg Oral Daily    pantoprazole  40 mg Oral QAM AC    multivitamin  1 tablet Oral Daily    sodium chloride flush  5-40 mL IntraVENous 2 times per day    enoxaparin  40 mg SubCUTAneous Daily    sennosides-docusate sodium  1 tablet Oral BID       Diet  Current diet/supplement order: ADULT DIET; Regular; 5 carb choices (75 gm/meal); Low Fat/Low Chol/High Fiber/JACQUELIN; 2000 ml     Recorded PO: PO Meals Eaten (%): 26 - 50% last meal in flowsheets      Action      Total dose of insulin yesterday = 34  Home Lantus plan is 40 units every evening. Physician Notified of event: Yes   Charlotte Chu MD    Recommend transitioning back to evening Lantus schedule. Recommend BG targets 140 - 180. Recommend Lantus 14 - 15 units at bedtime and Humalog 5 units with meals.       POCT added at
Morning assessment and vital signs complete. Patient given scheduled medications as prescribed. IV antibiotics infusing to ALBINO PICC. Patient is alert and oriented x4. Patient denies pain at this time. Call light is within reach. He is up to chair working with OT/PT.
OCCUPATIONAL THERAPY  Progress Note   Second Session    Patient Name: Kobe Helen [address]. Record Number: [id number]    Treatment Diagnosis: impaired functional mobility training following aortic valve replacement    General  Chart Reviewed: Yes, Orders, Progress Notes, History and Physical, Previous Admission  Family / Caregiver Present: No  Referring Practitioner: mercedez  Diagnosis: Endocarditis of prosthetic aortic valve     Restrictions/Precautions  Restrictions/Precautions: Fall Risk, General Precautions        Position Activity Restriction  Sternal Precautions: No Pushing, No Pulling, 5# Lifting Restrictions  Other position/activity restrictions: PPM - limit shoulder extension to 90 deg LUE for one month after surgery, do not place smart phone directly over device, PICC line right upper arm    Subjective: patient met in dept, agreeable to OT    Objective:  UE exLUE shoulder flex to 80 deg - stated he has history of rotator cuff injury. No weight used for LUE shouder flex/ext, horizontal abd/add, 2 lb right shoulder flex/ext, horizontal abd/add, bilat elbow flex/ext, sup/pron, wrist flex/ext - 15 reps each to improve activity tolerance for ADL/mobility    Pt stated he felt like his sugar was low, hands a little shakey, felt like his stomach was empty. Notified RN who came to check BS immediately - 53. Brought cranberry juice and mina crackers juice. After nutrition, pt returned to room  Assessment: Pt was limited in session today due to low BS after UE exercises. Returned to room at end of session    Pt stood from wheelchair using \"diving\" position with UE, to improve forward momentum.   Pt required min assist sit to stand, amb with HDRW rollator 10 ft to recliner, sat to recliner with SBA  Chair alarm set, pt with nurse for BS for set up    Safety Device - Type of devices:  []  All fall risk precautions in place [] Bed alarm in place  [] Call light within reach [] Chair alarm in place []
Occupational Therapy  Facility/Department: [facility]  Rehabilitation Occupational Therapy Daily Treatment Note    Date: 23  Patient Name: Millie Patel       Room: E7S-8426/9730-00  MRN: [id number]  Account: [de-identified]   : 1940  (81 y.o.) Gender: male                    Past Medical History:  has a past medical history of Aortic stenosis, CAD (coronary artery disease), CHF (congestive heart failure) ([facility] [address] Complete heart block ([facility] [address] Depression, Diastolic CHF ([facility] [address] DM2 (diabetes mellitus, type 2) ([facility] Endocarditis of prosthetic aortic valve ([facility] Gout, and Lumbar stenosis. Past Surgical History:   has a past surgical history that includes Aortic valve repair and pacemaker placement. Restrictions  Restrictions/Precautions: Fall Risk, General Precautions  Other position/activity restrictions: PPM - limit shoulder extension to 90 deg LUE for one month after surgery, do not place smart phone directly over device  Equipment Used: Wheelchair, Other (recliner)    Subjective  Subjective: pt met bedside, nursing getting pt out of bed with walker, specialty bed now in place - not firm on the edges                Objective     Cognition  Overall Cognitive Status: Exceptions  Memory: Decreased short term memory;Decreased recall of recent events  Problem Solving: Assistance required to implement solutions;Assistance required to generate solutions  Insights: Decreased awareness of deficits  Sequencing: Requires cues for some  Cognition Comment: pt more alert, still states he is fatigued, but alert  Orientation  Overall Orientation Status: Within Functional Limits  Orientation Level: Oriented X4 (except exact date. Stated date was 2023 (actual date 23).   Difficulty coming up with name of hospital, but did eventually without cue, not completely clear on medical history)         ADL  Grooming/Oral Hygiene  Assistance Level: Modified independent  Skilled Clinical Factors: seated in
Occupational Therapy  Facility/Department: [facility]  Rehabilitation Occupational Therapy Daily Treatment Note    Date: 23  Patient Name: Sylvia Espino       Room: V9I-8400/5075-38  MRN: [id number]  Account: [de-identified]   : 1940  (81 y.o.) Gender: male                    Past Medical History:  has a past medical history of Aortic stenosis, CAD (coronary artery disease), CHF (congestive heart failure) ([facility] [address] Complete heart block ([facility] [address] Depression, Diastolic CHF ([facility] [address] DM2 (diabetes mellitus, type 2) ([facility] [address] Endocarditis of prosthetic aortic valve ([facility] [address] Gout, and Lumbar stenosis. Past Surgical History:   has a past surgical history that includes Aortic valve repair and pacemaker placement. Restrictions  Restrictions/Precautions: Fall Risk, General Precautions  Other position/activity restrictions: PPM - limit shoulder extension to 90 deg LUE for one month after surgery, do not place smart phone directly over device, PICC line right upper arm  Equipment Used: Wheelchair, Other (recliner)    Subjective  Subjective: pt met bedside, nursing finishing with IV, transported to [facility]  Restrictions/Precautions: Fall Risk;General Precautions             Objective     Cognition  Overall Cognitive Status: Exceptions  Memory: Decreased short term memory;Decreased recall of recent events  Problem Solving: Assistance required to implement solutions;Assistance required to generate solutions  Insights: Decreased awareness of deficits  Cognition Comment: still cues for sternal precautions  Orientation  Overall Orientation Status: Within Functional Limits         ADL             Sit to Stand  Assistance Level: Minimal assistance  Skilled Clinical Factors: fluctuates depending on surface height . Completed 3 sets of sit/stand. Emphasis on foot placement, rocking motion for momentum with sit to stand.   Pt takes multiple repetitions, eventually able to stand with min assist, but sits with CGA  Stand to
Occupational Therapy  Facility/Department: [facility]  Rehabilitation Occupational Therapy Daily Treatment Note    Date: 23  Patient Name: Rhona Molina       Room: Z5X-3394/9299-31  MRN: [id number]  Account: [de-identified]   : 1940  (81 y.o.) Gender: male                    Past Medical History:  has a past medical history of Aortic stenosis, CAD (coronary artery disease), CHF (congestive heart failure) ([facility] [address] Complete heart block ([facility] [address] Depression, Diastolic CHF ([facility] [address] DM2 (diabetes mellitus, type 2) ([facility] [address] Endocarditis of prosthetic aortic valve ([facility] Gout, and Lumbar stenosis. Past Surgical History:   has a past surgical history that includes Aortic valve repair and pacemaker placement. Restrictions  Restrictions/Precautions: Fall Risk, General Precautions  Other position/activity restrictions: PPM - limit shoulder extension to 90 deg LUE for one month after surgery, do not place smart phone directly over device, PICC line right upper arm  Equipment Used: Wheelchair, Other (recliner)    Subjective  Subjective: Pt seen in the department. Restrictions/Precautions: Fall Risk;General Precautions             Objective               ADL             Functional Mobility  Device: 4-Wheeled walker  Activity: To/From therapy gym  Assistance Level: Stand by assist  Bed Mobility  Overall Assistance Level: Minimal Assistance  Additional Factors: Head of bed flat  Bridging  Assistance Level: Minimal assistance  Sit to Supine  Assistance Level: Stand by assist  Supine to Sit  Assistance Level: Minimal assistance  Skilled Clinical Factors: Pt able to manage his legs. Required min assist with his trunk. Sit to Stand  Assistance Level: Moderate assistance;Minimal assistance;Contact guard assist  Skilled Clinical Factors: Pt fluctuated with assist level based on height of the surface. He required min/CGA to stand from the wheelchair. Pt cued to reach arms forward and to \"throw\" his UB forward to stand.
Occupational Therapy  Facility/Department: [facility]  Rehabilitation Occupational Therapy Daily Treatment Note    Date: 23  Patient Name: Simon Molina       Room: M5V-0660/  MRN: [id number]  Account: [de-identified]   : 1940  (81 y.o.) Gender: male                    Past Medical History:  has a past medical history of Aortic stenosis, CAD (coronary artery disease), CHF (congestive heart failure) ([facility] [address] Complete heart block ([facility] [address] Depression, Diastolic CHF ([facility] [address] DM2 (diabetes mellitus, type 2) ([facility] [address] Endocarditis of prosthetic aortic valve ([facility] [address] Gout, and Lumbar stenosis. Past Surgical History:   has a past surgical history that includes Aortic valve repair and pacemaker placement. Restrictions  Restrictions/Precautions: Fall Risk, General Precautions  Other position/activity restrictions: PPM - limit shoulder extension to 90 deg LUE for one month after surgery, do not place smart phone directly over device  Equipment Used: Wheelchair, Other (recliner)    Subjective  Subjective: pt met in dept, agreeable to OT, stated he has some upper chest discomfort. Educated pt regarding sternal precautions as this could cause pain with too much wt bearing  Restrictions/Precautions: Fall Risk;General Precautions             Objective     Cognition  Overall Cognitive Status: Exceptions  Memory: Decreased short term memory;Decreased recall of recent events  Problem Solving: Assistance required to implement solutions;Assistance required to generate solutions  Cognition Comment: Pt still sleepy, but improved with alertness from yesterday  Orientation  Overall Orientation Status: Within Functional Limits  Orientation Level: Oriented X4 (except exact date. Stated date was 2023 (actual date 23).   Difficulty coming up with name of hospital, but did eventually without cue, not completely clear on medical history)         ADL  Lower Extremity Bathing  Skilled Clinical Factors: provided pt
Occupational Therapy  Facility/Department: [facility]  Rehabilitation Occupational Therapy Daily Treatment Note    Date: 23  Patient Name: Jesica Calderon       Room: Q5N-1603/0529-17  MRN: [id number]  Account: [de-identified]   : 1940  (81 y.o.) Gender: male                    Past Medical History:  has a past medical history of Aortic stenosis, CAD (coronary artery disease), CHF (congestive heart failure) ([facility] [address] Complete heart block ([facility] [address] Depression, Diastolic CHF ([facility] [address] DM2 (diabetes mellitus, type 2) ([facility] [address] [facility]ocard[facility] Gout, and Lumbar stenosis. Past Surgical History:   has a past surgical history that includes Aortic valve repair and pacemaker placement. Restrictions  Restrictions/Precautions: Fall Risk, General Precautions  Other position/activity restrictions: PPM - limit shoulder extension to 90 deg LUE for one month after surgery, do not place smart phone directly over device  Equipment Used: Wheelchair, Other (recliner)    Subjective  Subjective: Pt seen bedside initially as pt was finishing up his breakfast.  Pt reports is sleepy this morning. Restrictions/Precautions: Fall Risk;General Precautions             Objective               ADL  Feeding  Assistance Level: Modified independent  Skilled Clinical Factors: Pt able to manage food with utensils and drink without assist.  Pt wears dentures. Toileting  Assistance Level: Moderate assistance  Skilled Clinical Factors: Pt stood to attempt urinating using a urinal.  Pt required assist to manage pants up/down. Pt able to stand with CGA to attempt to use the urinal.  Pt able to position and hold the urinal.  Toilet Transfers  Equipment: Beside commode (Placed over the toilet.)  Assistance Level: Contact guard assist  Skilled Clinical Factors: Pt able to stand from the toilet without counting. Cues to not push with his hands.           Functional Mobility  Device: Rolling walker  Activity: To/From
Occupational Therapy  Facility/Department: [facility]  Rehabilitation Occupational Therapy Daily Treatment Note    Date: 5/10/23  Patient Name: Shay Dent       Room: F1P-6475/0519-90  MRN: [id number]  Account: [de-identified]   : 1940  (81 y.o.) Gender: male                    Past Medical History:  has a past medical history of Aortic stenosis, CAD (coronary artery disease), CHF (congestive heart failure) ([facility] [facility] Complete heart block ([facility] [address] Depression, Diastolic CHF ([facility] [address] DM2 (diabetes mellitus, type 2) ([facility] [address] Endocarditis of prosthetic aortic valve ([facility] [address] Gout, and Lumbar stenosis. Past Surgical History:   has a past surgical history that includes Aortic valve repair and pacemaker placement. Restrictions  Restrictions/Precautions: Fall Risk, General Precautions  Other position/activity restrictions: PPM - limit shoulder extension to 90 deg LUE for one month after surgery, do not place smart phone directly over device, PICC line right upper arm  Equipment Used: Wheelchair, Other (recliner)    Subjective  Subjective: pt met bedside, agreeable to shower  Restrictions/Precautions: Fall Risk;General Precautions             Objective     Cognition  Overall Cognitive Status: Exceptions  Memory: Decreased short term memory;Decreased recall of recent events  Problem Solving: Assistance required to implement solutions;Assistance required to generate solutions  Insights: Decreased awareness of deficits  Cognition Comment: still cues for sternal precautions  Orientation  Overall Orientation Status: Within Functional Limits         ADL  Grooming/Oral Hygiene  Assistance Level: Stand by assist  Skilled Clinical Factors: stood at sink for oral care  Upper Extremity Bathing  Assistance Level: Set-up  Skilled Clinical Factors: covered PICC line completed UE bathing per self using appropriate soaps  Lower Extremity Bathing  Assistance Level: Set-up; Verbal cues;Contact guard assist  Skilled Clinical Factors:
Occupational Therapy  Facility/Department: [facility]  Rehabilitation Occupational Therapy Evaluation       Date: 23  Patient Name: Celestina Moreira       Room: D0X-0901/4009-53  MRN: [id number]  Account: [de-identified]   : 1940  (81 y.o.) Gender: male     Referring Practitioner: mercedez  Diagnosis: Endocarditis of prosthetic aortic valve       Restrictions  Restrictions/Precautions: Fall Risk;General Precautions  Other position/activity restrictions: PPM - limit shoulder extension to 90 deg LUE for one month after surgery, do not place smart phone directly over device  Equipment Used: Wheelchair; Other (recliner)    Subjective  Subjective: pt met bedside, agreeable to shower, OTeval          Social/Functional History  Lives With: Spouse (wife, Dinorah Thomas, in good health and retired)  Type of Home: [address]: One level  Home Access: Stairs to enter with [address] - Number of Steps: 2  Entrance Stairs - Rails: Right (ascending)  Bathroom Shower/Tub: Walk-in shower; Shower chair with back; Doors  Bathroom Toilet: Handicap height  Bathroom Equipment: Shower chair;Grab bars around toilet;Grab bars in shower  Bathroom Accessibility: Walker accessible  Home Equipment: Judi Renae, 4 wheeled;Cane  Has the patient had two or more falls in the past year or any fall with injury in the past year?: No  Receives Help From: Family  ADL Assistance: Independent  Homemaking Assistance:  (wife does all homemaking, but patient assists with finances. Wife organizes patient's medications)  Ambulation Assistance: Independent (cane or walker depending on area of the house and how he was feeling)  Transfer Assistance: Independent  Active : No  Patient's  Info: patient reports he can drive, but has not driven in a year.   Wife usually drives patient  Mode of Transportation: SUV  Occupation: Retired  Type of Occupation: retired as a   Leisure & Hobbies: watch TV and read  Additional Comments:
Occupational Therapy  Facility/Department: [facility]  Rehabilitation Occupational Therapy Daily Treatment Note    Date: 5/3/23  Patient Name: Pinky Sierra       Room: L5V-4129/0851-43  MRN: [id number]  Account: [de-identified]   : 1940  (81 y.o.) Gender: male                    Past Medical History:  has a past medical history of Aortic stenosis, CAD (coronary artery disease), CHF (congestive heart failure) ([facility] [address] Complete heart block ([facility] [address] Depression, Diastolic CHF ([facility] [address] DM2 (diabetes mellitus, type 2) ([facility] [address] Endocarditis of prosthetic aortic valve ([facility] Gout, and Lumbar stenosis. Past Surgical History:   has a past surgical history that includes Aortic valve repair and pacemaker placement. Restrictions  Restrictions/Precautions: Fall Risk, General Precautions  Other position/activity restrictions: PPM - limit shoulder extension to 90 deg LUE for one month after surgery, do not place smart phone directly over device  Equipment Used: Wheelchair, Other (recliner)    Subjective  Subjective: pt met bedside, stated he is very sleepy this morning, agreeable to OT  Restrictions/Precautions: Fall Risk;General Precautions             Objective     Cognition  Overall Cognitive Status: Exceptions  Memory: Decreased short term memory;Decreased recall of recent events  Problem Solving: Assistance required to implement solutions;Assistance required to generate solutions  Insights: Decreased awareness of deficits  Sequencing: Requires cues for some  Cognition Comment: Pt still sleepy, but improved with alertness from yesterday  Orientation  Overall Orientation Status: Within Functional Limits  Orientation Level: Oriented X4 (except exact date. Stated date was 2023 (actual date 23). Difficulty coming up with name of hospital, but did eventually without cue, not completely clear on medical history)         ADL  Grooming/Oral Hygiene  Assistance Level: Set-up; Minimal assistance  Skilled Clinical
Occupational Therapy  Facility/Department: [facility]  Rehabilitation Occupational Therapy Daily Treatment Note    Date: 23  Patient Name: Marizol Flor       Room: N9B-2380/8317-92  MRN: [id number]  Account: [de-identified]   : 1940  (81 y.o.) Gender: male                    Past Medical History:  has a past medical history of Aortic stenosis, CAD (coronary artery disease), CHF (congestive heart failure) ([facility] [address] Complete heart block ([facility] [address] Depression, Diastolic CHF ([facility] [address] DM2 (diabetes mellitus, type 2) ([facility] [address] Endocarditis of prosthetic aortic valve ([facility] Gout, and Lumbar stenosis. Past Surgical History:   has a past surgical history that includes Aortic valve repair and pacemaker placement. Restrictions  Restrictions/Precautions: Fall Risk, General Precautions  Other position/activity restrictions: PPM - limit shoulder extension to 90 deg LUE for one month after surgery, do not place smart phone directly over device, PICC line right upper arm  Equipment Used: Wheelchair, Other (recliner)    Subjective  Subjective: Pt seen bedside for an ADL. Restrictions/Precautions: Fall Risk;General Precautions             Objective               ADL  Grooming/Oral Hygiene  Assistance Level: Stand by assist  Skilled Clinical Factors: Pt stood at the sink to complete oral care. Upper Extremity Bathing  Assistance Level: Set-up  Skilled Clinical Factors: Covered PICC line. Completed bathing using appropriate soaps for PICC, incisions. Lower Extremity Bathing  Assistance Level: Stand by assist  Skilled Clinical Factors: Used dynahex on his legs. Washed ernesto/posterior areas standing with body wash. Used LH sponge to wash below his knees. Upper Extremity Dressing  Assistance Level: Set-up  Lower Extremity Dressing  Assistance Level: Stand by assist  Skilled Clinical Factors: Pt able to selwyn left leg of depends and then used reacher to selwyn right leg. Pt donned pants over feet without pants.   Pt stood to
Occupational Therapy  Facility/Department: [facility]  Rehabilitation Occupational Therapy Daily Treatment Note    Date: 23  Patient Name: Robe Guajardo       Room: P5V-7617/2362-59  MRN: [id number]  Account: [de-identified]   : 1940  (81 y.o.) Gender: male                    Past Medical History:  has a past medical history of Aortic stenosis, CAD (coronary artery disease), CHF (congestive heart failure) ([facility] [address] Complete heart block ([facility] [address] Depression, Diastolic CHF ([facility] [address] DM2 (diabetes mellitus, type 2) ([facility] [address] [facility] Gout, and Lumbar stenosis. Past Surgical History:   has a past surgical history that includes Aortic valve repair and pacemaker placement. Restrictions  Restrictions/Precautions: Fall Risk, General Precautions  Other position/activity restrictions: PPM - limit shoulder extension to 90 deg LUE for one month after surgery, do not place smart phone directly over device, PICC line right upper arm  Equipment Used: Wheelchair, Other (recliner)    Subjective  Subjective: pt met bedside, finishing with nursing,agreeable to OT, shower. Nurse Gloria Solo stated she will use chlorhexedine wipes for training with nurse  Restrictions/Precautions: Fall Risk;General Precautions             Objective     Cognition  Overall Cognitive Status: Exceptions  Memory: Decreased short term memory;Decreased recall of recent events  Problem Solving: Assistance required to implement solutions;Assistance required to generate solutions  Insights: Decreased awareness of deficits  Cognition Comment: pt more alert today, still cues for sternal precautions  Orientation  Overall Orientation Status: Within Functional Limits         ADL  Grooming/Oral Hygiene  Assistance Level: Modified independent  Skilled Clinical Factors: seated in wheelchair at sink for oral care  Upper Extremity Bathing  Assistance Level: Verbal cues; Set-up  Skilled Clinical Factors: covered PICC line , pt cued
Occupational Therapy  OCCUPATIONAL THERAPY  Progress Note   Second Session    Patient Name: Kobe Helen [address] Dr. Record Number: [id number]    Treatment Diagnosis: impaired functional mobility training following aortic valve replacement    General  Chart Reviewed: Yes, Orders, Progress Notes, History and Physical, Previous Admission  Family / Caregiver Present: No  Referring Practitioner: mercedez  Diagnosis: Endocarditis of prosthetic aortic valve     Restrictions/Precautions  Restrictions/Precautions: Fall Risk, General Precautions        Position Activity Restriction  Sternal Precautions: No Pushing, No Pulling, 5# Lifting Restrictions  Other position/activity restrictions: PPM - limit shoulder extension to 90 deg LUE for one month after surgery, do not place smart phone directly over device, PICC line right upper arm    Subjective: pt met bedside, wife present for education    Objective:  UE exLUE shoulder flex to 90 deg - stated he has history of rotator cuff injury. No weight used for LUE shouder flex/ext, horizontal abd/add, 2 lb right shoulder flex/ext, horizontal abd/add, bilat elbow flex/ext, sup/pron, 15 reps each to improve activity tolerance for ADL/mobility    Attempted transfer to mat table 28 inches high, he could not get back on it far enough, so lowered a couple inches as his bed sinks when he sits on it. Stood without difficulty from 26 inches high  AMb with wife assisting to bathroom, handicap height commode with CGA/SBA, required min/mod assist to stand from commode - wife required assist of OT for sit to stand of pt  Amb to bedroom, transferred to 22 inch bed with SBA, sit/supine with SBA, did use left elbow minimally  Amb to recliner, same assist - wife cued to keep her hand on gait belt. Pt stood with assist of wife from recliner, first time pt reaching for therapist.  Sat to recliner, wife then able to assist pt with cues to lean further forward.   Pt acknowledging that he does generally
PHYSICAL THERAPY  Progress Note   Second Session    Patient Name: Liliana Medina  Medical Record Number: [id number]    Treatment Diagnosis: impaired functional mobility training following aortic valve replacement      Chart Reviewed: Yes   Restrictions/Precautions: Fall Risk, General Precautions Other position/activity restrictions: PPM - limit shoulder extension to 90 deg LUE for one month after surgery, do not place smart phone directly over device   Additional Pertinent Hx: per Dr. Adriane Hannon H&P, \"80 yo M with pmh CHF, DM2, and severe AS s/p TAVR with Justin S3 valve (0/5843) complicated by endocarditis of tricuspid valve and enterococcus bacteremia (1/2023). He was treated with IV antibiotics x 6 weeks (thru 3/1/2023) but subsequently had recurrent enterococcus bacteremia. Additional work-up as done including TTE, ARMIN, and tagged WBC scan. 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 Therefore decision was made to admit to [facility] on 4/14/2023 and undergo aortic valve replacement and aortic root debridement (4/19 with Dr. Filippo Silva). He will continue on ampicillin and ceftriaxone x 6 weeks (thru 5/31). Post-operative course was complicated by complete heart block requiring pacemaker placement (4/25 with Dr. Rick Watkins). Also complicated by encephalopathy, dysphagia, and fluid overload. Now presents to ARU with impaired mobility, self-care, and cognition below his baseline. \"        Subjective: patient was seated in wheelchair upon arrival.  He reports his chest continues to have pain, but the left upper chest area is the worst part. Objective  Sit<>stand x4 with cues for technique of scooting forward and rocking to stand with nose over toes. First attempt was mod assist and patient stood too quickly causing posterior lean and fall back into the chair.   The last three he was able to follow good technique and was
PHYSICAL THERAPY  Progress Note   Second Session    Patient Name: Melvin Baez  Medical Record Number: [id number]    Treatment Diagnosis: impaired functional mobility training following aortic valve replacement      Chart Reviewed: Yes   Restrictions/Precautions: Fall Risk, General Precautions Other position/activity restrictions: PPM - limit shoulder extension to 90 deg LUE for one month after surgery, do not place smart phone directly over device, PICC line right upper arm   Additional Pertinent Hx: per Dr. Devin Johnson H&P, \"82 yo M with pmh CHF, DM2, and severe AS s/p TAVR with Justin S3 valve (1/3136) complicated by endocarditis of tricuspid valve and enterococcus bacteremia (1/2023). He was treated with IV antibiotics x 6 weeks (thru 3/1/2023) but subsequently had recurrent enterococcus bacteremia. Additional work-up as done including TTE, ARMIN, and tagged WBC scan. 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 Therefore decision was made to admit to [facility] on 4/14/2023 and undergo aortic valve replacement and aortic root debridement (4/19 with Dr. Payton Ervin). He will continue on ampicillin and ceftriaxone x 6 weeks (thru 5/31). Post-operative course was complicated by complete heart block requiring pacemaker placement (4/25 with Dr. Natalio Pruitt). Also complicated by encephalopathy, dysphagia, and fluid overload. Now presents to ARU with impaired mobility, self-care, and cognition below his baseline. \"        Subjective: Pt pleasant and agreeable to PT treatment. Reports 7/10 pain in chest (ongoing).     Objective:  Seated exercises: x10 alternating marches, x20 heel raises/toe raises ele, x10 LAQs eel, x10 glute sets, x10 hip ABD ele no resistance, x10 hip ABD ele with medium resistance band, x10 hamstring curls ele with medium resistance band    Pt attempted to stand without physical assist and attempted multiple
Patient admitted to rehab with CVA. A/Ox4. Transfers with walker. Mobility restrictions: sternal precautions. On regular 5 carb, low sodium/cholesterol, high fiber 2000 ml FR diet, tolerating well. Medications taken whole in thins. On Lovenox for DVT prophylaxis. Skin: has surgical incision to chest. Oxygen: none. LDA: PICC Right upper arm. Has been continent of bowel and continent of bladder. LBM 5/12. Chair/bed alarms in use and call light in reach. Will monitor for safety.
Patient admitted to rehab with Endocarditis of prosthetic aortic valve. A/Ox4. Transfers with walker. Mobility restrictions: WBAT. On Regular diet, tolerating well. Medications taken Whole. On eliquis, ASA for DVT prophylaxis. Skin: Redness in Groin area micotin power used. Incision cite dry, clean, and intact Left upper arm incision dry, clean and intact. Oxygen: room air. LDA: RFA PIV. Has been continent of bowel and continent of bladder. LBM 5/9. Chair/bed alarms in use and call light in reach. Will monitor for safety.  Electronically signed by Melly Constantino LPN on 6/3/5578 at 0:58 PM
Patient admitted to rehab with Endocarditis. A/Ox 4. Transfers with walker. Mobility restrictions: sternal precautions. On 5 carb low fat/cholesterol, high fiber 2000 ml diet, tolerating well. Medications taken whole. On Lovenox for DVT prophylaxis. Skin: has incisions to chest. Oxygen: none. LDA: PICC RUE. Has been continent of bowel and continent of bladder. LBM 5/13. Chair/bed alarms in use and call light in reach. Will monitor for safety.
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 A/Ox4. Transfers with assist One using gait belt and front wheeled walker. Mobility restrictions: sternal precautions and pacemaker precautions. On 5 carb, low fat, low cholesterol, high fiber, JACQUELIN diet, 2000 ML fluid restriction tolerating well. Medications taken whole with fluids. On Lovenox for DVT prophylaxis. Skin:  incision to mid chest well approximated and left upper chest, open to air. Oxygen: room air. LDA: PICC right arm, double lumen, flushes well. Receiving IV antibiotics as ordered. Has been continent of bowel and of bladder. LBM 5/8. Chair/bed alarms in use and call light in reach. Will monitor for safety.
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 A/Ox4. Transfers with no device, ambulates with rollator. Mobility restrictions: WBAT with sternal precautions. On regular 5 carb diet, low fat, low cholesterol, high fiber, on added salt diet, tolerating well. Medications taken whole. On Lovenox for DVT prophylaxis. Skin: surgical incision to sternum. Oxygen: room air. LDA: Right dual lumen PICC remains patent with brisk blood return. Has been continent of bowel and  bladder. LBM 5/9. Chair/bed alarms in use and call light in reach. Will monitor for safety.  Electronically signed by Bill Maldonado RN, [address] [place] [address] on 5/10/23 at 6:23 PM EDT
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 A/Ox4. Transfers with rollator x 1. Mobility restrictions: sternal and pacemaker precautions. On 5 carb, low fat, low cholesterol, high fiber, JACQUELIN diet, 2000 mL fluid restriction tolerating well. Medications taken whole with thin liquids. On Lovenox for DVT prophylaxis. Skin:  incision to mid and left upper chest, open to air. Oxygen: room air. LDA: PICC right arm, double lumen, flushes well. Has been continent of bowel and bladder. LBM 5/12. Chair/bed alarms in use and call light in reach. Will monitor for safety.
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 A/Ox4. Transfers with rollator x1. Mobility restrictions: WBAT, sternal precautions. On 5CC, low fat, low cholesterol, high fiber, JACQUELIN diet, tolerating well. 2000 mL Medications taken whole w/thins. On Lovenox for DVT prophylaxis. Skin: incisions to center and left chest.  Oxygen: RA. LDA: double lumen PICC rt arm. Has been continent of bladder this shift. LBM 5/14/23 (per pt). Specialty bed in use. IV antibiotics infused per orders this shift(see MAR). Pt requested prn pain meds x1 this shift (see MAR.) Pt resting quietly at this time. Cpap on. Respirations easy and even. Bed alarms on and call light in reach. Will monitor for safety.
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 A/Ox4. Transfers with rollator x1. Mobility restrictions: WBAT, sternal precautions. On 5CC, low fat, low cholesterol, high fiber, JACQUELIN diet, tolerating well. 2000 mL Medications taken whole w/thins. On Lovenox for DVT prophylaxis. Skin: incisions to center and left upper chest.  Oxygen: RA. LDA: double lumen PICC rt arm. Has been continent of bladder this shift. LBM 5/9/23. Specialty bed in use. IV antibiotics infused per orders this shift(see MAR). Pt requested prn pain meds x1 this shift (see MAR.) Pt resting quietly at this time. Cpap on. Respirations easy and even. Bed alarms on and call light in reach. Will monitor for safety.
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 A/Ox4. Transfers with stedy x2. Mobility restrictions: pacemaker, sternal and aspiration precautions. On regular; 5 carb; low fat; low cholesterol; high fiber; JACQUELIN; 2000 mL FR diet, tolerating well. Medications taken whole with thins. On lovenox for DVT prophylaxis. Skin: incisions to center and left chest; redness to right leg. Oxygen: RA. LDA: PICC right arm. Has been continent of bowel and continent of bladder. LBM 4/30/23. Chair/bed alarms in use and call light in reach. Will monitor for safety.  Electronically signed by Ericka Nicole RN on 5/1/2023 at 4:05 PM
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 A/Ox4. Transfers with two assist using gait belt and front wheeled walker. Mobility restrictions: sternal precautions and pacemaker precautions. On 5 carb, low fat, low cholesterol, high fiber, JACQUELIN diet, 2000 ML fluid restriction tolerating well. Medications taken whole with fluids. On Lovenox for DVT prophylaxis. Skin:  incision to mid chest well approximated and left upper chest, open to air. Oxygen: room air. LDA: PICC right arm, double lumen, flushes well. Receiving IV antibiotics as ordered. Has been continent of bowel and of bladder. LBM 5/4. Chair/bed alarms in use and call light in reach. Will monitor for safety.
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 A/Ox4. Transfers with two assist using gait belt and front wheeled walker. Mobility restrictions: sternal precautions and pacemaker precautions. On 5 carb, low fat, low cholesterol, high fiber, JACQUELIN diet, 2000cc fluid restriction tolerating well. Medications taken whole with fluids. On Lovenox for DVT prophylaxis. Skin:  incision to mid chest well approximated and left upper chest, open to air. Oxygen: room air. LDA: PICC right arm, double lumen, flushes well. Receiving IV antibiotics as ordered. Has been continent of bowel and of bladder. LBM 5/4. Chair/bed alarms in use and call light in reach. Will monitor for safety.
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 A/Ox4. Transfers with walker with 2 asst. Mobility restrictions: sternal precautions. On Regular, 5CC, low fat, low cholesterol, high fiber, JACQUELIN diet, tolerating well. Medications taken whole with thins. On Lovenox for DVT prophylaxis. Skin: incisions to center and left upper chest, CHRISTIAN. Oxygen: RA. LDA: PICC, rt arm, double lumen. Has been incontinent of bowel and  continent of bladder. LBM 4-30. Chair/bed alarms in use and call light in reach. Will monitor for safety.
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 A/Ox4. Transfers with walker x 2. Mobility restrictions: sternal and pacemaker precautions. On 5 carb, low fat, low cholesterol, high fiber, JACQUELIN diet, 2000 mL fluid restriction tolerating well. Medications taken whole with fluids. On Lovenox for DVT prophylaxis. Skin:  incision to mid and left upper chest, open to air. Oxygen: room air. LDA: PICC right arm, double lumen, flushes well. Has been continent of bowel and bladder. LBM 5/6. Chair/bed alarms in use and call light in reach. Will monitor for safety.
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 A/Ox4. Transfers with walker x2. Mobility restrictions: sternal and pacemaker precautions. On 5CC, low fat, low cholesterol, high fiber, JACQUELIN diet, tolerating well. Medications taken whole w/thins. On Lovenox for DVT prophylaxis. Skin: incisions to center and left upper chest. Oxygen: RA. LDA: PICC rt arm. Has been continent of bladder this shift. LBM 4/30/23. IV antibiotics infused per orders this shift(see MAR). Pt requested prn pain meds x1 this shift (see MAR.) Pt resting quietly at this time. Cpap on. Respirations easy and even. Bed alarms in use and call light in reach. Will monitor for safety.
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 A/Ox4. Transfers with walker x2. Mobility restrictions: sternal and pacemaker precautions. On 5CC, low fat, low cholesterol, high fiber, JACQUELIN diet, tolerating well. Medications taken whole w/thins. On Lovenox for DVT prophylaxis. Skin: incisions to center and left upper chest. Redness to RLE; Redness to ernesto area/inner thighs. Oxygen: RA. LDA: PICC rt arm. Has been continent of bladder this shift. LBM 5/3/23. Specialty bed in use. IV antibiotics infused per orders this shift(see MAR). Pt requested prn pain meds x1 this shift (see MAR.) Pt resting quietly at this time. Cpap on. Respirations easy and even. Bed alarms in use and call light in reach. Will monitor for safety.
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 A/Ox4. Transfers with walkerX2, tends to lean back when first stands. Mobility restrictions: sternal precautions/pacemaker precautions. On 5CC; low fat, low cholesterol, high fiber, JACQUELIN diet, 2000ml fluid restriction,tolerating well. Medications taken whole with thins. On Lovenox for DVT prophylaxis. Skin: incisions to center and upper left chest, CHRISTIAN. Oxygen: RA, BiPap @ HS. LDA: PICC right arm, double lumen. Has been continent of bowel and  of bladder. LBM 5-3. Chair/bed alarms in use and call light in reach. Will monitor for safety.
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 On regular 5 CC, low fat, low cholesterol, high fiber, JACQUELIN diet. Medications taken whole w/thins. On Lovenox for DVT prophylaxis, starting 5/1. Skin: incision to  center and left chest. Oxygen: RA. LDA: PICC, rt arm. Has been incontinent of bladder. LBM 4/30. Chair/bed alarms in use and call light in reach. Will monitor for safety.
Patient admitted to rehab with endocarditis of prosthetic valve. A/Ox4. Transfers with walker. Mobility restrictions: WBAT; pacemaker and sternal precautions. On regular; 5 carb; low fat; low cholesterol; high fiber; JACQUELIN; 2000 mL FR diet, tolerating well. Medications taken whole with thins. On lovenox for DVT prophylaxis. Skin: surgical incisions to chest. Oxygen: RA. LDA: PICC RUE. Has been continent of bowel and continent of bladder. LBM 5/14/23. Chair/bed alarms in use and call light in reach. Will monitor for safety.  Electronically signed by Nannette Davila RN on 5/17/2023 at 9:46 AM
Patient has been discharged per MD orders. Patient verbalizes understanding of all instructions, medications, and follow up. All belongings have been gathered and packed. Family at bedside to transport patient from hospital. Prescription medications delivered by retail pharmacy. Patient transferred into family vehicle without issues.  Electronically signed by Beckie Lin RN on 5/17/2023 at 11:18 AM
Patient is 81- year- old male admitted to rehab with s/p pacemaker placement and aortic valve replacement due to endocarditis. Denies pain/discomfort, states chest wall is sore. Sternal precautions in place  A/Ox4. Up sitting in chair. Respirations even unlabored denies shortness of breath or chest pain. Heart rate regular. Abdomen soft, non-tender, non-distended, obese with active bowel sounds x 4. Transfers with walker x 1. Mobility restrictions: sternal precautions. On CC, low fat, low cholesterol high fiber JACQUELIN 2000 fluid restriction diet, tolerating well. Medications taken whole with thins. On ASA, Lovenox for DVT prophylaxis. Skin: incision to center and left chest open to air. Oxygen: RA. LDA: picc line double to Rt arm flushing with blood return. Has been continent of bowel and continent of bladder. LBM 5/6/23. Chair/bed alarms in use and call light in reach. Will monitor for safety.
Patient is an 81 y.o. 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 Patient is A&O x 4. Transfers with front wheel walker. Mobility restrictions: WBAT, sternal precautions. On a regular diet, tolerating well. Medications taken whole with thin liquids. On Eliquis & ASA for DVT prophylaxis. Skin: Redness to groin area; Sternal incision site healing appropriately; Left upper arm incision. Oxygen: none, tolerating well. LDA: Double lumen PICC right brachial. Has been continent of bowel and bladder. LBM 5/9/23. Chair/bed alarms in use and call light in reach. Will monitor for safety.  Electronically signed by Hernan Portilol RN on 5/10/2023 at 2:29 AM
Patient is an 81 y.o. 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 Patient is A&O x 4. Transfers with front wheel walker. Mobility restrictions: WBAT, sternal precautions. On a regular diet, tolerating well. Medications taken whole with thin liquids. On Eliquis & ASA for DVT prophylaxis. Skin: Redness to groin area; Sternal incision site healing appropriately; Left upper arm incision. Oxygen: none, tolerating well. LDA: Double lumen PICC right brachial. Patient to discharge with PICC in place for abx therapy at home; education being provided to wife. Has been continent of bowel and bladder. LBM 5/13/23. Chair/bed alarms in use and call light in reach. Will monitor for safety.  Electronically signed by Sean Vigil RN on 5/14/2023 at 11:53 PM
Patient off the unit for pace maker follow up appointment.
Patient stated he was having chest pain. Incision was itching and burning and heart felt like it was coming out of his chest. Ricardo Ruiz he felt it was due to the fact he seen incision for the first time today. VSS. Denies SOB. Secure message to Dr. Virgilio Maya with update. Will continue to monitor.
Pharmacy Medication Reconciliation Note     List of medications patient is currently taking is complete. Source of information:   1. Discharge medication list from [facility] post aortic valve replacement    Notes regarding home medications:   1. Medication list updated  2. Of note, patient on courses of ampicillin 2g IV Q4H and ceftriaxone 2g IV Q12H for endocarditis.      Carlos Angelo PharmD  5/1/2023 3:45 AM
Physical Therapy  Facility/Department: [address] REHAB  Rehabilitation Physical Therapy Treatment Note  - AM and PM Sessions      NAME: Taylor Vazquez  : 1940 (81 y.o.)  MRN: [id number]  CODE STATUS: Full Code    Date of Service: 23       Restrictions:  Restrictions/Precautions: Fall Risk, General Precautions  Position Activity Restriction  Sternal Precautions: No Pushing, No Pulling, 5# Lifting Restrictions  Other position/activity restrictions: PPM - limit shoulder extension to 90 deg LUE for one month after surgery, do not place smart phone directly over device, PICC line right upper arm     Pertinent medical information:  Additional Pertinent Hx: per Dr. Miriam Santos H&P, \"82 yo M with pmh CHF, DM2, and severe AS s/p TAVR with Justin S3 valve (2933) complicated by endocarditis of tricuspid valve and enterococcus bacteremia (2023). He was treated with IV antibiotics x 6 weeks (thru 3/1/2023) but subsequently had recurrent enterococcus bacteremia. Additional work-up as done including TTE, ARMIN, and tagged WBC scan. 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 Therefore decision was made to admit to [facility] on 2023 and undergo aortic valve replacement and aortic root debridement ( with Dr. Lex Aguilar). He will continue on ampicillin and ceftriaxone x 6 weeks (thru ). Post-operative course was complicated by complete heart block requiring pacemaker placement ( with Dr. Estee Evans). Also complicated by encephalopathy, dysphagia, and fluid overload. Now presents to ARU with impaired mobility, self-care, and cognition below his baseline. \"    Social/Functional History  Lives With: Spouse (wife, Phill Gonzalez, in good health and retired)  Type of Home: [address]: One level  Home Access: Stairs to enter with [address] - Number of Steps: 2  Entrance Stairs - Rails: Right
Physical Therapy  Facility/Department: [address] REHAB  Rehabilitation Physical Therapy Treatment Note  - AM and PM Sessions      NAME: Concepción Mallory  : 1940 (81 y.o.)  MRN: [id number]  CODE STATUS: Full Code    Date of Service: 23       Restrictions:  Restrictions/Precautions: Fall Risk, General Precautions  Position Activity Restriction  Sternal Precautions: No Pushing, No Pulling, 5# Lifting Restrictions  Other position/activity restrictions: PPM - limit shoulder extension to 90 deg LUE for one month after surgery, do not place smart phone directly over device     Pertinent medical information:  Additional Pertinent Hx: per Dr. Case Velazco H&P, \"80 yo M with pmh CHF, DM2, and severe AS s/p TAVR with Justin S3 valve (3883) complicated by endocarditis of tricuspid valve and enterococcus bacteremia (2023). He was treated with IV antibiotics x 6 weeks (thru 3/1/2023) but subsequently had recurrent enterococcus bacteremia. Additional work-up as done including TTE, ARMIN, and tagged WBC scan. 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 Therefore decision was made to admit to [facility] on 2023 and undergo aortic valve replacement and aortic root debridement ( with Dr. Luan Schirmer). He will continue on ampicillin and ceftriaxone x 6 weeks (thru ). Post-operative course was complicated by complete heart block requiring pacemaker placement ( with Dr. Donna Rosario). Also complicated by encephalopathy, dysphagia, and fluid overload. Now presents to ARU with impaired mobility, self-care, and cognition below his baseline. \"    Social/Functional History  Lives With: Spouse (wife, Kathy Bonilla, in good health and retired)  Type of Home: [address]: One level  Home Access: Stairs to enter with [address] - Number of Steps: 2  Entrance Stairs - Rails: Right (ascending)  Bathroom Shower/Tub:
Physical Therapy  Facility/Department: [address] REHAB  Rehabilitation Physical Therapy Treatment Note    NAME: Franki Lira  : 1940 (81 y.o.)  MRN: [id number]  CODE STATUS: Full Code    Date of Service: 23       Restrictions:  Restrictions/Precautions: Fall Risk, General Precautions  Position Activity Restriction  Sternal Precautions: No Pushing, No Pulling, 5# Lifting Restrictions  Other position/activity restrictions: PPM - limit shoulder extension to 90 deg LUE for one month after surgery, do not place smart phone directly over device, PICC line right upper arm     Pertinent medical information:  Additional Pertinent Hx: per Dr. Candace Lion H&P, \"80 yo M with pmh CHF, DM2, and severe AS s/p TAVR with Justin S3 valve (9146) complicated by endocarditis of tricuspid valve and enterococcus bacteremia (2023). He was treated with IV antibiotics x 6 weeks (thru 3/1/2023) but subsequently had recurrent enterococcus bacteremia. Additional work-up as done including TTE, ARMIN, and tagged WBC scan. 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 Therefore decision was made to admit to [facility] on 2023 and undergo aortic valve replacement and aortic root debridement ( with Dr. Rita Price). He will continue on ampicillin and ceftriaxone x 6 weeks (thru ). Post-operative course was complicated by complete heart block requiring pacemaker placement ( with Dr. Khoi Hernandez). Also complicated by encephalopathy, dysphagia, and fluid overload. Now presents to ARU with impaired mobility, self-care, and cognition below his baseline. \"    Social/Functional History  Lives With: Spouse (wife, Nanda Mendoza, in good health and retired)  Type of Home: [address]: One level  Home Access: Stairs to enter with [address] - Number of Steps: 2  Entrance Stairs - Rails: Right (ascending)  Bathroom Shower/Tub:
Physical Therapy  Facility/Department: [address] REHAB  Rehabilitation Physical Therapy Treatment Note    NAME: Rhona Molina  : 1940 (81 y.o.)  MRN: [id number]  CODE STATUS: Full Code    Date of Service: 23       Restrictions:  Restrictions/Precautions: Fall Risk, General Precautions  Position Activity Restriction  Sternal Precautions: No Pushing, No Pulling, 5# Lifting Restrictions  Other position/activity restrictions: PPM - limit shoulder extension to 90 deg LUE for one month after surgery, do not place smart phone directly over device, PICC line right upper arm     Pertinent medical information:  Additional Pertinent Hx: per Dr. Jaqui Reyes H&P, \"82 yo M with pmh CHF, DM2, and severe AS s/p TAVR with Justin S3 valve (6578) complicated by endocarditis of tricuspid valve and enterococcus bacteremia (2023). He was treated with IV antibiotics x 6 weeks (thru 3/1/2023) but subsequently had recurrent enterococcus bacteremia. Additional work-up as done including TTE, ARMIN, and tagged WBC scan. 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 Therefore decision was made to admit to [facility] on 2023 and undergo aortic valve replacement and aortic root debridement ( with Dr. Nneka Murphy). He will continue on ampicillin and ceftriaxone x 6 weeks (thru ). Post-operative course was complicated by complete heart block requiring pacemaker placement ( with Dr. Brina Sims). Also complicated by encephalopathy, dysphagia, and fluid overload. Now presents to ARU with impaired mobility, self-care, and cognition below his baseline. \"    SUBJECTIVE  Subjective  Subjective: Patient arrived in wheelchair and reports he didn't get much rest this weekend. He does not like the new specialty mattress in the room because he can't move himself and it constantly repositions him through the night.   Pain: Pain at 6/10 in
Physical Therapy  Facility/Department: [address] REHAB  Rehabilitation Physical Therapy Initial Assessment    NAME: Ada Reynolds  : 1940 (86 y.o.)  MRN: [id number]  CODE STATUS: Full Code    Date of Service: 23      Past Medical History:   Diagnosis Date    Aortic stenosis     CAD (coronary artery disease)     Complete heart block (HCC)     Depression     Diastolic CHF (HCC)     DM2 (diabetes mellitus, type 2) (HCC)     Endocarditis of prosthetic aortic valve (HCC)     Gout     Lumbar stenosis      Past Surgical History:   Procedure Laterality Date    AORTIC VALVE REPAIR      TAVR 2022 then open AVR 2023 with Dr. Delroy Carlos      2023 with Dr. Rigo Lantigua       Chart Reviewed: Yes  Additional Pertinent Hx: per Dr. Bernard Del Valle H&P, \"82 yo M with pmh CHF, DM2, and severe AS s/p TAVR with Justin S3 valve (1658) complicated by endocarditis of tricuspid valve and enterococcus bacteremia (2023). He was treated with IV antibiotics x 6 weeks (thru 3/1/2023) but subsequently had recurrent enterococcus bacteremia. Additional work-up as done including TTE, ARMIN, and tagged WBC scan. 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 Therefore decision was made to admit to [facility] on 2023 and undergo aortic valve replacement and aortic root debridement ( with Dr. Gary Dale). He will continue on ampicillin and ceftriaxone x 6 weeks (thru ). Post-operative course was complicated by complete heart block requiring pacemaker placement ( with Dr. Rigo Lantigua). Also complicated by encephalopathy, dysphagia, and fluid overload. Now presents to ARU with impaired mobility, self-care, and cognition below his baseline. \"  Family / Caregiver Present: No  Referring Practitioner: Dr. Berta Werner  Referral Date : 23  Diagnosis: endocarditis of prosthetic aortic
Physical Therapy  Facility/Department: [address] REHAB  Rehabilitation Physical Therapy Treatment Note    NAME: Shlomo Resendiz  : 1940 (81 y.o.)  MRN: [id number]  CODE STATUS: Full Code    Date of Service: 5/15/23       Restrictions:  Restrictions/Precautions: Fall Risk, General Precautions  Position Activity Restriction  Sternal Precautions: No Pushing, No Pulling, 5# Lifting Restrictions (pt needs cues to recall)  Other position/activity restrictions: PPM - limit shoulder extension to 90 deg LUE for one month after surgery, do not place smart phone directly over device, PICC line right upper arm     SUBJECTIVE  Subjective  Pain: Pain at 7/10 in shoulders and neck but reports the chest pain due to incisions is improving. Post Treatment Pain Screening         OBJECTIVE  Orientation  Overall Orientation Status: Within Functional Limits    Functional Mobility  Transfers  Surface: Wheelchair  Additional Factors: Verbal cues; Increased time to complete  Device: Walker  Sit to Stand  Assistance Level: Minimal assistance  Skilled Clinical Factors: Pt read and followed instructions on rollator and stood with very min A on 3 attempts, once had difficulty and needed m od A  Stand to Sit  Assistance Level: Stand by assist  Skilled Clinical Factors: limited eccentric control back into chair but improved  Car Transfer  Assistance Level: Minimal assistance      Environmental Mobility  Ambulation  Surface: Level surface  Device: 4-Wheeled walker (heavy duty)  Distance: 150'  Assistance Level: Contact guard assist;Stand by assist  Gait Deviations: Slow billie;Decreased step length bilateral;Unsteady gait    PT Exercises  Exercise Treatment: B LE AP, ankle circles, TKE, hip flex x 10, forward flexion reaching far forward x 5, shoulder rolls and cervical rotation cx 10 in small range      ASSESSMENT/PROGRESS TOWARDS GOALS       Assessment  Assessment: Mr. Leno Pruett continues to be inconsistent with transfers.   He
Physical Therapy  Facility/Department: [address] REHAB  Rehabilitation Physical Therapy Treatment Note    NAME: Cari Elliott  : 1940 (81 y.o.)  MRN: [id number]  CODE STATUS: Full Code    Date of Service: 23       Restrictions:  Restrictions/Precautions: Fall Risk, General Precautions  Position Activity Restriction  Sternal Precautions: No Pushing, No Pulling, 5# Lifting Restrictions  Other position/activity restrictions: PPM - limit shoulder extension to 90 deg LUE for one month after surgery, do not place smart phone directly over device     SUBJECTIVE  Subjective  Subjective: Pt agreeable to PT treatment. Reports he slept poorly and reports fatigue. Pain: Reports 7/10 pain in chest.               OBJECTIVE       Functional Mobility  Balance  Standing Balance: Contact guard assistance  Transfers  Surface: Wheelchair  Additional Factors: Verbal cues; Increased time to complete  Device: Walker (heart pillow)  Sit to Stand  Assistance Level: Moderate assistance  Skilled Clinical Factors: cues for technique and sequencing and to scoot forward in chair, uses rocking and momentum  Stand to Sit  Assistance Level: Minimal assistance      Environmental Mobility  Ambulation  Surface: Level surface  Device: Rolling walker  Distance: 52' and then 48' with w/c follow for safety  Activity: Within Unit  Activity Comments: Sp02 90% and HR 79 after 2nd ambulation trial  Additional Factors: Verbal cues; Increased time to complete  Assistance Level: Contact guard assist;Minimal assistance  Gait Deviations: Slow billie;Decreased step length bilateral;Unsteady gait  Skilled Clinical Factors: cues to stay within walker base, limited by fatigue, mild SALMON, decreased foot clearance ele             PT Exercises  Exercise Treatment: seated exercises: x20 heel raises/toe raises ele, x10 alternating marches, 2x10 LAQs ele, x15 hamstring curls ele with medium resistance band, x15 hip ABD ele with medium resistance band, x15 hip
Physical Therapy  Facility/Department: [address] REHAB  Rehabilitation Physical Therapy Treatment Note    NAME: Kelton Boateng  : 1940 (81 y.o.)  MRN: [id number]  CODE STATUS: Full Code    Date of Service: 23       Restrictions:  Restrictions/Precautions: Fall Risk, General Precautions  Position Activity Restriction  Sternal Precautions: No Pushing, No Pulling, 5# Lifting Restrictions (pt needs cues to recall)  Other position/activity restrictions: PPM - limit shoulder extension to 90 deg LUE for one month after surgery, do not place smart phone directly over device, PICC line right upper arm     Pertinent medical information:  Additional Pertinent Hx: per Dr. Maria De Jesus Harvey H&P, \"80 yo M with pmh CHF, DM2, and severe AS s/p TAVR with Justin S3 valve () complicated by endocarditis of tricuspid valve and enterococcus bacteremia (2023). He was treated with IV antibiotics x 6 weeks (thru 3/1/2023) but subsequently had recurrent enterococcus bacteremia. Additional work-up as done including TTE, ARMIN, and tagged WBC scan. 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 Therefore decision was made to admit to [facility] on 2023 and undergo aortic valve replacement and aortic root debridement ( with Dr. Tommy Schilling). He will continue on ampicillin and ceftriaxone x 6 weeks (thru ). Post-operative course was complicated by complete heart block requiring pacemaker placement ( with Dr. Alli Fagan). Also complicated by encephalopathy, dysphagia, and fluid overload. Now presents to ARU with impaired mobility, self-care, and cognition below his baseline. \"    SUBJECTIVE  Subjective  Subjective: patient arrived in wheelchair and reports he feels he is ready to go home. He insists he can get up, just not the way we want him to do it.  PT and Dr. Otto Trevizo reinforced that the technique we are using is due to
Physical Therapy  Progress Note    Joselin Age  [id number]    PT evaluation initiated this AM, but due to patient's incontinence and limited endurance, eval was not completed. Will complete this PM with full write up to follow.      Therapy Time     Individual Co-treatment   Time In 0815     Time Out 0900     Minutes [address] [place], [place] [id number]
Pt evening shift assessment complete. VSS and medication given as ordered. Pt assessed for comfort needs, given pain medication per pt request.  Pt does not express any additional needs at this time, resting comfortably in bed.
Resting quietly in bed. Patient admitted with debility s/p endocarditis requiring aortic valve replacement and pacemaker placement. Pt is Ox4. Ambulating with a walker of 1-2. Doing well to maintain sternal precautions. Lungs clear but diminished. Congested cough at times. Splinting pillow/bear in place. HR regular. Abdomen non tender with active bowel sounds. Has been continent of urine. C/o surgical site pain and medicated per routine orders. Encouraged to call for all needs. Call light remains in reach at all times.  Sabina Reynaga RN
SLP ALL NOTES  Facility/Department: [address] IP REHAB  Initial Speech/Language/Cognitive Assessment    NAME: Cari Elliott  : 1940   MRN: [id number]  ADMISSION DATE: 2023  ADMITTING DIAGNOSIS: has Endocarditis of prosthetic aortic valve ([address] on their problem list.  DATE ONSET: 2023    Date of Eval: 2023   Evaluating Therapist: Michelle Ervin SLP      Chart Review  MD History and Physical documentation revealed:  History of Present Illness/Hospital Course:  Patient is an 80 yo M with pmh CHF, DM2, and severe AS s/p TAVR with Justin S3 valve (9064) complicated by endocarditis of tricuspid valve and enterococcus bacteremia (2023). He was treated with IV antibiotics x 6 weeks (thru 3/1/2023) but subsequently had recurrent enterococcus bacteremia. Additional work-up as done including TTE, ARMIN, and tagged WBC scan. 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 Therefore decision was made to admit to [facility] on 2023 and undergo aortic valve replacement and aortic root debridement ( with Dr. Tory Stringer). He will continue on ampicillin and ceftriaxone x 6 weeks (thru ). Post-operative course was complicated by complete heart block requiring pacemaker placement ( with Dr. Ute Arrington). Also complicated by encephalopathy, dysphagia, and fluid overload. Now presents to ARU with impaired mobility, self-care, and cognition below his baseline. Currently, patient reports moderate chest wall pain. Worse with movement and cough. Improves with rest and medication. He has mild shortness of breath with activity and intermittent cough. He denies lightheadedness/dizziness, chest pressure, palpitations. He feels generally weak. He is motivated to start inpatient rehabilitation program.     RECENT RESULTS  CT OF HEAD: 2023  Impression  1. Motion limited study.   No definite CT evidence of acute
SLP ALL NOTES  Facility/Department: [address] REHAB   CLINICAL BEDSIDE SWALLOW EVALUATION    NAME: Demetri Pagan  : 1940  MRN: [id number]    ADMISSION DATE: 2023  ADMITTING DIAGNOSIS: has Endocarditis of prosthetic aortic valve ([address] on their problem list.  DATE ONSET: 2023       Date of Eval: 2023  Evaluating Therapist: CARLOS Hebert      Chart Review  MD History and Physical documentation revealed:  History of Present Illness/Hospital Course:  Patient is an 80 yo M with pmh CHF, DM2, and severe AS s/p TAVR with Justin S3 valve (6691) complicated by endocarditis of tricuspid valve and enterococcus bacteremia (2023). He was treated with IV antibiotics x 6 weeks (thru 3/1/2023) but subsequently had recurrent enterococcus bacteremia. Additional work-up as done including TTE, ARMIN, and tagged WBC scan. 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 Therefore decision was made to admit to [facility] on 2023 and undergo aortic valve replacement and aortic root debridement ( with Dr. Ally Lucas). He will continue on ampicillin and ceftriaxone x 6 weeks (thru ). Post-operative course was complicated by complete heart block requiring pacemaker placement ( with Dr. Odin Quintanilla). Also complicated by encephalopathy, dysphagia, and fluid overload. Now presents to ARU with impaired mobility, self-care, and cognition below his baseline. Currently, patient reports moderate chest wall pain. Worse with movement and cough. Improves with rest and medication. He has mild shortness of breath with activity and intermittent cough. He denies lightheadedness/dizziness, chest pressure, palpitations. He feels generally weak. He is motivated to start inpatient rehabilitation program.      RECENT RESULTS  CT OF HEAD: 2023  Impression  1. Motion limited study.   No definite CT evidence of acute
Second call made to [address] [place] [address] regarding need for bariatric bed.
Speech Therapy note regarding SLP therapy session    SLP attempted to see 5/9/2023 at schedule time of 1030 am; but 5/9/2023 pt had left for his MD scheduled appointment and prior to SLP scheduled therapy time. Potential variance. Plan: will re-attempt later today 5/9/2023 pending therapy schedule and return from appointment. Signed   Jessica Mathews,MS,CCC,SLP [id number]  Speech and Language Pathologist  5/9/2023 late entry at 200 pm for 5/9/2023 1030 am
This is a 81 y.o.  male admitted on 4/30/2023 with Endocarditis of prosthetic aortic valve. Pt A&O X4. Reported  pain 5/10, pain medication given per MAR. Denies any chest pain or shortness of breath. Oxygen; room air. Abd soft and nontender. Active bowel sounds. Last BM 5/12/23. Continent of bowel & bladder. Depends on. Denies any abd discomfort. Skin surgical incision to sternum. LDA: Right double  lumen PICC. Transfers with rollator X1. Mobility restrictions: WBAT with sternal precautions. On regular 5 carb diet, low fat, low cholesterol, high fiber, no added salt diet 2000ml FR  tolerating well. Medications taken whole. Tolerated well. On Lovenox for DVT prophylaxis. Call light in reach. Patient instructed to call if there is any needs or changes.   Electronically signed by Hilary Castro RN on 5/12/2023 at 12:55 PM
This is a 81 y.o.  male admitted on 4/30/2023 with Endocarditis of prosthetic aortic valve. Pt A&O X4. Reported  pain 7/10, pain medication given per MAR. Denies any chest pain or shortness of breath. Oxygen; room air. Abd soft and nontender. Active bowel sounds. Last BM 5/9/23. Continent of bowel & bladder. Depends on. Denies any abd discomfort. Skin surgical incision to sternum. LDA: Right double  lumen PICC. Transfers with rollator X1. Mobility restrictions: WBAT with sternal precautions. On regular 5 carb diet, low fat, low cholesterol, high fiber, no added salt diet, tolerating well. Medications taken whole. Tolerated well. On Lovenox for DVT prophylaxis. Call light in reach. Patient instructed to call if there is any needs or changes.   Electronically signed by Agnieszka Garcia RN on 5/11/2023 at 12:22 PM
Assistance  Additional Factors: Verbal cues, Increased time to complete, Head of bed flat, Without handrails (flat therapy mat with 3 pillows)  Sit>supine:  Assistance Level: Supervision  Skilled Clinical Factors: increased time but able to complete without assistance  Supine>sit:  Assistance Level: Minimal assistance  Skilled Clinical Factors: Patient requires verbal cues. He used left UE minimally. needed min assist to lift trunk from the bed. Transfers:  Surface: Wheelchair  Additional Factors: Verbal cues, Increased time to complete  Device: Walker (holding cardiac bear)  Sit>stand:  Assistance Level: Moderate assistance  Skilled Clinical Factors: began as mod assist with reduced weight shift forward despite cueing. Stand>sit:  Assistance Level: Contact guard assist, Minimal assistance  Skilled Clinical Factors: poor eccentric control back into chair. Verbal cues to bow  Bed<>chair  Technique: Stand step  Assistance Level: Minimal assistance  Skilled Clinical Factors: with wheeled walker  Stand Pivot:     Lateral transfer:     Car transfer:     Ambulation:  Surface: Level surface  Device: Rolling walker (HD)  Distance: short distance in room and in therapy gym, 135' with two turns  Activity: Within Unit  Activity Comments: mild SALMON but recovers quickly with rest break, decreased bilateral step length and height but is improving slowly, slight posterior lean at times. Patient reporting pain in left LE during ambulation from knee down.   Causing antalgic pattern  Additional Factors: Verbal cues, Increased time to complete  Assistance Level: Contact guard assist  Gait Deviations: Slow billie, Decreased step length bilateral, Unsteady gait  Skilled Clinical Factors: cues to stay within walker base, limited by fatigue, mild SALMON, decreased foot clearance ele, slight posterior lean noted after descending from curb  Stairs:  Stair Height: 6''  Device: Bilateral handrails  Number of Stairs: 4  Additional Factors:
On/Taking Off Footwear  Equipment Provided: Sock aid;Reachers  Assistance Level: Set-up  Skilled Clinical Factors: Donned socks using sock aid. Pt donned shoes using reacher. Tub/Shower Transfers  Type: Shower  Transfer From: Rolling walker  Transfer To:  (Bedside commode placed in the shower.)  Additional Factors: Verbal cues  Assistance Level: Contact guard assist;Stand by assist  Skilled Clinical Factors: Required cues for hand placement as he was attempting to use the bars to stand. Bed To/From Chair  Assistance Level: Contact guard assist  Skilled Clinical Factors: Cues as he was attempting to stand initially using his arms. Pt was able to rock and stand from all surfaces this date without using assist to stand. PM Session- Pt seen initially in the room and taken to the rehab gym. Pt stood from the recliner in his room with min assist.  Pt attempted to stand three different times and cued to rock and lean forward. After third attempt of not being able to stand on his own pt was given min assist to stand. Pt was transported to the gym. Pt stood from the wheelchair with min/CGA. He completed mobility using 4 WW into the apartment and transfer to the bed with CGA. He completed sit-supine with SBA. When going supine-sit pt used his left arm to push requiring cues for sternal precautions. He required min assist for supine-sit. Pt stood from the bed with CGA. He completed three sit-stands from the wheelchair. He was not leaning far enough forward and then when leaning further forward with his head past his feet he was able to complete a stand with CGA. Assessment  Assessment  Assessment: Pt tolerated session well. Pt required cues for hand placement this date as he was initially attempting to use his hands to push and stand.   Pt completed transfers this date with CGA as he stood from all surfaces without physical assist.  He completed bathing tasks with cues for using proper
Respiratory Therapies   C1. Oxygen Therapy []           C2. Continuous []           C3. Intermittent []           C4. High-concentration []   D1. Suctioning []           D2. Scheduled []           D3. As needed []   E1. Tracheostomy Care []   F1. Invasive Mechanical Ventilator (ventilator or respirator) []   G1. Non-invasive Mechanical Ventilator []           G2. BiPAP []           G3. CPAP []   Other   H1. IV Medications []           H2. Vasoactive medications []           H3. Antibiotics [x]           H4. Anticoagulation []           H10. Other []   I1. Transfusions []   J1. Dialysis []           J2. Hemodialysis []           J3. Peritoneal dialysis []   O1. IV access []           O2. Peripheral []           O3. Midline []           O4.  Central (PICC, tunneled, port) [x]      None of the above (select if no Cancer, Respiratory, or Other boxes are checked) []
incisions is improving. reviewed pain medication - but did receive pain medications prior to therapy    Social/Functional History  Lives With: Spouse (wife, Radha Betancourt, in good health and retired)  Type of Home: [address]: One level  Home Access: Stairs to enter with [address] - Number of Steps: 2  Entrance Stairs - Rails: Right (ascending)  Bathroom Shower/Tub: Walk-in shower, Shower chair with back, Doors  Bathroom Toilet: Handicap height  Bathroom Equipment: Shower chair, Grab bars around toilet, Grab bars in shower  Bathroom Accessibility: Walker accessible  Home Equipment: Everet Escort, 4 wheeled, Cane  Has the patient had two or more falls in the past year or any fall with injury in the past year?: No  Receives Help From: Family  ADL Assistance: Independent  Homemaking Assistance:  (wife does all homemaking, but patient assists with finances. Wife organizes patient's medications)  Ambulation Assistance: Independent (cane or walker depending on area of the house and how he was feeling)  Transfer Assistance: Independent  Active : No  Patient's  Info: patient reports he can drive, but has not driven in a year. 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 He rarely goes out        OBJECTIVE       Functional Mobility  Balance  Sitting Balance: Supervision  Standing Balance: Contact guard assistance (with use of walker)    Transfers  Surface: Wheelchair  Additional Factors: Verbal cues; Increased time to complete  Device: Walker  Sit to Stand  Assistance Level: Moderate assistance;Minimal assistance  Skilled Clinical Factors: patient had great difficulty getting up the first time and required mod assist. He reports he is not awake yet.   Reviewed technique and reaching
sequencing and cues to provide during sit to stand transfers. Pt completed sit to stand with PT with Paola with max cues and then ambulated approx 100' with heavy duty 4WW and CGA. Stand to sit CGA. Wife requesting that pt attempt standing from regular chair rather than w/c since pt will not have w/c at home. Sit to stand with modA from PT from regular chair and then w/c cushion placed in regular chair to build up low surface. Then PT had wife attempt to practice transfer with pt and they attempted multiple times and pt had difficulty following wife's cues and wife was not able to provide enough assist at the gait belt to bring pt to standing. Then PT provided min-modA for sit to stand and pt ambulated short distance to/from steps with 4WW and CGA. Ascended/descended 4 steps with Paola from PT. Attempted to have pt use R rail only to simulate home set up and pt pulling up heavily with BUEs on rail even with cues to use rails lightly. Pt then instructed to use 2 rails due to safety concerns but continued to pull on rails. Paola descending 4 steps with ele rails and decreased eccentric control. PT inquired if a 2nd rail could be installed for entry into the home and wife replied that pt \"didn't have any problem doing steps\" after d/c from [facility]. Wife educated that pt is now needing more physical assist and has additional precautions. Seated exercises completed at end of session due to fatigue from standing activities. Seated exercises: x20 heel raises/toe raises, x10 LAQs ele, x10 alternating marches, x10 hip ABD ele; cues to stay on task  Care transitioned to OT at end of session. Assessment: Pt limited by fatigue and cognition this session. Pt frustrated with frequent cuing from PT and wife during sit to stand transfers and encouraged to continue transfer training to be able to progress to d/c home. Pt states \"I don't care if I go home. \"  His wife was unable to assist him to come to standing
bilateral feet. He will benefit from continued skilled inpatient PT on ARU to improve safety and independence with functional mobility and to address activity tolerance prior to returning home. Activity Tolerance: Patient limited by endurance; Patient tolerated treatment well  Discharge Recommendations: Patient would benefit from continued therapy after discharge;Continue to assess pending progress  PT Equipment Recommendations  Other: will continue to assess. Patient has a wheeled walker and a cane      PM Session  Patient just finishing with OT, Pamela Taylor at start of PT session. Pamela Taylor reports the patient has been shaky and weak this PM, but he reports he feels okay. Vitals were assessed by OT and noted to be stable. Patient reporting pain stable at start of session, but reported that it increased to an 8/10. Patient requested pain medication and RNCatrachito brought to room. Sit>stand with mod assist from regular height wheelchair. Ambulated 150' with wheeled walker and wheelchair follow for safety as patient is feeling weak. He was becoming shaky for the last 40', but patient reporting he really wanted to make it back to his room. Stand>sit with min assist for control. Patient became teary eyed secondary to pain increasing and feeling very fatigued. RNVirginia, brought in medication. Patient with some hiccups and sharp pains that he moans out with. He reports, \"It comes and goes\" but he cannot describe more in depth what he is feeling. Patient is very fatigued but wants to complete his session  Patient completed 15 reps bilateral LE ther ex while seated in recliner: APs, LAQ (right LE had to be broken into two sets secondary to fatigue), hip add sets, GS, hip abduction without resistance, and marching. Multiple rest breaks required secondary to fatigue. PT assisted with elevating legs and positioning patient in comfort in recliner.   He was limited this PM secondary to fatigue but was very determined to
ordered. Review of FEES 4/21/2023 and 4/26/2023. 5/5/2023: Pt more alert today and improved active participation and less external prompting warranted. Plan: Continue as per plan of care. Continued Tx Upon Discharge: ?    [] Yes [] No [x] TBD based on progress while on ARU [] Vital Stim indicated [] Other:   Estimated discharge date: TBD   Discharge recommendations:   [] Home independently  [x] Home with assistance []  24 hour supervision  [] ECF [] Other:     Additional information:     Interventions used during Rehab Stay:  [] Speech/Language Treatment  [] Instruction in HEP [] Group [x] Dysphagia Treatment [x] Cognitive Treatment   [] Other:          Electronically Signed by    Faviola Suazo. Fluajcinta,MS,CCC,SLP 0692  Speech and Language Pathologist
within walker base, limited by fatigue, mild SALMON, decreased foot clearance ele, slight posterior lean noted after descending from curb  Stairs:  Stair Height: 6''  Device: Bilateral handrails  Number of Stairs: 4  Additional Factors: Non-reciprocal going up, Non-reciprocal going down  Assistance Level: Minimal assistance  Skilled Clinical Factors: Patient was able to complete with non-reciprocal pattern but needed cueing to only use bilateral UE for steadying on the rails - seemed to be pulling on rails more that he probably should, required CGA/min A. Patient with slight posterior lean and required cueing to correct. Curb:  Curb Height: 6''  Device: Rolling walker  Number of Curbs: 1  Additional Factors: Verbal cues, Set-up  Assistance Level: Minimal assistance, Contact guard assist  Skilled Clinical Factors: Required moderate cueing for technique and sequencing, but was able to manage the walker up and down the step. He has slight posterior lean that requires intermittent min assist to correct bhavesh after descend when complaining of increased back pain and lightheadedness noting slight posterior lean so when to SHARE [facility] brought closer to curb step. Wheelchair:     Assessment:  Assessment: Mr. Ismael Simental continues with improved energy level and not drifting off to sleep. He varies with abilities to stand from CGA to min A, bhavesh when he has instability with posterior lean upon standing. He has limited carry over of new learning and needs cues for technique and safety. He was able to [id number] with wheeled walker with generally CGA. Patient continues with intermittent posterior lean which may be partly due to neuropathy in bilateral LE causing numbness in bilateral feet. He will benefit from continued skilled inpatient PT on ARU to improve safety and independence with functional mobility and to address activity tolerance prior to returning home.   Activity Tolerance: Patient limited by endurance, Patient
[facility], OTR/L 770
weakness, decreased balance, endurance, cognition, dysphagia     Prosthetic aortic valve endocarditis, enterococcus bacteremia  -s/p sternotomy, explant and AVR, debridement of aortic root (4/19 with Dr. Sharan Sanchez). -Wound care per CT Surgery  -Sternal precautions  -IV antibiotics per ID: ampicillin + ceftriaxone (thru 5/31)  ----Weekly labs to [facility] ID (Dr. Jim García)  -PT/OT     Complete heart block  -s/p PPM (4/25 with Dr. Elida Cheng)  -LUE precautions     Metabolic Encephalopathy   -Regulate sleep/wake. Emphasis on routine.   -SLP. Dysphagia   -Dietitian and SLP consults.   -Currently on regular + thins     Acute blood loss anemia   -Post-surgical.   -Monitor Hgb, transfuse prn <7. Chronic dCHF  -torsemide, bb  -Daily wt     CAD  -ASA, statin, bb     DM2 with neuropathy  -Hypoglycemic last night 5/1   -Change lantus 14 units qhs, prandial 5 TID, ISS     Depression  -fluoxetine     Gout  -allopurinol     Acute on chronic pain syndrome, spinal stenosis  -scheduled acetaminophen, lidoderm, prn oxycodone     Morbid Obesity   -Complicating assessment and treatment. Placing patient at risk for multiple co-morbidities as well as early death and contributing to the patient's presentation.   -Dietician consult. Counseling and education. Bladder   -High risk retention   -Monitor PVRs, SC prn >300cc     Bowel   -High risk constipation   -senna+colace BID, PRN miralax, MoM, and bisacodyl supp. Safety   -fall and aspiration precautions     PPx  -DVT: lovenox  -GI: pantoprazole    Rehab Progress: Making progress. Working on functional mobility, strength, balance, endurance, cognition. Anticipated Dispo: home with spouse  Services: TBD  DME: TBD  ELOS: 2 weeks      [address]  Marcella Childers MD 5/2/2023, 1:39 PM
5 Days  Therapy Duration: 2 Weeks  Current Treatment Recommendations: Strengthening;Balance training;Functional mobility training;Transfer training;Stair training;Gait training; Endurance training;Patient/Caregiver education & training; Safety education & training  Safety Devices  Type of Devices: All fall risk precautions in place;Gait belt;Left in chair (in wheelchair to return to room with transportation)    EDUCATION  Education  Education Given To: Patient  Education Provided: Role of Therapy;Plan of Care;Safety; Mobility Training;Transfer Training;Precautions  Education Provided Comments: heart pillow for precautions with transfers, safety on stairs including sternal precautions  Education Method: Demonstration;Verbal  Barriers to Learning: Hearing;Cognition  Education Outcome: Verbalized understanding;Demonstrated understanding;Continued education needed        Therapy Time   Individual Concurrent Group Co-treatment   Time In [address] [place] [address]         Time Out 1115         Minutes 40              Second Session Therapy Time     Individual Co-treatment   Time In [address] [place] [address]     Time Out 1550     Minutes One Garrison Parmar, [id number], 05/03/23 at 11:15 AM
Training;Precautions  Education Provided Comments: reinforcing importance of anterior weight shift to allow for improved ease of transition to stand.  PT wrote out new instructions for anterior weigth shift with transition to stand  Education Method: Demonstration;Verbal  Barriers to Learning: Hearing;Cognition  Education Outcome: Verbalized understanding;Demonstrated understanding;Continued education needed        Therapy Time   Individual Concurrent Group Co-treatment   Time In 0815         Time Out 0900         Minutes 39              Second Session Therapy Time     Individual Co-treatment   Time In 1430     Time Out 1515     Minutes [address] Pablo, [id number], 05/10/23 at 9:02 AM
(4/26). -Per pharmacy none of his medications are associated with this  -UA/culture negative     Bladder   -High risk retention   -Monitor PVRs, SC prn >300cc     Bowel   -High risk constipation   -senna+colace BID, PRN miralax, MoM, and bisacodyl supp. Safety   -fall and aspiration precautions     PPx  -DVT: lovenox  -GI: pantoprazole    Rehab Progress:  Making progress. Working on functional mobility, strength, balance, endurance, cognition. Barriers include: sternal precautions, baseline neuropathy, cognitive deficits. Anticipated Dispo: home with spouse  Services:  PT, OT, RN  DME: Has all  ELOS: 5/17      Peggy Mancilal.  Kamaljit Wright MD 5/12/2023, 3:13 PM
concrete receptive and expressive language skills. Breakdown with complex language skills. Unclear baseline  Dysphagia Diagnosis: Mild oral stage dysphagia, Mild pharyngeal stage dysphagia (vharacterized by varaible rate/disorganized bolus formtion/cohesion and A-P oral transit; delayed swallow)    PT  Position Activity Restriction  Sternal Precautions: No Pushing, No Pulling, 5# Lifting Restrictions  Other position/activity restrictions: PPM - limit shoulder extension to 90 deg LUE for one month after surgery, do not place smart phone directly over device  Objective     Sit to Stand: Minimal Assistance, Moderate Assistance, Maximum Assistance (min in AM from bed, but max assist from recliner chair with poor anterior transition. dizziness with upright and initial posterior lean.)  Stand to Sit: Minimal Assistance, Maximum Assistance (min assist to bed, mod assist to wheelchair)  Bed to Chair: Minimal assistance (min assist without a device from the bed, but mod-max assist from recliner)  Device: [address]: Minimal assistance  Distance: 8', 28' with no turns  OT  PT Equipment Recommendations  Other: will continue to assess. Patient has a wheeled walker and a cane     Assessment        SLP          Body mass index is 41.22 kg/m². Rehabilitation Diagnosis:   17.1 - Infections     Assessment and Plan:     Impairments: generalized weakness, decreased balance, endurance, cognition, dysphagia     Prosthetic aortic valve endocarditis, enterococcus bacteremia  -s/p sternotomy, explant and AVR, debridement of aortic root (4/19 with Dr. Rick Brown). -Wound care per CT Surgery  -Sternal precautions  -IV antibiotics per ID: ampicillin + ceftriaxone (thru 5/31)  ----Weekly labs to [facility] (Dr. Shiv Abbott)  -PT/OT     Complete heart block  -s/p PPM (4/25 with Dr. Joanne Bowen)  -LUE precautions     Metabolic Encephalopathy   -Regulate sleep/wake. Emphasis on routine.   -SLP. Dysphagia   -Dietitian and SLP consults.
this  -UA/culture negative     Bladder   -High risk retention   -Monitor PVRs, SC prn >300cc     Bowel   -High risk constipation   -senna+colace BID, PRN miralax, MoM, and bisacodyl supp. Safety   -fall and aspiration precautions     PPx  -DVT: lovenox  -GI: pantoprazole    Rehab Progress: Making progress. Working on functional mobility, strength, balance, endurance, cognition. Barriers include: sternal precautions, baseline neuropathy, cogniitve deficits. Anticipated Dispo: home with spouse  Services: HH PT, OT, RN  DME: RACIEL  ELOS: 5/17      Viktoria Riedel.  Phill Rodriguez MD 5/9/2023, 5:32 PM
300

## 2023-05-17 NOTE — CARE COORDINATION
[facility]    DC order noted, all docs needed have been faxed to [facility] for home care services.     Home care to see patient by 5/18/23    Shadi Razo RN, BSN CTN  [facility] [phone]

## 2023-05-17 NOTE — CARE COORDINATION
Spoke with bedside RN , Angela Mckeon, regarding DC for today. [employer] REp, Andrew Harris is coming in at 10 am to review home infusion with wife. Cherelle for [employer]. [facility] on board for home care needs.   [place], [place]     Case Management   [phone]    5/17/2023  9:23 AM

## 2023-05-17 NOTE — FLOWSHEET NOTE
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 A/Ox4. Transfers with rollator x1. Mobility restrictions: WBAT, sternal precautions. On 5CC, low fat, low cholesterol, high fiber, JACQUELIN diet, tolerating well. 2000 mL Medications taken whole w/thins. On Lovenox for DVT prophylaxis. Skin: incisions to center and left chest.  Oxygen: RA. LDA: double lumen PICC rt arm. Has been continent of bladder this shift. LBM 5/14/23. Specialty bed in use. IV antibiotics infused per orders this shift(see MAR). Pt requested prn pain meds x1 this shift (see MAR.) Pt resting quietly at this time. Cpap on. Respirations easy and even.  Bed alarms on and call light in reach